# Patient Record
Sex: MALE | Race: WHITE | NOT HISPANIC OR LATINO | Employment: OTHER | URBAN - METROPOLITAN AREA
[De-identification: names, ages, dates, MRNs, and addresses within clinical notes are randomized per-mention and may not be internally consistent; named-entity substitution may affect disease eponyms.]

---

## 2023-04-06 DIAGNOSIS — I10 BENIGN ESSENTIAL HYPERTENSION: Primary | ICD-10-CM

## 2023-04-10 PROBLEM — I10 BENIGN ESSENTIAL HYPERTENSION: Status: ACTIVE | Noted: 2023-04-10

## 2023-04-10 RX ORDER — VERAPAMIL HYDROCHLORIDE 180 MG/1
TABLET, FILM COATED, EXTENDED RELEASE ORAL
Qty: 90 TABLET | Refills: 3 | Status: SHIPPED | OUTPATIENT
Start: 2023-04-10 | End: 2024-05-20 | Stop reason: SDUPTHER

## 2023-05-17 ENCOUNTER — LAB (OUTPATIENT)
Dept: LAB | Facility: LAB | Age: 78
End: 2023-05-17
Payer: MEDICARE

## 2023-05-17 ENCOUNTER — OFFICE VISIT (OUTPATIENT)
Dept: PRIMARY CARE | Facility: CLINIC | Age: 78
End: 2023-05-17
Payer: MEDICARE

## 2023-05-17 VITALS
WEIGHT: 157 LBS | BODY MASS INDEX: 26.16 KG/M2 | TEMPERATURE: 98.2 F | HEIGHT: 65 IN | SYSTOLIC BLOOD PRESSURE: 160 MMHG | DIASTOLIC BLOOD PRESSURE: 78 MMHG | HEART RATE: 92 BPM

## 2023-05-17 DIAGNOSIS — I35.8 AORTIC VALVE SCLEROSIS: ICD-10-CM

## 2023-05-17 DIAGNOSIS — I10 BENIGN ESSENTIAL HYPERTENSION: ICD-10-CM

## 2023-05-17 DIAGNOSIS — E78.00 HYPERCHOLESTEROLEMIA: ICD-10-CM

## 2023-05-17 DIAGNOSIS — G62.89 OTHER POLYNEUROPATHY: ICD-10-CM

## 2023-05-17 DIAGNOSIS — Z00.00 ROUTINE GENERAL MEDICAL EXAMINATION AT HEALTH CARE FACILITY: Primary | ICD-10-CM

## 2023-05-17 PROBLEM — G62.9 PERIPHERAL NEUROPATHY: Status: ACTIVE | Noted: 2023-05-17

## 2023-05-17 PROBLEM — B97.89 VIRAL KERATITIS: Status: ACTIVE | Noted: 2023-05-17

## 2023-05-17 PROBLEM — H16.8 VIRAL KERATITIS: Status: ACTIVE | Noted: 2023-05-17

## 2023-05-17 PROBLEM — Z96.642 STATUS POST LEFT HIP REPLACEMENT: Status: ACTIVE | Noted: 2023-05-17

## 2023-05-17 LAB
BASOPHILS (10*3/UL) IN BLOOD BY AUTOMATED COUNT: 0.09 X10E9/L (ref 0–0.1)
BASOPHILS/100 LEUKOCYTES IN BLOOD BY AUTOMATED COUNT: 1.3 % (ref 0–2)
EOSINOPHILS (10*3/UL) IN BLOOD BY AUTOMATED COUNT: 0.07 X10E9/L (ref 0–0.4)
EOSINOPHILS/100 LEUKOCYTES IN BLOOD BY AUTOMATED COUNT: 1 % (ref 0–6)
ERYTHROCYTE DISTRIBUTION WIDTH (RATIO) BY AUTOMATED COUNT: 12.6 % (ref 11.5–14.5)
ERYTHROCYTE MEAN CORPUSCULAR HEMOGLOBIN CONCENTRATION (G/DL) BY AUTOMATED: 33.8 G/DL (ref 32–36)
ERYTHROCYTE MEAN CORPUSCULAR VOLUME (FL) BY AUTOMATED COUNT: 93 FL (ref 80–100)
ERYTHROCYTES (10*6/UL) IN BLOOD BY AUTOMATED COUNT: 4.23 X10E12/L (ref 4.5–5.9)
HEMATOCRIT (%) IN BLOOD BY AUTOMATED COUNT: 39.3 % (ref 41–52)
HEMOGLOBIN (G/DL) IN BLOOD: 13.3 G/DL (ref 13.5–17.5)
IMMATURE GRANULOCYTES/100 LEUKOCYTES IN BLOOD BY AUTOMATED COUNT: 0.3 % (ref 0–0.9)
LEUKOCYTES (10*3/UL) IN BLOOD BY AUTOMATED COUNT: 7 X10E9/L (ref 4.4–11.3)
LYMPHOCYTES (10*3/UL) IN BLOOD BY AUTOMATED COUNT: 1.73 X10E9/L (ref 0.8–3)
LYMPHOCYTES/100 LEUKOCYTES IN BLOOD BY AUTOMATED COUNT: 24.7 % (ref 13–44)
MONOCYTES (10*3/UL) IN BLOOD BY AUTOMATED COUNT: 0.86 X10E9/L (ref 0.05–0.8)
MONOCYTES/100 LEUKOCYTES IN BLOOD BY AUTOMATED COUNT: 12.3 % (ref 2–10)
NEUTROPHILS (10*3/UL) IN BLOOD BY AUTOMATED COUNT: 4.23 X10E9/L (ref 1.6–5.5)
NEUTROPHILS/100 LEUKOCYTES IN BLOOD BY AUTOMATED COUNT: 60.4 % (ref 40–80)
NRBC (PER 100 WBCS) BY AUTOMATED COUNT: 0 /100 WBC (ref 0–0)
PLATELETS (10*3/UL) IN BLOOD AUTOMATED COUNT: 291 X10E9/L (ref 150–450)

## 2023-05-17 PROCEDURE — 1159F MED LIST DOCD IN RCRD: CPT | Performed by: INTERNAL MEDICINE

## 2023-05-17 PROCEDURE — 36415 COLL VENOUS BLD VENIPUNCTURE: CPT

## 2023-05-17 PROCEDURE — 1170F FXNL STATUS ASSESSED: CPT | Performed by: INTERNAL MEDICINE

## 2023-05-17 PROCEDURE — 3078F DIAST BP <80 MM HG: CPT | Performed by: INTERNAL MEDICINE

## 2023-05-17 PROCEDURE — 85025 COMPLETE CBC W/AUTO DIFF WBC: CPT

## 2023-05-17 PROCEDURE — 80061 LIPID PANEL: CPT

## 2023-05-17 PROCEDURE — 80053 COMPREHEN METABOLIC PANEL: CPT

## 2023-05-17 PROCEDURE — 1160F RVW MEDS BY RX/DR IN RCRD: CPT | Performed by: INTERNAL MEDICINE

## 2023-05-17 PROCEDURE — 3077F SYST BP >= 140 MM HG: CPT | Performed by: INTERNAL MEDICINE

## 2023-05-17 PROCEDURE — G0439 PPPS, SUBSEQ VISIT: HCPCS | Performed by: INTERNAL MEDICINE

## 2023-05-17 RX ORDER — MULTIVIT-MIN/FA/LYCOPEN/LUTEIN .4-300-25
1 TABLET ORAL DAILY
COMMUNITY
Start: 2022-05-16

## 2023-05-17 RX ORDER — ATORVASTATIN CALCIUM 20 MG/1
20 TABLET, FILM COATED ORAL
COMMUNITY
End: 2024-05-20 | Stop reason: SDUPTHER

## 2023-05-17 RX ORDER — LOSARTAN POTASSIUM AND HYDROCHLOROTHIAZIDE 12.5; 1 MG/1; MG/1
TABLET ORAL
COMMUNITY
End: 2023-11-01 | Stop reason: SDUPTHER

## 2023-05-17 RX ORDER — FAMCICLOVIR 500 MG/1
1 TABLET ORAL
COMMUNITY
Start: 2018-05-07 | End: 2024-05-20 | Stop reason: SDUPTHER

## 2023-05-17 ASSESSMENT — ACTIVITIES OF DAILY LIVING (ADL)
GROCERY_SHOPPING: INDEPENDENT
DOING_HOUSEWORK: INDEPENDENT
DRESSING: INDEPENDENT
MANAGING_FINANCES: INDEPENDENT
TAKING_MEDICATION: INDEPENDENT
BATHING: INDEPENDENT

## 2023-05-17 ASSESSMENT — PATIENT HEALTH QUESTIONNAIRE - PHQ9
1. LITTLE INTEREST OR PLEASURE IN DOING THINGS: NOT AT ALL
2. FEELING DOWN, DEPRESSED OR HOPELESS: NOT AT ALL
SUM OF ALL RESPONSES TO PHQ9 QUESTIONS 1 AND 2: 0

## 2023-05-17 ASSESSMENT — ENCOUNTER SYMPTOMS
LOSS OF SENSATION IN FEET: 1
DEPRESSION: 0
OCCASIONAL FEELINGS OF UNSTEADINESS: 1

## 2023-05-17 NOTE — ASSESSMENT & PLAN NOTE
Controlled at home. High here today. Continue to monitor at home and advise if persistently 135/85 or greater.

## 2023-05-17 NOTE — PROGRESS NOTES
"Has living will and DPOAHC.    Subjective   Reason for Visit: Raúl Garcia is an 78 y.o. male here for a Medicare Wellness visit.          Reviewed all medications by prescribing practitioner or clinical pharmacist (such as prescriptions, OTCs, herbal therapies and supplements) and documented in the medical record.    HPI    Patient Care Team:  Neri Irizarry MD as PCP - General  Neri Irizarry MD as PCP - [employer]vantage PCP  Hector Michael MD as Referring Physician (General Surgery)  Eliseo Campbell MD (Dermatology)  Ro Holley as Consulting Physician (Podiatry)     Review of Systems  General: Denies weakness, fever, anorexia. Denies significant change in weight.  HEENT: Denies HA, vision change but does have vision difficulty, hearing loss or tinnitus, voice or swallowing problems.  Resp: Denies SOB, cough, or wheezing.  CV: Denies chest pain or pressure, palpitations, syncope, edema, or claudication. Checks BP weekly, 130s/70s  GI : Denies abdominal pain, diarrhea, constipation, heartburn, rectal bleeding, or change in bowel habits.  : Denies urinary frequency, pain, blood, incontinence, or nocturia.  Sexual: No sexual health or reproductive system concerns.  MSK: Denies significant musculoskeletal pains or limitations EXCEPT AS FOLLOWS...none  Neuro: Denies tingling, numbness, weakness, tremor,  falls, or memory loss. BALANCE IS POOR D/T VISION AND NEUROPATHY  Psych: Denies Mood or sleep issues.  Derm: No unusual lesions, moles or rashes.    Objective   Vitals:  BP (!) 186/73   Pulse 92   Temp 36.8 °C (98.2 °F) (Oral)   Ht 1.651 m (5' 5\")   Wt 71.2 kg (157 lb)   BMI 26.13 kg/m²       Physical Exam  Constitutional: Alert and in no acute distress  Inspection of Eyes: Sclera and conjunctivae normal.  Pupil exam: PERRL. EOMI.  Tympanic membranes are normal. External ears appear normal.   Oropharynx: Normal with MMM.   Neck: Thyroid normal. No cervical lymphadenopathy. No carotid " bruit.  No cervical, axillary, or inguinal lymphadenopathy.  Breasts: No masses.   Lungs are clear to auscultation and percussion.  Cardiac: S1 and S2 are normal. No rubs, or gallops. 3/6 SYSTOLIC M AT URSB, harsh No edema.   Abdomen: Soft, nontender. Normal bowel sounds. No hepatosplenomegaly or masses.  Musculoskeletal: Examination of gait is normal. No clubbing or cyanosis. Full range of motion of joints. NO swelling, tenderness, or limitation of motion.  Skin normal skin color and pigmentation. No visible rash. Nml skin turgor.  Neurological: Cranial nerves II-XII intact. Deep tendon reflexes 2+ and symmetric .No focal motor weakness. Sensation and vibration are abnormal from midcalf down. JPS is intact. No pronator drift. Coordination normal. Balance normal.  Psychiatric: A&Ox3. Mood and affect normal.    Assessment/Plan   Problem List Items Addressed This Visit    None  Visit Diagnoses       Routine general medical examination at health care facility    -  Primary

## 2023-05-18 LAB
ALANINE AMINOTRANSFERASE (SGPT) (U/L) IN SER/PLAS: 12 U/L (ref 10–52)
ALBUMIN (G/DL) IN SER/PLAS: 4.2 G/DL (ref 3.4–5)
ALKALINE PHOSPHATASE (U/L) IN SER/PLAS: 69 U/L (ref 33–136)
ANION GAP IN SER/PLAS: 16 MMOL/L (ref 10–20)
ASPARTATE AMINOTRANSFERASE (SGOT) (U/L) IN SER/PLAS: 21 U/L (ref 9–39)
BILIRUBIN TOTAL (MG/DL) IN SER/PLAS: 0.7 MG/DL (ref 0–1.2)
CALCIUM (MG/DL) IN SER/PLAS: 9.5 MG/DL (ref 8.6–10.6)
CARBON DIOXIDE, TOTAL (MMOL/L) IN SER/PLAS: 25 MMOL/L (ref 21–32)
CHLORIDE (MMOL/L) IN SER/PLAS: 98 MMOL/L (ref 98–107)
CHOLESTEROL (MG/DL) IN SER/PLAS: 180 MG/DL (ref 0–199)
CHOLESTEROL IN HDL (MG/DL) IN SER/PLAS: 81.8 MG/DL
CHOLESTEROL/HDL RATIO: 2.2
CREATININE (MG/DL) IN SER/PLAS: 1.53 MG/DL (ref 0.5–1.3)
GFR MALE: 46 ML/MIN/1.73M2
GLUCOSE (MG/DL) IN SER/PLAS: 95 MG/DL (ref 74–99)
LDL: 86 MG/DL (ref 0–99)
POTASSIUM (MMOL/L) IN SER/PLAS: 4.6 MMOL/L (ref 3.5–5.3)
PROTEIN TOTAL: 7.3 G/DL (ref 6.4–8.2)
SODIUM (MMOL/L) IN SER/PLAS: 134 MMOL/L (ref 136–145)
TRIGLYCERIDE (MG/DL) IN SER/PLAS: 62 MG/DL (ref 0–149)
UREA NITROGEN (MG/DL) IN SER/PLAS: 21 MG/DL (ref 6–23)
VLDL: 12 MG/DL (ref 0–40)

## 2023-10-25 ENCOUNTER — HOSPITAL ENCOUNTER (OUTPATIENT)
Dept: RADIOLOGY | Facility: EXTERNAL LOCATION | Age: 78
Discharge: HOME | End: 2023-10-25
Payer: MEDICARE

## 2023-10-25 DIAGNOSIS — R22.31: ICD-10-CM

## 2023-11-01 DIAGNOSIS — I10 PRIMARY HYPERTENSION: Primary | ICD-10-CM

## 2023-11-13 RX ORDER — LOSARTAN POTASSIUM AND HYDROCHLOROTHIAZIDE 12.5; 1 MG/1; MG/1
1 TABLET ORAL DAILY
Qty: 30 TABLET | Refills: 0 | Status: SHIPPED | OUTPATIENT
Start: 2023-11-13 | End: 2023-11-15 | Stop reason: SDUPTHER

## 2023-11-15 DIAGNOSIS — I10 PRIMARY HYPERTENSION: ICD-10-CM

## 2023-11-15 RX ORDER — LOSARTAN POTASSIUM AND HYDROCHLOROTHIAZIDE 12.5; 1 MG/1; MG/1
1 TABLET ORAL DAILY
Qty: 30 TABLET | Refills: 0 | Status: CANCELLED | OUTPATIENT
Start: 2023-11-15

## 2023-11-15 RX ORDER — LOSARTAN POTASSIUM AND HYDROCHLOROTHIAZIDE 12.5; 1 MG/1; MG/1
1 TABLET ORAL DAILY
Qty: 90 TABLET | Refills: 3 | Status: SHIPPED | OUTPATIENT
Start: 2023-11-15 | End: 2023-12-15

## 2023-12-15 DIAGNOSIS — I10 PRIMARY HYPERTENSION: ICD-10-CM

## 2023-12-15 RX ORDER — LOSARTAN POTASSIUM AND HYDROCHLOROTHIAZIDE 12.5; 1 MG/1; MG/1
1 TABLET ORAL DAILY
Qty: 90 TABLET | Refills: 3 | Status: SHIPPED | OUTPATIENT
Start: 2023-12-15 | End: 2024-05-20 | Stop reason: HOSPADM

## 2024-02-08 ENCOUNTER — OFFICE VISIT (OUTPATIENT)
Dept: OTOLARYNGOLOGY | Facility: CLINIC | Age: 79
End: 2024-02-08
Payer: MEDICARE

## 2024-02-08 VITALS — BODY MASS INDEX: 23.72 KG/M2 | TEMPERATURE: 97.6 F | WEIGHT: 147.6 LBS | HEIGHT: 66 IN

## 2024-02-08 DIAGNOSIS — H61.23 BILATERAL IMPACTED CERUMEN: Primary | ICD-10-CM

## 2024-02-08 PROCEDURE — 69210 REMOVE IMPACTED EAR WAX UNI: CPT | Performed by: NURSE PRACTITIONER

## 2024-02-08 PROCEDURE — 1123F ACP DISCUSS/DSCN MKR DOCD: CPT | Performed by: NURSE PRACTITIONER

## 2024-02-08 PROCEDURE — 1036F TOBACCO NON-USER: CPT | Performed by: NURSE PRACTITIONER

## 2024-02-08 PROCEDURE — 1159F MED LIST DOCD IN RCRD: CPT | Performed by: NURSE PRACTITIONER

## 2024-02-08 PROCEDURE — 1160F RVW MEDS BY RX/DR IN RCRD: CPT | Performed by: NURSE PRACTITIONER

## 2024-02-08 RX ORDER — LOSARTAN POTASSIUM 100 MG/1
100 TABLET ORAL DAILY
COMMUNITY
Start: 2023-05-10 | End: 2024-05-08 | Stop reason: HOSPADM

## 2024-02-08 ASSESSMENT — PATIENT HEALTH QUESTIONNAIRE - PHQ9
1. LITTLE INTEREST OR PLEASURE IN DOING THINGS: NOT AT ALL
SUM OF ALL RESPONSES TO PHQ9 QUESTIONS 1 AND 2: 0
2. FEELING DOWN, DEPRESSED OR HOPELESS: NOT AT ALL
SUM OF ALL RESPONSES TO PHQ9 QUESTIONS 1 AND 2: 0
2. FEELING DOWN, DEPRESSED OR HOPELESS: NOT AT ALL
1. LITTLE INTEREST OR PLEASURE IN DOING THINGS: NOT AT ALL

## 2024-02-08 NOTE — PROGRESS NOTES
Subjective   Patient ID: Raúl Garcia is a 79 y.o. male who presents for Cerumen Impaction.  HPI  Patient is here for routine ear cleaning.  NO ear infection in the interim.  No ear pain or drainage. The hearing has been stable.  Review of Systems      All other systems have been reviewed and are negative for complaints except for those mentioned in history of present illness, past medical history and problem list       Objective   Physical Exam    CONSTITUTIONAL: No acute distress, normal facial features; No fever; no chills  VOICE: No hoarseness or other audible abnormality  RESPIRATION: Breathing comfortably, no stridor; normal breathing effort  CV: No cyanosis visible on the face and neck area  EYES:Pupils equal and round ; no erythema; conjunctiva clear; sclera white  NEURO: Alert and oriented, able to raise eyebrows symmetrical bilateral, smile with no facial droop, able to swallow  HEAD AND FACE: Symmetric facial features, no masses or lesions    Right ear examination: External ear normal. EAC with impacted cerumen. TM not  visualized.  Left ear examination: External ear normal. EAC with impacted cerumen. TM not  visualized.    NOSE: External nose midline  ORAL CAVITY: No lesions of external lips; uvula is midline; tongue with good mobility; no gross mass in oral cavity; mucosa appears pink   NECK/LYMPH: No obvious deformity or lesions; trachea is midline  PSYCH: Alert and oriented with appropriate mood and affect.    Patient ID: Raúl Garcia is a 79 y.o. male.    Procedures    Cerumen removal    Consent:  The planned procedure is discussed including possible risk, benefits and alternative treatments reviewed.  Verbal consent is obtained.    Indications:Obstructed cerumen is noted affecting hearing and causing discomfort.    Procedure: The ears are examined microscopically.  Using speculum, alligator, #7, #5 suction the obstructive cerumen in both the ears were removed.    Findings: Cerumen and epithelial  debris obstruction in both external auditory canals.  Inspection of tympanic membrane after cleaning showed intact with no effusion, retraction or perforation.    Post procedure: The patient tolerated the procedure well without complications       Assessment/Plan       Problem List Items Addressed This Visit    None  Visit Diagnoses       Bilateral impacted cerumen    -  Primary         This patient presents for evaluation of cerumen impaction. The ear/s cleaned using microscope and instruments.  Patient tolerated the procedure well. Inspection of TM after cleaning showed intact and WNL.  Patient was provided instructions on ear care for cerumen in the discussion summary. Patient may follow up as needed for repeat cleaning or for all other ENT concerns.  All questions were answered to patient's satisfaction.            EPI Sharma-EDUARD 02/08/24 1:56 PM

## 2024-05-07 ENCOUNTER — APPOINTMENT (OUTPATIENT)
Dept: RADIOLOGY | Facility: HOSPITAL | Age: 79
DRG: 536 | End: 2024-05-07
Payer: MEDICARE

## 2024-05-07 ENCOUNTER — APPOINTMENT (OUTPATIENT)
Dept: CARDIOLOGY | Facility: HOSPITAL | Age: 79
DRG: 536 | End: 2024-05-07
Payer: MEDICARE

## 2024-05-07 ENCOUNTER — HOSPITAL ENCOUNTER (INPATIENT)
Facility: HOSPITAL | Age: 79
LOS: 1 days | Discharge: HOME | DRG: 536 | End: 2024-05-08
Attending: STUDENT IN AN ORGANIZED HEALTH CARE EDUCATION/TRAINING PROGRAM | Admitting: HOSPITALIST
Payer: MEDICARE

## 2024-05-07 ENCOUNTER — HOSPITAL ENCOUNTER (OUTPATIENT)
Dept: RADIOLOGY | Facility: EXTERNAL LOCATION | Age: 79
Discharge: HOME | End: 2024-05-07

## 2024-05-07 DIAGNOSIS — M25.552 LEFT HIP PAIN: ICD-10-CM

## 2024-05-07 DIAGNOSIS — S72.002A HIP FRACTURE REQUIRING OPERATIVE REPAIR, LEFT, CLOSED, INITIAL ENCOUNTER (MULTI): Primary | ICD-10-CM

## 2024-05-07 LAB
ABO GROUP (TYPE) IN BLOOD: NORMAL
ANION GAP SERPL CALC-SCNC: 13 MMOL/L (ref 10–20)
ANTIBODY SCREEN: NORMAL
APTT PPP: 29 SECONDS (ref 27–38)
BASOPHILS # BLD AUTO: 0.05 X10*3/UL (ref 0–0.1)
BASOPHILS NFR BLD AUTO: 0.4 %
BNP SERPL-MCNC: 222 PG/ML (ref 0–99)
BUN SERPL-MCNC: 26 MG/DL (ref 6–23)
CALCIUM SERPL-MCNC: 8.8 MG/DL (ref 8.6–10.3)
CARDIAC TROPONIN I PNL SERPL HS: 80 NG/L (ref 0–20)
CARDIAC TROPONIN I PNL SERPL HS: 97 NG/L (ref 0–20)
CHLORIDE SERPL-SCNC: 93 MMOL/L (ref 98–107)
CO2 SERPL-SCNC: 25 MMOL/L (ref 21–32)
CREAT SERPL-MCNC: 1.77 MG/DL (ref 0.5–1.3)
EGFRCR SERPLBLD CKD-EPI 2021: 39 ML/MIN/1.73M*2
EOSINOPHIL # BLD AUTO: 0.01 X10*3/UL (ref 0–0.4)
EOSINOPHIL NFR BLD AUTO: 0.1 %
ERYTHROCYTE [DISTWIDTH] IN BLOOD BY AUTOMATED COUNT: 12.3 % (ref 11.5–14.5)
GLUCOSE SERPL-MCNC: 111 MG/DL (ref 74–99)
HCT VFR BLD AUTO: 32.4 % (ref 41–52)
HGB BLD-MCNC: 11.5 G/DL (ref 13.5–17.5)
IMM GRANULOCYTES # BLD AUTO: 0.08 X10*3/UL (ref 0–0.5)
IMM GRANULOCYTES NFR BLD AUTO: 0.7 % (ref 0–0.9)
INR PPP: 1 (ref 0.9–1.1)
LYMPHOCYTES # BLD AUTO: 0.79 X10*3/UL (ref 0.8–3)
LYMPHOCYTES NFR BLD AUTO: 6.5 %
MAGNESIUM SERPL-MCNC: 1.6 MG/DL (ref 1.6–2.4)
MCH RBC QN AUTO: 32.5 PG (ref 26–34)
MCHC RBC AUTO-ENTMCNC: 35.5 G/DL (ref 32–36)
MCV RBC AUTO: 92 FL (ref 80–100)
MONOCYTES # BLD AUTO: 1.14 X10*3/UL (ref 0.05–0.8)
MONOCYTES NFR BLD AUTO: 9.3 %
NEUTROPHILS # BLD AUTO: 10.14 X10*3/UL (ref 1.6–5.5)
NEUTROPHILS NFR BLD AUTO: 83 %
NRBC BLD-RTO: 0 /100 WBCS (ref 0–0)
PHOSPHATE SERPL-MCNC: 2.8 MG/DL (ref 2.5–4.9)
PLATELET # BLD AUTO: 243 X10*3/UL (ref 150–450)
POTASSIUM SERPL-SCNC: 3.2 MMOL/L (ref 3.5–5.3)
PROTHROMBIN TIME: 10.9 SECONDS (ref 9.8–12.8)
RBC # BLD AUTO: 3.54 X10*6/UL (ref 4.5–5.9)
RH FACTOR (ANTIGEN D): NORMAL
SODIUM SERPL-SCNC: 128 MMOL/L (ref 136–145)
WBC # BLD AUTO: 12.2 X10*3/UL (ref 4.4–11.3)

## 2024-05-07 PROCEDURE — 73700 CT LOWER EXTREMITY W/O DYE: CPT | Mod: LEFT SIDE | Performed by: RADIOLOGY

## 2024-05-07 PROCEDURE — 99285 EMERGENCY DEPT VISIT HI MDM: CPT | Mod: 25

## 2024-05-07 PROCEDURE — 1200000002 HC GENERAL ROOM WITH TELEMETRY DAILY

## 2024-05-07 PROCEDURE — 93005 ELECTROCARDIOGRAM TRACING: CPT

## 2024-05-07 PROCEDURE — 83880 ASSAY OF NATRIURETIC PEPTIDE: CPT | Performed by: STUDENT IN AN ORGANIZED HEALTH CARE EDUCATION/TRAINING PROGRAM

## 2024-05-07 PROCEDURE — 96360 HYDRATION IV INFUSION INIT: CPT

## 2024-05-07 PROCEDURE — 72192 CT PELVIS W/O DYE: CPT | Mod: LEFT SIDE | Performed by: RADIOLOGY

## 2024-05-07 PROCEDURE — 82374 ASSAY BLOOD CARBON DIOXIDE: CPT | Performed by: STUDENT IN AN ORGANIZED HEALTH CARE EDUCATION/TRAINING PROGRAM

## 2024-05-07 PROCEDURE — 36415 COLL VENOUS BLD VENIPUNCTURE: CPT | Performed by: EMERGENCY MEDICINE

## 2024-05-07 PROCEDURE — 85025 COMPLETE CBC W/AUTO DIFF WBC: CPT | Performed by: STUDENT IN AN ORGANIZED HEALTH CARE EDUCATION/TRAINING PROGRAM

## 2024-05-07 PROCEDURE — 85730 THROMBOPLASTIN TIME PARTIAL: CPT | Mod: AHULAB | Performed by: STUDENT IN AN ORGANIZED HEALTH CARE EDUCATION/TRAINING PROGRAM

## 2024-05-07 PROCEDURE — 36415 COLL VENOUS BLD VENIPUNCTURE: CPT | Performed by: STUDENT IN AN ORGANIZED HEALTH CARE EDUCATION/TRAINING PROGRAM

## 2024-05-07 PROCEDURE — 2500000004 HC RX 250 GENERAL PHARMACY W/ HCPCS (ALT 636 FOR OP/ED): Performed by: STUDENT IN AN ORGANIZED HEALTH CARE EDUCATION/TRAINING PROGRAM

## 2024-05-07 PROCEDURE — 84484 ASSAY OF TROPONIN QUANT: CPT | Performed by: STUDENT IN AN ORGANIZED HEALTH CARE EDUCATION/TRAINING PROGRAM

## 2024-05-07 PROCEDURE — 72192 CT PELVIS W/O DYE: CPT

## 2024-05-07 PROCEDURE — 85610 PROTHROMBIN TIME: CPT | Mod: AHULAB | Performed by: STUDENT IN AN ORGANIZED HEALTH CARE EDUCATION/TRAINING PROGRAM

## 2024-05-07 PROCEDURE — 86900 BLOOD TYPING SEROLOGIC ABO: CPT | Mod: 91 | Performed by: STUDENT IN AN ORGANIZED HEALTH CARE EDUCATION/TRAINING PROGRAM

## 2024-05-07 PROCEDURE — 73700 CT LOWER EXTREMITY W/O DYE: CPT | Mod: LT

## 2024-05-07 PROCEDURE — 84100 ASSAY OF PHOSPHORUS: CPT | Performed by: STUDENT IN AN ORGANIZED HEALTH CARE EDUCATION/TRAINING PROGRAM

## 2024-05-07 PROCEDURE — 99223 1ST HOSP IP/OBS HIGH 75: CPT | Performed by: NURSE PRACTITIONER

## 2024-05-07 PROCEDURE — 84484 ASSAY OF TROPONIN QUANT: CPT | Mod: 91 | Performed by: EMERGENCY MEDICINE

## 2024-05-07 PROCEDURE — 73552 X-RAY EXAM OF FEMUR 2/>: CPT | Mod: LEFT SIDE | Performed by: RADIOLOGY

## 2024-05-07 PROCEDURE — 73552 X-RAY EXAM OF FEMUR 2/>: CPT | Mod: LT

## 2024-05-07 PROCEDURE — 83735 ASSAY OF MAGNESIUM: CPT | Performed by: STUDENT IN AN ORGANIZED HEALTH CARE EDUCATION/TRAINING PROGRAM

## 2024-05-07 RX ORDER — DOCUSATE SODIUM 100 MG/1
100 CAPSULE, LIQUID FILLED ORAL 2 TIMES DAILY
Status: DISCONTINUED | OUTPATIENT
Start: 2024-05-08 | End: 2024-05-08 | Stop reason: HOSPADM

## 2024-05-07 RX ORDER — HEPARIN SODIUM 5000 [USP'U]/ML
5000 INJECTION, SOLUTION INTRAVENOUS; SUBCUTANEOUS EVERY 8 HOURS
Status: DISCONTINUED | OUTPATIENT
Start: 2024-05-08 | End: 2024-05-08 | Stop reason: HOSPADM

## 2024-05-07 RX ORDER — OXYCODONE HYDROCHLORIDE 5 MG/1
5 TABLET ORAL EVERY 4 HOURS PRN
Status: DISCONTINUED | OUTPATIENT
Start: 2024-05-07 | End: 2024-05-08 | Stop reason: HOSPADM

## 2024-05-07 RX ORDER — POTASSIUM CHLORIDE 20 MEQ/1
20 TABLET, EXTENDED RELEASE ORAL ONCE
Status: DISCONTINUED | OUTPATIENT
Start: 2024-05-07 | End: 2024-05-08 | Stop reason: HOSPADM

## 2024-05-07 RX ORDER — ATORVASTATIN CALCIUM 20 MG/1
20 TABLET, FILM COATED ORAL
Status: DISCONTINUED | OUTPATIENT
Start: 2024-05-08 | End: 2024-05-08 | Stop reason: HOSPADM

## 2024-05-07 RX ORDER — ACETAMINOPHEN 325 MG/1
650 TABLET ORAL EVERY 4 HOURS PRN
Status: DISCONTINUED | OUTPATIENT
Start: 2024-05-07 | End: 2024-05-08 | Stop reason: HOSPADM

## 2024-05-07 RX ORDER — MORPHINE SULFATE 4 MG/ML
4 INJECTION, SOLUTION INTRAMUSCULAR; INTRAVENOUS ONCE
Status: DISCONTINUED | OUTPATIENT
Start: 2024-05-07 | End: 2024-05-07

## 2024-05-07 RX ORDER — VERAPAMIL HYDROCHLORIDE 120 MG/1
180 TABLET, FILM COATED, EXTENDED RELEASE ORAL DAILY
Status: DISCONTINUED | OUTPATIENT
Start: 2024-05-08 | End: 2024-05-08 | Stop reason: HOSPADM

## 2024-05-07 RX ORDER — FAMCICLOVIR 500 MG/1
500 TABLET ORAL EVERY 24 HOURS
Status: DISCONTINUED | OUTPATIENT
Start: 2024-05-08 | End: 2024-05-08 | Stop reason: HOSPADM

## 2024-05-07 RX ADMIN — SODIUM CHLORIDE, POTASSIUM CHLORIDE, SODIUM LACTATE AND CALCIUM CHLORIDE 1000 ML: 600; 310; 30; 20 INJECTION, SOLUTION INTRAVENOUS at 19:00

## 2024-05-07 ASSESSMENT — ACTIVITIES OF DAILY LIVING (ADL)
HEARING - LEFT EAR: FUNCTIONAL
PATIENT'S MEMORY ADEQUATE TO SAFELY COMPLETE DAILY ACTIVITIES?: YES
JUDGMENT_ADEQUATE_SAFELY_COMPLETE_DAILY_ACTIVITIES: YES
LACK_OF_TRANSPORTATION: NO
GROOMING: INDEPENDENT
TOILETING: INDEPENDENT
WALKS IN HOME: INDEPENDENT
ADEQUATE_TO_COMPLETE_ADL: YES
HEARING - RIGHT EAR: FUNCTIONAL
DRESSING YOURSELF: INDEPENDENT
BATHING: INDEPENDENT
FEEDING YOURSELF: INDEPENDENT

## 2024-05-07 ASSESSMENT — PAIN SCALES - GENERAL
PAINLEVEL_OUTOF10: 0 - NO PAIN
PAINLEVEL_OUTOF10: 0 - NO PAIN

## 2024-05-07 ASSESSMENT — COGNITIVE AND FUNCTIONAL STATUS - GENERAL
MOVING TO AND FROM BED TO CHAIR: A LITTLE
DRESSING REGULAR LOWER BODY CLOTHING: A LITTLE
PATIENT BASELINE BEDBOUND: NO
DAILY ACTIVITIY SCORE: 23
STANDING UP FROM CHAIR USING ARMS: A LITTLE
CLIMB 3 TO 5 STEPS WITH RAILING: A LITTLE
WALKING IN HOSPITAL ROOM: A LITTLE
MOBILITY SCORE: 20

## 2024-05-07 ASSESSMENT — ENCOUNTER SYMPTOMS
CONSTITUTIONAL NEGATIVE: 1
PSYCHIATRIC NEGATIVE: 1
GASTROINTESTINAL NEGATIVE: 1
MYALGIAS: 1
RESPIRATORY NEGATIVE: 1
EYES NEGATIVE: 1
ARTHRALGIAS: 1
CARDIOVASCULAR NEGATIVE: 1

## 2024-05-07 ASSESSMENT — COLUMBIA-SUICIDE SEVERITY RATING SCALE - C-SSRS
2. HAVE YOU ACTUALLY HAD ANY THOUGHTS OF KILLING YOURSELF?: NO
1. IN THE PAST MONTH, HAVE YOU WISHED YOU WERE DEAD OR WISHED YOU COULD GO TO SLEEP AND NOT WAKE UP?: NO
6. HAVE YOU EVER DONE ANYTHING, STARTED TO DO ANYTHING, OR PREPARED TO DO ANYTHING TO END YOUR LIFE?: NO

## 2024-05-07 ASSESSMENT — PAIN - FUNCTIONAL ASSESSMENT
PAIN_FUNCTIONAL_ASSESSMENT: 0-10
PAIN_FUNCTIONAL_ASSESSMENT: 0-10

## 2024-05-07 NOTE — ED PROVIDER NOTES
HPI:    History provided by: Patient      79-year-old male with past medical history of aortic valve sclerosis, polyneuropathy, hypercholesterolemia, bilateral hip replacement presenting for a mechanical fall.  Patient states he was walking down the steps when he fell onto his left hip.  Did not strike himself elsewhere.  He has been having pain in the left hip that is sharp in nature, 6 out of 10 on the pain scale, nonradiating.  He has had troubles walking since then.  He was seen at urgent care and diagnosed with periprosthetic femur fracture, sent to the emergency department.  Patient and family state that while at urgent care he had an episode where he became warm, flushed, and passed out very briefly.  He had no chest pain, shortness of breath associated with this, and did not hit his head.  Patient states he otherwise feels well, and did not pass out prior to the fall.          ROS: All pertinent positives and negatives reviewed in HPI    PMHx: Reviewed  PSHx: Reviewed  Social: no Etoh, no tobacco, no social drugs  Social Determinants of Health impacting care: NA  Allergies: Reviewed in EMR  FMHx: Noncontributory to patient's chief complaint  Medications: Reviewed        Vital signs and triage note reviewed per nursing documentation    Physical Exam:     GEN: Sitting in no acute distress. Well-appearing, appears stated age  HEAD: Atraumatic, normocephalic  EYES: EOMI. Pupils equal and reactive.   HEENT: MMM. No oropharyngeal erythema, exudates, or uvular deviation.   Neck: Supple, full ROM  CVS: Regular rate and rhythm, no murmurs, gallops, or rubs  PULM: Clear to auscultation bilaterally. No wheezes, rales, rhonchi.   ABD: Soft, nontender to palpation. No rigidity, guarding, or tympany  EXT: ttp over left hip with no open wounds. +2 DP and pt pulses  NEURO: Alert, keenly responsive. Moving all 4 extremities spontaneously with normal strength. Normal sensation in all 4 extremities     Emergency Department  Course    Medications Ordered: IV morphine, IV LR    EKG: Regular ventricular rate, regular rhythm, normal axis. UT, QRS, and QTC intervals within normal limits. No acute ST segment changes or T wave inversions.     Impression: Normal sinus rhythm with no acute ischemia or arrhythmia, no previous EKG seen for comparison.       MDM        79-year-old male who presents to the emergency department for a mechanical fall leading to a periprosthetic hip fracture, along with a syncopal event.  My assessment is hemodynamically stable, neurovascularly intact male in no acute distress.  Given that he had a syncopal event in urgent care, we will obtain syncope workup, and repeat x-rays of the leg.  No other injuries noted given no head strike so no indication for head CT or C-spine CT at this time.  Patient's workup demonstrates mild hypokalemia, and elevated troponin level of 90 with no ischemic signs on EKG that down trended on repeat.  Less concern for acute coronary syndrome given the patient has no active chest pain, and his troponin is downtrending.  I consulted orthopedic surgery who recommended obtaining CT scan, make the patient n.p.o. at midnight, and they will assess to discern if patient needs operative intervention or just PT/OT.  Will be admitted to medicine in stable condition under telemetry.      Consultations:    NA    Medications Prescribed:    NA    Disposition:    Admitted         Adelso Jones MD  05/08/24 1210

## 2024-05-08 VITALS
WEIGHT: 143.3 LBS | SYSTOLIC BLOOD PRESSURE: 121 MMHG | OXYGEN SATURATION: 100 % | HEIGHT: 66 IN | TEMPERATURE: 98.9 F | BODY MASS INDEX: 23.03 KG/M2 | DIASTOLIC BLOOD PRESSURE: 61 MMHG | HEART RATE: 66 BPM | RESPIRATION RATE: 16 BRPM

## 2024-05-08 LAB
ANION GAP SERPL CALC-SCNC: 16 MMOL/L (ref 10–20)
ATRIAL RATE: 87 BPM
BUN SERPL-MCNC: 23 MG/DL (ref 6–23)
CALCIUM SERPL-MCNC: 8.5 MG/DL (ref 8.6–10.3)
CHLORIDE SERPL-SCNC: 96 MMOL/L (ref 98–107)
CO2 SERPL-SCNC: 20 MMOL/L (ref 21–32)
CREAT SERPL-MCNC: 1.34 MG/DL (ref 0.5–1.3)
EGFRCR SERPLBLD CKD-EPI 2021: 54 ML/MIN/1.73M*2
ERYTHROCYTE [DISTWIDTH] IN BLOOD BY AUTOMATED COUNT: 12.1 % (ref 11.5–14.5)
GLUCOSE SERPL-MCNC: 126 MG/DL (ref 74–99)
HCT VFR BLD AUTO: 29.1 % (ref 41–52)
HGB BLD-MCNC: 10.3 G/DL (ref 13.5–17.5)
MAGNESIUM SERPL-MCNC: 1.5 MG/DL (ref 1.6–2.4)
MCH RBC QN AUTO: 31.9 PG (ref 26–34)
MCHC RBC AUTO-ENTMCNC: 35.4 G/DL (ref 32–36)
MCV RBC AUTO: 90 FL (ref 80–100)
NRBC BLD-RTO: 0 /100 WBCS (ref 0–0)
P AXIS: 81 DEGREES
P OFFSET: 175 MS
P ONSET: 124 MS
PLATELET # BLD AUTO: 239 X10*3/UL (ref 150–450)
POTASSIUM SERPL-SCNC: 2.9 MMOL/L (ref 3.5–5.3)
PR INTERVAL: 208 MS
Q ONSET: 228 MS
QRS COUNT: 14 BEATS
QRS DURATION: 90 MS
QT INTERVAL: 378 MS
QTC CALCULATION(BAZETT): 454 MS
QTC FREDERICIA: 427 MS
R AXIS: 49 DEGREES
RBC # BLD AUTO: 3.23 X10*6/UL (ref 4.5–5.9)
SODIUM SERPL-SCNC: 129 MMOL/L (ref 136–145)
T AXIS: 51 DEGREES
T OFFSET: 417 MS
VENTRICULAR RATE: 87 BPM
WBC # BLD AUTO: 9.1 X10*3/UL (ref 4.4–11.3)

## 2024-05-08 PROCEDURE — 97530 THERAPEUTIC ACTIVITIES: CPT | Mod: GP

## 2024-05-08 PROCEDURE — 36415 COLL VENOUS BLD VENIPUNCTURE: CPT | Performed by: NURSE PRACTITIONER

## 2024-05-08 PROCEDURE — 97161 PT EVAL LOW COMPLEX 20 MIN: CPT | Mod: GP

## 2024-05-08 PROCEDURE — 99239 HOSP IP/OBS DSCHRG MGMT >30: CPT | Performed by: INTERNAL MEDICINE

## 2024-05-08 PROCEDURE — 2500000006 HC RX 250 W HCPCS SELF ADMINISTERED DRUGS (ALT 637 FOR ALL PAYERS): Performed by: NURSE PRACTITIONER

## 2024-05-08 PROCEDURE — 2500000001 HC RX 250 WO HCPCS SELF ADMINISTERED DRUGS (ALT 637 FOR MEDICARE OP): Performed by: INTERNAL MEDICINE

## 2024-05-08 PROCEDURE — 2500000004 HC RX 250 GENERAL PHARMACY W/ HCPCS (ALT 636 FOR OP/ED): Performed by: NURSE PRACTITIONER

## 2024-05-08 PROCEDURE — 85027 COMPLETE CBC AUTOMATED: CPT | Performed by: NURSE PRACTITIONER

## 2024-05-08 PROCEDURE — 80048 BASIC METABOLIC PNL TOTAL CA: CPT | Performed by: NURSE PRACTITIONER

## 2024-05-08 PROCEDURE — 83735 ASSAY OF MAGNESIUM: CPT | Performed by: NURSE PRACTITIONER

## 2024-05-08 PROCEDURE — 97165 OT EVAL LOW COMPLEX 30 MIN: CPT | Mod: GO

## 2024-05-08 PROCEDURE — 97535 SELF CARE MNGMENT TRAINING: CPT | Mod: GO

## 2024-05-08 PROCEDURE — 2500000004 HC RX 250 GENERAL PHARMACY W/ HCPCS (ALT 636 FOR OP/ED): Performed by: INTERNAL MEDICINE

## 2024-05-08 PROCEDURE — 2500000006 HC RX 250 W HCPCS SELF ADMINISTERED DRUGS (ALT 637 FOR ALL PAYERS): Performed by: INTERNAL MEDICINE

## 2024-05-08 PROCEDURE — 2500000004 HC RX 250 GENERAL PHARMACY W/ HCPCS (ALT 636 FOR OP/ED): Performed by: HOSPITALIST

## 2024-05-08 RX ORDER — POTASSIUM CHLORIDE 20 MEQ/1
40 TABLET, EXTENDED RELEASE ORAL ONCE
Status: COMPLETED | OUTPATIENT
Start: 2024-05-08 | End: 2024-05-08

## 2024-05-08 RX ORDER — LANOLIN ALCOHOL/MO/W.PET/CERES
400 CREAM (GRAM) TOPICAL ONCE
Status: COMPLETED | OUTPATIENT
Start: 2024-05-08 | End: 2024-05-08

## 2024-05-08 RX ORDER — DOCUSATE SODIUM 100 MG/1
100 CAPSULE, LIQUID FILLED ORAL 2 TIMES DAILY
Qty: 60 CAPSULE | Refills: 1 | Status: SHIPPED | OUTPATIENT
Start: 2024-05-08 | End: 2024-05-20 | Stop reason: ALTCHOICE

## 2024-05-08 RX ORDER — LOSARTAN POTASSIUM 50 MG/1
100 TABLET ORAL DAILY
Status: DISCONTINUED | OUTPATIENT
Start: 2024-05-08 | End: 2024-05-08 | Stop reason: HOSPADM

## 2024-05-08 RX ORDER — HYDRALAZINE HYDROCHLORIDE 20 MG/ML
10 INJECTION INTRAMUSCULAR; INTRAVENOUS EVERY 6 HOURS PRN
Status: DISCONTINUED | OUTPATIENT
Start: 2024-05-08 | End: 2024-05-08 | Stop reason: HOSPADM

## 2024-05-08 RX ORDER — LOSARTAN POTASSIUM 100 MG/1
100 TABLET ORAL DAILY
Qty: 30 TABLET | Refills: 1 | Status: SHIPPED | OUTPATIENT
Start: 2024-05-08 | End: 2024-05-21 | Stop reason: SDUPTHER

## 2024-05-08 RX ADMIN — HYDRALAZINE HYDROCHLORIDE 10 MG: 20 INJECTION INTRAMUSCULAR; INTRAVENOUS at 01:20

## 2024-05-08 RX ADMIN — VERAPAMIL HYDROCHLORIDE 180 MG: 120 TABLET ORAL at 08:49

## 2024-05-08 RX ADMIN — POTASSIUM CHLORIDE 40 MEQ: 1500 TABLET, EXTENDED RELEASE ORAL at 16:47

## 2024-05-08 RX ADMIN — LOSARTAN POTASSIUM 100 MG: 50 TABLET, FILM COATED ORAL at 13:16

## 2024-05-08 RX ADMIN — POTASSIUM CHLORIDE 40 MEQ: 1500 TABLET, EXTENDED RELEASE ORAL at 08:55

## 2024-05-08 RX ADMIN — Medication 400 MG: at 16:47

## 2024-05-08 RX ADMIN — HEPARIN SODIUM 5000 UNITS: 5000 INJECTION INTRAVENOUS; SUBCUTANEOUS at 16:47

## 2024-05-08 SDOH — SOCIAL STABILITY: SOCIAL INSECURITY: ARE YOU OR HAVE YOU BEEN THREATENED OR ABUSED PHYSICALLY, EMOTIONALLY, OR SEXUALLY BY ANYONE?: NO

## 2024-05-08 SDOH — SOCIAL STABILITY: SOCIAL INSECURITY: ARE THERE ANY APPARENT SIGNS OF INJURIES/BEHAVIORS THAT COULD BE RELATED TO ABUSE/NEGLECT?: NO

## 2024-05-08 SDOH — SOCIAL STABILITY: SOCIAL INSECURITY: HAS ANYONE EVER THREATENED TO HURT YOUR FAMILY OR YOUR PETS?: NO

## 2024-05-08 SDOH — SOCIAL STABILITY: SOCIAL INSECURITY: ABUSE: ADULT

## 2024-05-08 SDOH — SOCIAL STABILITY: SOCIAL INSECURITY: WERE YOU ABLE TO COMPLETE ALL THE BEHAVIORAL HEALTH SCREENINGS?: YES

## 2024-05-08 SDOH — SOCIAL STABILITY: SOCIAL INSECURITY: HAVE YOU HAD THOUGHTS OF HARMING ANYONE ELSE?: NO

## 2024-05-08 SDOH — SOCIAL STABILITY: SOCIAL INSECURITY: DOES ANYONE TRY TO KEEP YOU FROM HAVING/CONTACTING OTHER FRIENDS OR DOING THINGS OUTSIDE YOUR HOME?: NO

## 2024-05-08 SDOH — SOCIAL STABILITY: SOCIAL INSECURITY: DO YOU FEEL UNSAFE GOING BACK TO THE PLACE WHERE YOU ARE LIVING?: NO

## 2024-05-08 SDOH — SOCIAL STABILITY: SOCIAL INSECURITY: DO YOU FEEL ANYONE HAS EXPLOITED OR TAKEN ADVANTAGE OF YOU FINANCIALLY OR OF YOUR PERSONAL PROPERTY?: NO

## 2024-05-08 ASSESSMENT — COGNITIVE AND FUNCTIONAL STATUS - GENERAL
CLIMB 3 TO 5 STEPS WITH RAILING: TOTAL
STANDING UP FROM CHAIR USING ARMS: A LOT
HELP NEEDED FOR BATHING: A LOT
DRESSING REGULAR LOWER BODY CLOTHING: A LOT
WALKING IN HOSPITAL ROOM: TOTAL
TOILETING: A LOT
DAILY ACTIVITIY SCORE: 16
DRESSING REGULAR UPPER BODY CLOTHING: A LITTLE
MOVING TO AND FROM BED TO CHAIR: A LOT
MOBILITY SCORE: 12
PERSONAL GROOMING: A LITTLE
TURNING FROM BACK TO SIDE WHILE IN FLAT BAD: A LITTLE
MOVING FROM LYING ON BACK TO SITTING ON SIDE OF FLAT BED WITH BEDRAILS: A LITTLE

## 2024-05-08 ASSESSMENT — LIFESTYLE VARIABLES
HAVE YOU OR SOMEONE ELSE BEEN INJURED AS A RESULT OF YOUR DRINKING: NO
AUDIT-C TOTAL SCORE: 4
HOW OFTEN DURING THE LAST YEAR HAVE YOU BEEN UNABLE TO REMEMBER WHAT HAPPENED THE NIGHT BEFORE BECAUSE YOU HAD BEEN DRINKING: NEVER
HOW OFTEN DURING THE LAST YEAR HAVE YOU FAILED TO DO WHAT WAS NORMALLY EXPECTED FROM YOU BECAUSE OF DRINKING: NEVER
HAS A RELATIVE, FRIEND, DOCTOR, OR ANOTHER HEALTH PROFESSIONAL EXPRESSED CONCERN ABOUT YOUR DRINKING OR SUGGESTED YOU CUT DOWN: NO
AUDIT TOTAL SCORE: 4
HOW OFTEN DO YOU HAVE A DRINK CONTAINING ALCOHOL: 4 OR MORE TIMES A WEEK
HOW OFTEN DURING THE LAST YEAR HAVE YOU FOUND THAT YOU WERE NOT ABLE TO STOP DRINKING ONCE YOU HAD STARTED: NEVER
HOW OFTEN DO YOU HAVE 6 OR MORE DRINKS ON ONE OCCASION: NEVER
HOW MANY STANDARD DRINKS CONTAINING ALCOHOL DO YOU HAVE ON A TYPICAL DAY: 1 OR 2
AUDIT TOTAL SCORE: 0
SKIP TO QUESTIONS 9-10: 1
AUDIT-C TOTAL SCORE: 4
HOW OFTEN DURING THE LAST YEAR HAVE YOU HAD A FEELING OF GUILT OR REMORSE AFTER DRINKING: NEVER
HOW OFTEN DURING THE LAST YEAR HAVE YOU NEEDED AN ALCOHOLIC DRINK FIRST THING IN THE MORNING TO GET YOURSELF GOING AFTER A NIGHT OF HEAVY DRINKING: NEVER

## 2024-05-08 ASSESSMENT — PATIENT HEALTH QUESTIONNAIRE - PHQ9
1. LITTLE INTEREST OR PLEASURE IN DOING THINGS: NOT AT ALL
2. FEELING DOWN, DEPRESSED OR HOPELESS: NOT AT ALL
SUM OF ALL RESPONSES TO PHQ9 QUESTIONS 1 & 2: 0

## 2024-05-08 ASSESSMENT — PAIN SCALES - GENERAL
PAINLEVEL_OUTOF10: 0 - NO PAIN
PAINLEVEL_OUTOF10: 3
PAINLEVEL_OUTOF10: 3

## 2024-05-08 ASSESSMENT — PAIN - FUNCTIONAL ASSESSMENT
PAIN_FUNCTIONAL_ASSESSMENT: 0-10

## 2024-05-08 ASSESSMENT — ACTIVITIES OF DAILY LIVING (ADL)
HOME_MANAGEMENT_TIME_ENTRY: 10
LACK_OF_TRANSPORTATION: NO
ADL_ASSISTANCE: INDEPENDENT
BATHING_ASSISTANCE: MODERATE
ADL_ASSISTANCE: INDEPENDENT

## 2024-05-08 NOTE — PROGRESS NOTES
"Raúl Garcia is a 79 y.o. male on day 1 of admission presenting with Hip fracture requiring operative repair, left, closed, initial encounter (Multi).    Subjective   Patient resting in bed, TUAN, he is requesting to go home. Denies fever/chills, chest pain/shortness of breath.       Objective     Physical Exam  Cardiovascular:      Pulses: Normal pulses.   Pulmonary:      Effort: Pulmonary effort is normal.   Abdominal:      Palpations: Abdomen is soft.   Musculoskeletal:      Comments: Left Lower Extremity:   -Skin intact  -Tender at site of injury with painful ROM.  -Fires DF/PF/EHL/FHL  -SILT in saph/sural/SPN/DPN distributions  -Foot warm, well perfused  -Palpable DP pulse, brisk cap refill  -Compartments soft and compressible     Skin:     General: Skin is warm.      Capillary Refill: Capillary refill takes less than 2 seconds.   Neurological:      General: No focal deficit present.      Mental Status: He is alert.   Psychiatric:         Mood and Affect: Mood normal.         Last Recorded Vitals  Blood pressure 168/90, pulse 96, temperature 36.7 °C (98.1 °F), temperature source Temporal, resp. rate 18, height 1.676 m (5' 5.98\"), weight 65 kg (143 lb 4.8 oz), SpO2 99%.  Intake/Output last 3 Shifts:  I/O last 3 completed shifts:  In: 999 (15.4 mL/kg) [IV Piggyback:999]  Out: - (0 mL/kg)   Weight: 65 kg     Relevant Results    ECG 12 lead    Result Date: 5/8/2024  Normal sinus rhythm Normal ECG When compared with ECG of 26-JAN-2005 08:06, No significant change was found    CT pelvis wo IV contrast    Result Date: 5/7/2024  STUDY: CT Pelvis without IV Contrast; CT Extremity; 05/07/2024 at 10:11 PM INDICATION: Pre-op planning. XR left femur showed left hi arthroplasty in good alignment, and fracture of left proximal femur. COMPARISON: XR left femur of same date, 05/07/24. ACCESSION NUMBER(S): [id number], [id number] ORDERING CLINICIAN: KENYETTA JAY TECHNIQUE:  Thin section axial images were obtained through " the pelvis without intravenous contrast.  Orthogonal reconstructed images were obtained and reviewed.  Thin section axial images were obtained through the left femur without intravenous contrast.  Orthogonal reconstructed images were obtained and reviewed.  Automated mA/kV exposure control was utilized and patient examination was performed in strict accordance with principles of ALARA. FINDINGS: Pelvis: There are degenerative changes in the visualized lower lumbar spine. There are degenerative changes in the SI joints. The bony pelvis appears intact. Prior bilateral hip replacements appear anatomic. The visualized proximal right femur appears intact. Moderate atherosclerotic vascular calcifications. Prominent colonic diverticulosis. Moderately distended urinary bladder. Femur: There is a displaced acute appearing periprosthetic fracture involving mainly the greater trochanter of the proximal left femur, extending into the proximal aspect of the femoral shaft. There is some surrounding swelling/hematoma. The remainder of the left femur appears intact.    1.Acute displaced periprosthetic fracture involving mainly the greater trochanter of the proximal left femur, extending into the proximal aspect of the femoral shaft. There is some surrounding swelling/hematoma. 2.Prior bilateral hip replacements appear anatomic without evidence of dislocation. 3.Degenerative changes in the visualized lower lumbar spine and SI joints. 4.Prominent colonic diverticulosis. Moderately distended urinary bladder. Signed by Jeramy Mejia MD    CT femur left wo IV contrast    Result Date: 5/7/2024  STUDY: CT Pelvis without IV Contrast; CT Extremity; 05/07/2024 at 10:11 PM INDICATION: Pre-op planning. XR left femur showed left hi arthroplasty in good alignment, and fracture of left proximal femur. COMPARISON: XR left femur of same date, 05/07/24. ACCESSION NUMBER(S): [id number], [id number] ORDERING CLINICIAN: KENYETTA JAY TECHNIQUE:   Thin section axial images were obtained through the pelvis without intravenous contrast.  Orthogonal reconstructed images were obtained and reviewed.  Thin section axial images were obtained through the left femur without intravenous contrast.  Orthogonal reconstructed images were obtained and reviewed.  Automated mA/kV exposure control was utilized and patient examination was performed in strict accordance with principles of ALARA. FINDINGS: Pelvis: There are degenerative changes in the visualized lower lumbar spine. There are degenerative changes in the SI joints. The bony pelvis appears intact. Prior bilateral hip replacements appear anatomic. The visualized proximal right femur appears intact. Moderate atherosclerotic vascular calcifications. Prominent colonic diverticulosis. Moderately distended urinary bladder. Femur: There is a displaced acute appearing periprosthetic fracture involving mainly the greater trochanter of the proximal left femur, extending into the proximal aspect of the femoral shaft. There is some surrounding swelling/hematoma. The remainder of the left femur appears intact.    1.Acute displaced periprosthetic fracture involving mainly the greater trochanter of the proximal left femur, extending into the proximal aspect of the femoral shaft. There is some surrounding swelling/hematoma. 2.Prior bilateral hip replacements appear anatomic without evidence of dislocation. 3.Degenerative changes in the visualized lower lumbar spine and SI joints. 4.Prominent colonic diverticulosis. Moderately distended urinary bladder. Signed by Jeramy Mejia MD    XR femur left 2+ views    Result Date: 5/7/2024  STUDY: Femur Radiographs; 05/07/2024 INDICATION: Fall. Hip fracture. COMPARISON: No priors available. ACCESSION NUMBER(S): [id number] ORDERING CLINICIAN: KENYETTA JAY TECHNIQUE:  Two view(s) of the left femur. FINDINGS:  Status post left hip arthroplasty. The components are intact and in good  alignment. There is a fracture of the proximal femur. This may be acute.  The alignment is anatomic.  No soft tissue abnormality is seen.    1. Status post left hip arthroplasty. The components are intact and in good alignment. 2. There is a fracture of the proximal femur. This may be acute. Comparison with any prior studies would be helpful. Signed by Jluis Clark MD    Urgent Care Xray    Result Date: 5/7/2024  Interpreted By:  Schoenberger, Joseph, STUDY: XR URGENT CARE XRAY;  5/7/2024 3:35 pm   INDICATION: Signs/Symptoms:Pain s/p fall today.   COMPARISON: 04/19/2019 the   ACCESSION NUMBER(S): [id number]   ORDERING CLINICIAN: CHINEDU BAUM   TECHNIQUE: Body Part:  Hip Side:  Left Number of Views:  2   FINDINGS: There is a new fracture at the lateral proximal metaphysis of the proximal left femur just inferior to the greater trochanter. Total hip arthroplasty unchanged from prior. Findings suggest interval development of periprosthetic proximal left femur fracture. No other fractures are seen.       Positive exam as detailed above     MACRO: None   Signed by: Joseph Schoenberger 5/7/2024 3:49 PM Dictation workstation:   [id number]    Scheduled medications  atorvastatin, 20 mg, oral, Daily  docusate sodium, 100 mg, oral, BID  famciclovir, 500 mg, oral, q24h  heparin (porcine), 5,000 Units, subcutaneous, q8h  [Held by provider] losartan 100 mg, hydroCHLOROthiazide 12.5 mg for Hyzaar 100/12.5, , oral, Daily  potassium chloride CR, 20 mEq, oral, Once  verapamil SR, 180 mg, oral, Daily      Continuous medications     PRN medications  PRN medications: acetaminophen, hydrALAZINE, oxyCODONE  Results for orders placed or performed during the hospital encounter of 05/07/24 (from the past 24 hour(s))   CBC and Auto Differential   Result Value Ref Range    WBC 12.2 (H) 4.4 - 11.3 x10*3/uL    nRBC 0.0 0.0 - 0.0 /100 WBCs    RBC 3.54 (L) 4.50 - 5.90 x10*6/uL    Hemoglobin 11.5 (L) 13.5 - 17.5 g/dL    Hematocrit 32.4  (L) 41.0 - 52.0 %    MCV 92 80 - 100 fL    MCH 32.5 26.0 - 34.0 pg    MCHC 35.5 32.0 - 36.0 g/dL    RDW 12.3 11.5 - 14.5 %    Platelets 243 150 - 450 x10*3/uL    Neutrophils % 83.0 40.0 - 80.0 %    Immature Granulocytes %, Automated 0.7 0.0 - 0.9 %    Lymphocytes % 6.5 13.0 - 44.0 %    Monocytes % 9.3 2.0 - 10.0 %    Eosinophils % 0.1 0.0 - 6.0 %    Basophils % 0.4 0.0 - 2.0 %    Neutrophils Absolute 10.14 (H) 1.60 - 5.50 x10*3/uL    Immature Granulocytes Absolute, Automated 0.08 0.00 - 0.50 x10*3/uL    Lymphocytes Absolute 0.79 (L) 0.80 - 3.00 x10*3/uL    Monocytes Absolute 1.14 (H) 0.05 - 0.80 x10*3/uL    Eosinophils Absolute 0.01 0.00 - 0.40 x10*3/uL    Basophils Absolute 0.05 0.00 - 0.10 x10*3/uL   Basic metabolic panel   Result Value Ref Range    Glucose 111 (H) 74 - 99 mg/dL    Sodium 128 (L) 136 - 145 mmol/L    Potassium 3.2 (L) 3.5 - 5.3 mmol/L    Chloride 93 (L) 98 - 107 mmol/L    Bicarbonate 25 21 - 32 mmol/L    Anion Gap 13 10 - 20 mmol/L    Urea Nitrogen 26 (H) 6 - 23 mg/dL    Creatinine 1.77 (H) 0.50 - 1.30 mg/dL    eGFR 39 (L) >60 mL/min/1.73m*2    Calcium 8.8 8.6 - 10.3 mg/dL   Phosphorus   Result Value Ref Range    Phosphorus 2.8 2.5 - 4.9 mg/dL   Magnesium   Result Value Ref Range    Magnesium 1.60 1.60 - 2.40 mg/dL   Troponin I, High Sensitivity   Result Value Ref Range    Troponin I, High Sensitivity 97 (HH) 0 - 20 ng/L   B-Type Natriuretic Peptide   Result Value Ref Range     (H) 0 - 99 pg/mL   Protime-INR   Result Value Ref Range    Protime 10.9 9.8 - 12.8 seconds    INR 1.0 0.9 - 1.1   APTT   Result Value Ref Range    aPTT 29 27 - 38 seconds   Type And Screen   Result Value Ref Range    ABO TYPE O     Rh TYPE POS     ANTIBODY SCREEN NEG    Troponin I, High Sensitivity   Result Value Ref Range    Troponin I, High Sensitivity 80 (HH) 0 - 20 ng/L   ECG 12 lead   Result Value Ref Range    Ventricular Rate 87 BPM    Atrial Rate 87 BPM    MS Interval 208 ms    QRS Duration 90 ms    QT Interval  378 ms    QTC Calculation(Bazett) 454 ms    P Axis 81 degrees    R Axis 49 degrees    T Axis 51 degrees    QRS Count 14 beats    Q Onset 228 ms    P Onset 124 ms    P Offset 175 ms    T Offset 417 ms    QTC Fredericia 427 ms   Magnesium   Result Value Ref Range    Magnesium 1.50 (L) 1.60 - 2.40 mg/dL   CBC   Result Value Ref Range    WBC 9.1 4.4 - 11.3 x10*3/uL    nRBC 0.0 0.0 - 0.0 /100 WBCs    RBC 3.23 (L) 4.50 - 5.90 x10*6/uL    Hemoglobin 10.3 (L) 13.5 - 17.5 g/dL    Hematocrit 29.1 (L) 41.0 - 52.0 %    MCV 90 80 - 100 fL    MCH 31.9 26.0 - 34.0 pg    MCHC 35.4 32.0 - 36.0 g/dL    RDW 12.1 11.5 - 14.5 %    Platelets 239 150 - 450 x10*3/uL   Basic metabolic panel   Result Value Ref Range    Glucose 126 (H) 74 - 99 mg/dL    Sodium 129 (L) 136 - 145 mmol/L    Potassium 2.9 (LL) 3.5 - 5.3 mmol/L    Chloride 96 (L) 98 - 107 mmol/L    Bicarbonate 20 (L) 21 - 32 mmol/L    Anion Gap 16 10 - 20 mmol/L    Urea Nitrogen 23 6 - 23 mg/dL    Creatinine 1.34 (H) 0.50 - 1.30 mg/dL    eGFR 54 (L) >60 mL/min/1.73m*2    Calcium 8.5 (L) 8.6 - 10.3 mg/dL                            Assessment/Plan   Principal Problem:    Hip fracture requiring operative repair, left, closed, initial encounter (Multi)    Left periprosthetic femur fx   - fracture non-operative  - 20% flatfoot weightbearing with walker  - Posterior hip precautions.   - Trochanteric precautions (no active Abduction)  - Disposition per PT and primary.   -ortho to sign off, follow up in 2 weeks with repeat Pelvis/hip xrays       I spent 25 minutes in the professional and overall care of this patient.      Giovana Valentino, APRN-CNP

## 2024-05-08 NOTE — PROGRESS NOTES
EKG interpreted by myself.  Normal sinus rhythm at a rate of 87 bpm.  Normal intervals.  Normal axis.  No signs of acute ischemia.  Artifact in V1, V2, V3

## 2024-05-08 NOTE — PROGRESS NOTES
Pharmacy Medication History Review    Raúl Garcia is a 79 y.o. male admitted for Hip fracture requiring operative repair, left, closed, initial encounter (Multi). Pharmacy reviewed the patient's djdog-[id number] medications and allergies for accuracy.    The list below reflectives the updated PTA list. Please review each medication in order reconciliation for additional clarification and justification.  Prior to Admission Medications   Prescriptions Last Dose Informant     atorvastatin (Lipitor) 20 mg tablet 5/6/2024      Sig: Take 1 tablet (20 mg) by mouth once daily.   famciclovir (Famvir) 500 mg tablet 5/6/2024      Sig: Take 1 tablet (500 mg) by mouth once daily in the morning. Take before meals.             losartan-hydrochlorothiazide (Hyzaar) 100-12.5 mg tablet 5/6/2024      Sig: TAKE 1 TABLET BY MOUTH EVERY DAY   multivit-iron-minerals-folic acid (Centrum Silver) 0.4 mg-300 mcg- 250 mcg tab 5/6/2024      Sig: Take 1 tablet by mouth once daily.   verapamil SR (Calan-SR) 180 mg ER tablet 5/6/2024      Sig: TAKE 1 TABLET DAILY      Facility-Administered Medications: None      Allergies  Reviewed by Lilly Encinas, EMT on 5/7/2024        Severity Reactions Comments    Amlodipine Not Specified Swelling     Ibuprofen Not Specified Itching, Other, Unknown     Penicillins Low Rash, Unknown             Below are additional concerns with the patient's PTA list.  Patient verified all medications and doses.    Emily Larson

## 2024-05-08 NOTE — CONSULTS
Reason For Consult  Periprosthetic proximal left femur fracture    History Of Present Illness  Raúl Garcia is a 79 y.o. male presenting after mechanical fall.  Patient reports going down some stairs and tripping on the last step, with his left hip and a on the concrete and the rest of his body landing in the grass. He was able to get up and ambulate in to the house and went to an Urgent Care and was found to have a new fracture at the lateral proximal metaphysis of the proximal left femur just inferior to the greater trochanter. Total hip arthroplasty unchanged from prior. Findings suggest interval development of periprosthetic proximal left femur fracture. No other fractures are seen. Urgent care instructed him to come to [facility] [place] Xray findings. Workup in ED included X-ray showed Status post left hip arthroplasty. The components are intact and in good alignment, there is a fracture of the proximal femur. Labs indicate Na- 128, potassium 3.4, BUN 26, creatinine 1.77, , troponin 97 with a repeat of 80 patient WBC 12.2, hemoglobin 11.5, hematocrit 32.4.  Denies any fever, chills, night sweats, CP, SOB, palpitations, nausea, vomiting, diarrhea, constipation, dysuria, hematuria, hematochezia, hematemesis, flank pain. Ortho consulted for evaluation of periprosthetic left femur fracture.      Past Medical History  He has a past medical history of Impacted cerumen, bilateral (12/07/2016), Other conditions influencing health status (01/20/2014), Personal history of colonic polyps (05/13/2020), Personal history of other diseases of the musculoskeletal system and connective tissue, and Personal history of other diseases of the nervous system and sense organs.    Surgical History  He has a past surgical history that includes Other surgical history (01/20/2014); Total hip arthroplasty (04/12/2017); Other surgical history (05/08/2019); and Cataract extraction (02/19/2014).     Social History  He reports that he has  "never smoked. He has never used smokeless tobacco. He reports current alcohol use of about 2.0 standard drinks of alcohol per week. No history on file for drug use.    Family History  No family history on file.     Allergies  Amlodipine, Ibuprofen, and Penicillins    Review of Systems  ROS: All 10 systems were reviewed and are unremarkable except for those mentioned in HPI.      Physical Exam  PE:  Constitutional: A&Ox3, calm and cooperative, NAD. Family at bedside.   Eyes: PERRL, EOMI  ENMT: Moist mucous membranes, no apparent injuries or lesions.  Head/Neck: NC/AT.   Cardiovascular: Normal rate and regular rhythm. 2+ equal pulses of the distal extremities.  Respiratory/Thorax: CTAB, regular respirations on RA. Good symmetric chest expansion.   Gastrointestinal: Abdomen soft, NTND, +BS x 4  Genitourinary: voiding independently   Extremities: LLE neurovascular intact, cap refill < 2 sec, +SILT, +df/pf, DP/PT/ radial pulses 2+, no drainage noted. TTP over left greater trochanter and mild edema  Neurological: A&Ox3.   Psychological: Appropriate mood and behavior.    Last Recorded Vitals  Blood pressure (!) 179/103, pulse 85, temperature 36.8 °C (98.2 °F), temperature source Temporal, resp. rate 18, height 1.676 m (5' 5.98\"), weight 65 kg (143 lb 4.8 oz), SpO2 100%.    Relevant Results  CT pelvis wo IV contrast    Result Date: 5/7/2024  STUDY: CT Pelvis without IV Contrast; CT Extremity; 05/07/2024 at 10:11 PM INDICATION: Pre-op planning. XR left femur showed left hi arthroplasty in good alignment, and fracture of left proximal femur. COMPARISON: XR left femur of same date, 05/07/24. ACCESSION NUMBER(S): [id number], [id number] ORDERING CLINICIAN: KENYETTA JAY TECHNIQUE:  Thin section axial images were obtained through the pelvis without intravenous contrast.  Orthogonal reconstructed images were obtained and reviewed.  Thin section axial images were obtained through the left femur without intravenous contrast.  " Orthogonal reconstructed images were obtained and reviewed.  Automated mA/kV exposure control was utilized and patient examination was performed in strict accordance with principles of ALARA. FINDINGS: Pelvis: There are degenerative changes in the visualized lower lumbar spine. There are degenerative changes in the SI joints. The bony pelvis appears intact. Prior bilateral hip replacements appear anatomic. The visualized proximal right femur appears intact. Moderate atherosclerotic vascular calcifications. Prominent colonic diverticulosis. Moderately distended urinary bladder. Femur: There is a displaced acute appearing periprosthetic fracture involving mainly the greater trochanter of the proximal left femur, extending into the proximal aspect of the femoral shaft. There is some surrounding swelling/hematoma. The remainder of the left femur appears intact.    1.Acute displaced periprosthetic fracture involving mainly the greater trochanter of the proximal left femur, extending into the proximal aspect of the femoral shaft. There is some surrounding swelling/hematoma. 2.Prior bilateral hip replacements appear anatomic without evidence of dislocation. 3.Degenerative changes in the visualized lower lumbar spine and SI joints. 4.Prominent colonic diverticulosis. Moderately distended urinary bladder. Signed by Jeramy Mejia MD    CT femur left wo IV contrast    Result Date: 5/7/2024  STUDY: CT Pelvis without IV Contrast; CT Extremity; 05/07/2024 at 10:11 PM INDICATION: Pre-op planning. XR left femur showed left hi arthroplasty in good alignment, and fracture of left proximal femur. COMPARISON: XR left femur of same date, 05/07/24. ACCESSION NUMBER(S): [id number], [id number] ORDERING CLINICIAN: KENYETTA JAY TECHNIQUE:  Thin section axial images were obtained through the pelvis without intravenous contrast.  Orthogonal reconstructed images were obtained and reviewed.  Thin section axial images were obtained through  the left femur without intravenous contrast.  Orthogonal reconstructed images were obtained and reviewed.  Automated mA/kV exposure control was utilized and patient examination was performed in strict accordance with principles of ALARA. FINDINGS: Pelvis: There are degenerative changes in the visualized lower lumbar spine. There are degenerative changes in the SI joints. The bony pelvis appears intact. Prior bilateral hip replacements appear anatomic. The visualized proximal right femur appears intact. Moderate atherosclerotic vascular calcifications. Prominent colonic diverticulosis. Moderately distended urinary bladder. Femur: There is a displaced acute appearing periprosthetic fracture involving mainly the greater trochanter of the proximal left femur, extending into the proximal aspect of the femoral shaft. There is some surrounding swelling/hematoma. The remainder of the left femur appears intact.    1.Acute displaced periprosthetic fracture involving mainly the greater trochanter of the proximal left femur, extending into the proximal aspect of the femoral shaft. There is some surrounding swelling/hematoma. 2.Prior bilateral hip replacements appear anatomic without evidence of dislocation. 3.Degenerative changes in the visualized lower lumbar spine and SI joints. 4.Prominent colonic diverticulosis. Moderately distended urinary bladder. Signed by Jeramy Mejia MD    XR femur left 2+ views    Result Date: 5/7/2024  STUDY: Femur Radiographs; 05/07/2024 INDICATION: Fall. Hip fracture. COMPARISON: No priors available. ACCESSION NUMBER(S): [id number] ORDERING CLINICIAN: KENYETTA JAY TECHNIQUE:  Two view(s) of the left femur. FINDINGS:  Status post left hip arthroplasty. The components are intact and in good alignment. There is a fracture of the proximal femur. This may be acute.  The alignment is anatomic.  No soft tissue abnormality is seen.    1. Status post left hip arthroplasty. The components are intact  and in good alignment. 2. There is a fracture of the proximal femur. This may be acute. Comparison with any prior studies would be helpful. Signed by Jluis Clark MD    Urgent Care Xray    Result Date: 5/7/2024  Interpreted By:  Schoenberger, Joseph, STUDY: XR URGENT CARE XRAY;  5/7/2024 3:35 pm   INDICATION: Signs/Symptoms:Pain s/p fall today.   COMPARISON: 04/19/2019 the   ACCESSION NUMBER(S): [id number]   ORDERING CLINICIAN: CHINEDU BAUM   TECHNIQUE: Body Part:  Hip Side:  Left Number of Views:  2   FINDINGS: There is a new fracture at the lateral proximal metaphysis of the proximal left femur just inferior to the greater trochanter. Total hip arthroplasty unchanged from prior. Findings suggest interval development of periprosthetic proximal left femur fracture. No other fractures are seen.       Positive exam as detailed above     MACRO: None   Signed by: Joseph Schoenberger 5/7/2024 3:49 PM Dictation workstation:   [id number]     Results for orders placed or performed during the hospital encounter of 05/07/24 (from the past 24 hour(s))   CBC and Auto Differential   Result Value Ref Range    WBC 12.2 (H) 4.4 - 11.3 x10*3/uL    nRBC 0.0 0.0 - 0.0 /100 WBCs    RBC 3.54 (L) 4.50 - 5.90 x10*6/uL    Hemoglobin 11.5 (L) 13.5 - 17.5 g/dL    Hematocrit 32.4 (L) 41.0 - 52.0 %    MCV 92 80 - 100 fL    MCH 32.5 26.0 - 34.0 pg    MCHC 35.5 32.0 - 36.0 g/dL    RDW 12.3 11.5 - 14.5 %    Platelets 243 150 - 450 x10*3/uL    Neutrophils % 83.0 40.0 - 80.0 %    Immature Granulocytes %, Automated 0.7 0.0 - 0.9 %    Lymphocytes % 6.5 13.0 - 44.0 %    Monocytes % 9.3 2.0 - 10.0 %    Eosinophils % 0.1 0.0 - 6.0 %    Basophils % 0.4 0.0 - 2.0 %    Neutrophils Absolute 10.14 (H) 1.60 - 5.50 x10*3/uL    Immature Granulocytes Absolute, Automated 0.08 0.00 - 0.50 x10*3/uL    Lymphocytes Absolute 0.79 (L) 0.80 - 3.00 x10*3/uL    Monocytes Absolute 1.14 (H) 0.05 - 0.80 x10*3/uL    Eosinophils Absolute 0.01 0.00 - 0.40 x10*3/uL     Basophils Absolute 0.05 0.00 - 0.10 x10*3/uL   Basic metabolic panel   Result Value Ref Range    Glucose 111 (H) 74 - 99 mg/dL    Sodium 128 (L) 136 - 145 mmol/L    Potassium 3.2 (L) 3.5 - 5.3 mmol/L    Chloride 93 (L) 98 - 107 mmol/L    Bicarbonate 25 21 - 32 mmol/L    Anion Gap 13 10 - 20 mmol/L    Urea Nitrogen 26 (H) 6 - 23 mg/dL    Creatinine 1.77 (H) 0.50 - 1.30 mg/dL    eGFR 39 (L) >60 mL/min/1.73m*2    Calcium 8.8 8.6 - 10.3 mg/dL   Phosphorus   Result Value Ref Range    Phosphorus 2.8 2.5 - 4.9 mg/dL   Magnesium   Result Value Ref Range    Magnesium 1.60 1.60 - 2.40 mg/dL   Troponin I, High Sensitivity   Result Value Ref Range    Troponin I, High Sensitivity 97 (HH) 0 - 20 ng/L   B-Type Natriuretic Peptide   Result Value Ref Range     (H) 0 - 99 pg/mL   Protime-INR   Result Value Ref Range    Protime 10.9 9.8 - 12.8 seconds    INR 1.0 0.9 - 1.1   APTT   Result Value Ref Range    aPTT 29 27 - 38 seconds   Type And Screen   Result Value Ref Range    ABO TYPE O     Rh TYPE POS     ANTIBODY SCREEN NEG    Troponin I, High Sensitivity   Result Value Ref Range    Troponin I, High Sensitivity 80 (HH) 0 - 20 ng/L     Scheduled medications  atorvastatin, 20 mg, oral, Daily  docusate sodium, 100 mg, oral, BID  famciclovir, 500 mg, oral, q24h  heparin (porcine), 5,000 Units, subcutaneous, q8h  [Held by provider] losartan 100 mg, hydroCHLOROthiazide 12.5 mg for Hyzaar 100/12.5, , oral, Daily  potassium chloride CR, 20 mEq, oral, Once  verapamil SR, 180 mg, oral, Daily      Continuous medications     PRN medications  PRN medications: acetaminophen, hydrALAZINE, oxyCODONE       Assessment/Plan   Left periprosthetic femur fx:   A/P:   - CT pelvis LLE ordered and reviewed by Dr. Lechuga, CT results acute displaced periprosthetic fracture involving mainly the   greater trochanter of the proximal left femur, extending into the proximal aspect of the femoral shaft. There is some surrounding   swelling/hematoma.   -  fracture non-operative  - 20% flatfoot weightbearing with walker  - Posterior hip precautions. Trochanteric precautions (no active Abduction)  - Follow up in my clinic in 2 weeks with repeat Pelvis/hip xrays  - Disposition per PT and primary.   - Discussed plan with patient who is agreeable with plan and with Dr. Ankush Gomez, APRN-CNP  Ortho

## 2024-05-08 NOTE — CARE PLAN
The patient's goals for the shift include sleep, pain mgmt    The clinical goals for the shift include medication, pain mgmt    Over the shift, the patient did not make progress toward the following goals. Barriers to progression include acute illness. Recommendations to address these barriers include provide a quiet environment.

## 2024-05-08 NOTE — PROGRESS NOTES
05/08/24 1439   Discharge Planning   Patient expects to be discharged to: Home with [facility]     Met with patient and family at bedside. Discussed PT OT recommendation of moderate intensity. Family and patient refused SNF and requested [facility].     Discussed decision with PT, OT, MD. PT OT stressed their recommendation and that patient is barely weightbearing, mod assist to stand, difficulty ambulating or adhering to precautions. SW stressed that recommendation would be SNF. Patient and wife stated that they would not be changing their minds- they plan to discharge home with [facility]. PT, OT, MD notified of decision.

## 2024-05-08 NOTE — DISCHARGE INSTRUCTIONS
Ortho-  - 20% flatfoot weightbearing with walker LLE  - Posterior hip precautions.   - Trochanteric precautions (no active Abduction)  - Follow up with Dr Lechuga in 2 weeks with repeat Pelvis/hip xrays

## 2024-05-08 NOTE — PROGRESS NOTES
Occupational Therapy    Evaluation/Treatment    Patient Name: Raúl Garcia  MRN: [id number]  : 1945  Today's Date: 24  Time Calculation  Start Time: 1032  Stop Time: 1100  Time Calculation (min): 28 min       Assessment:  OT Assessment:  (OT Eval complete, patient with a fall resulting in periprosthetic fracture of L Femur after tripping on stairs. Non-operative management and posterior hip precautions, no abduction and 20% FFWB L LE with poor adherence. Mod intensity recommended.)  Prognosis: Good  Barriers to Discharge: Inaccessible home environment  Medical Staff Made Aware: Yes  End of Session Communication: Bedside nurse  End of Session Patient Position: Bed, 3 rail up, Alarm on  Prognosis: Good  Barriers to Discharge: Inaccessible home environment  Medical Staff Made Aware: Yes  Plan:  Treatment Interventions: ADL retraining, Functional transfer training, Endurance training, Patient/family training, Neuromuscular reeducation  OT Frequency: 3 times per week  OT Discharge Recommendations: Moderate intensity level of continued care  OT - OK to Discharge: Yes  Treatment Interventions: ADL retraining, Functional transfer training, Endurance training, Patient/family training, Neuromuscular reeducation    Subjective   Current Problem:  1. Hip fracture requiring operative repair, left, closed, initial encounter (Multi)          General:   OT Received On: 24  General  Reason for Referral: 78 y/o M presenting with proximal L femur fx s/p mechanical fall on steps  Referred By: JUSTIN Maria (APRN-CNP)  Past Medical History Relevant to Rehab: L hip replacement(2017), aortic valve clerosis, hypercholesterolemia, herpesviral keratitis  Family/Caregiver Present: Yes  Caregiver Feedback:  (Spouse present and receptive)  Co-Treatment: PT  Co-Treatment Reason:  (To maximize patient participation)  Prior to Session Communication: Bedside nurse  Patient Position Received: Bed, 3 rail up  General Comment:  (Patient  supine in bed, plaseand and cooperative, unable to maintain FFWB L LE.)  Precautions:  LE Weight Bearing Status: Flat-foot Weight Bearing (L LE)  Medical Precautions: Fall precautions  Precautions Comment:  (Posterior hip precautions L Hip, No Hip Abduction L Hip)    Pain:  Pain Assessment  Pain Assessment: 0-10  Pain Score: 3  Pain Type: Acute pain  Pain Location: Hip    Objective   Cognition:  Overall Cognitive Status:  (Slight difficulty with following commands)  Orientation Level: Oriented X4  Processing Speed: Delayed     Home Living:  Type of Home: House  Lives With: Spouse  Home Adaptive Equipment: Walker rolling or standard  Home Layout: Two level, Able to live on main level with bedroom/bathroom  Home Access: Stairs to enter with rails  Entrance Stairs-Rails: Right  Entrance Stairs-Number of Steps:  (3)  Bathroom Shower/Tub: Walk-in shower  Bathroom Toilet: Handicapped height  Bathroom Equipment:  (Grab bars,  seat)  Prior Function:  Level of [place]: Independent with ADLs and functional transfers  Receives Help From: Family  ADL Assistance: Independent  Homemaking Assistance:  (Shared with spouse)  Ambulatory Assistance: Independent  Hand Dominance: Right  IADL History:  Homemaking Responsibilities:  (Shared with spouse)  ADL:  Equipment:  (Reacher, LH shoe horn)  Eating Assistance: Independent  Grooming Assistance: Stand by  Bathing Assistance: Moderate (Assist to bathe below knees)  UE Dressing Assistance: Stand by  LE Dressing Assistance: Maximal  Toileting Assistance with Device: Maximal  Functional Assistance: Maximal    Activities of Daily Living:    LE Dressing  LE Dressing: Yes  LE Dressing Adaptive Equipment: Reacher, Sock aide, Dressing stick  Pants Level of Assistance: Maximum assistance (Max cues for proper use of LE adaptive equipment.)  Sock Level of Assistance: Maximum assistance (Assist with doffing socks increased time, Requires use of LE adaptive equipment.)  LE Dressing Where  Assessed: Edge of bed  LE Dressing Comments:  (Max cues for proper use of LE adaptive equipment.)    Activity Tolerance:  Endurance: Tolerates 10 - 20 min exercise with multiple rests  Activity Tolerance Comments:  (Fair activity tolerance.)    Bed Mobility/Transfers: Bed Mobility  Bed Mobility: Yes  Bed Mobility 1  Bed Mobility 1: Supine to sitting  Level of Assistance 1: Minimum assistance  Bed Mobility Comments 1:  (Cues for hand placement and to maintain precautions to avoid abduction of leg.)  Bed Mobility 2  Bed Mobility  2: Sitting to supine  Level of Assistance 2: Moderate assistance  Bed Mobility Comments 2:  (Assist with L LE.)    Transfers  Transfer: Yes  Transfer 1  Transfer From 1: Sit to  Transfer to 1: Stand  Technique 1: Sit to stand  Transfer Device 1: Walker  Transfer Level of Assistance 1: Moderate assistance  Trials/Comments 1:  (Patient unable to maintain FFWB L LE and could not side step, had to return to supine in bed.)    Sitting Balance:  Static Sitting Balance  Static Sitting-Comment/Number of Minutes:  (Good Balance)  Dynamic Sitting Balance  Dynamic Sitting-Comments:  (Good Balance)  Standing Balance:  Static Standing Balance  Static Standing-Comment/Number of Minutes:  (Fair Balance)  Dynamic Standing Balance  Dynamic Standing-Comments:  (Fair Balance)  Strength:  Strength Comments:  (B UEs- 4/5)    Coordination:  Movements are Fluid and Coordinated: No     Outcome Measures: [facility] Daily Activity  Putting on and taking off regular lower body clothing: A lot  Bathing (including washing, rinsing, drying): A lot  Putting on and taking off regular upper body clothing: A little  Toileting, which includes using toilet, bedpan or urinal: A lot  Taking care of personal grooming such as brushing teeth: A little  Eating Meals: None  Daily Activity - Total Score: 16    Education Documentation  Precautions, taught by Bryan Ford OT at 5/8/2024 11:50 AM.  Learner: Patient  Readiness:  Acceptance  Method: Explanation  Response: Needs Reinforcement  Comment: Patient educated regarding Posterior Hip precautions and FFWB L LE. Reinforcement required.    ADL Training, taught by Bryan Ford OT at 5/8/2024 11:50 AM.  Learner: Patient  Readiness: Acceptance  Method: Explanation  Response: Needs Reinforcement  Comment: Patient educated regarding Posterior Hip precautions and FFWB L LE. Reinforcement required.      Goals:  Encounter Problems       Encounter Problems (Active)       ADLs       Patient will perform UB and LB bathing with stand by assist level of assistance.        Start:  05/08/24    Expected End:  05/22/24            Patient with complete upper body dressing with independent level of assistance donning and doffing all UE clothes with no adaptive equipment while edge of bed        Start:  05/08/24    Expected End:  05/22/24            Patient with complete lower body dressing with stand by assist level of assistance donning and doffing all LE clothes  with adaptive equipment while edge of bed        Start:  05/08/24    Expected End:  05/22/24            Patient will complete daily grooming tasks washing face/hair with independent level of assistance and PRN adaptive equipment while edge of bed .       Start:  05/08/24    Expected End:  05/22/24            Patient will complete toileting including hygiene clothing management/hygiene with stand by assist level of assistance and raised toilet seat.       Start:  05/08/24    Expected End:  05/22/24               COGNITION/SAFETY       Patient to demo 100% adherence of Posterior Hip Precautions and WB status during transfers with min verbal cues required.        Start:  05/08/24    Expected End:  05/22/24               TRANSFERS       Patient will perform bed mobility supervision level of assistance and bed rails in order to improve safety and independence with mobility       Start:  05/08/24    Expected End:  05/22/24            Patient  will complete functional transfer to all surfaces with front wheeled walker with supervision level of assistance.       Start:  05/08/24    Expected End:  05/15/24

## 2024-05-08 NOTE — H&P
History Of Present Illness  Raúl Garcia is a 79 y.o. male with past medical history of aortic valve sclerosis, polyneuropathy, hypercholesterolemia, left hip replacement, Herpesviral keratitis left eye presenting after mechanical fall.  Patient reports going down some stairs and tripping on the last step, with his left hip and a on the concrete and the rest of his body landing in the grass.  Due to this patient went to urgent care and was found to have a new fracture at the lateral proximal metaphysis of the proximal left femur just inferior to the greater trochanter. Total hip arthroplasty unchanged from prior. Findings suggest interval development of periprosthetic proximal left femur fracture. No other fractures are seen.  Patient  came to ED after findings.    X-ray in emergency department showed Status post left hip arthroplasty. The components are intact and in good alignment, there is a fracture of the proximal femur.  Ortho has been consulted additional images ordered.  Patient sodium noted to be 128, potassium 3.4, BUN 26, creatinine 1.77, , troponin 97 with a repeat of 80 patient WBC 12.2, hemoglobin 11.5, hematocrit 32.4.  Patient to be admitted for further workup.     Past Medical History  He has a past medical history of Impacted cerumen, bilateral (12/07/2016), Other conditions influencing health status (01/20/2014), Personal history of colonic polyps (05/13/2020), Personal history of other diseases of the musculoskeletal system and connective tissue, and Personal history of other diseases of the nervous system and sense organs.    Surgical History  He has a past surgical history that includes Other surgical history (01/20/2014); Total hip arthroplasty (04/12/2017); Other surgical history (05/08/2019); and Cataract extraction (02/19/2014).     Social History  He reports that he has never smoked. He has never used smokeless tobacco. He reports current alcohol use of about 2.0 standard drinks  of alcohol per week. No history on file for drug use.    Family History  No family history on file.     Allergies  Amlodipine, Ibuprofen, and Penicillins    Review of Systems   Constitutional: Negative.    HENT: Negative.     Eyes: Negative.    Respiratory: Negative.     Cardiovascular: Negative.    Gastrointestinal: Negative.    Genitourinary: Negative.    Musculoskeletal:  Positive for arthralgias, gait problem and myalgias.   Skin: Negative.    Psychiatric/Behavioral: Negative.          Physical Exam  Constitutional:       Appearance: Normal appearance.   HENT:      Head: Normocephalic.      Mouth/Throat:      Mouth: Mucous membranes are moist.   Cardiovascular:      Rate and Rhythm: Normal rate and regular rhythm.      Pulses: Normal pulses.           Dorsalis pedis pulses are 2+ on the right side and 2+ on the left side.   Pulmonary:      Effort: Pulmonary effort is normal.      Breath sounds: Normal breath sounds.   Abdominal:      General: Bowel sounds are normal.      Palpations: Abdomen is soft.   Musculoskeletal:         General: Tenderness present. Normal range of motion.      Cervical back: Neck supple.      Left lower leg: Tenderness present.      Comments: Left hip    Skin:     General: Skin is warm.   Neurological:      General: No focal deficit present.      Mental Status: He is alert and oriented to person, place, and time.   Psychiatric:         Mood and Affect: Mood normal.         Behavior: Behavior normal.          Last Recorded Vitals  /72   Pulse 82   Temp 36.6 °C (97.8 °F) (Oral)   Resp 18   Wt 65.8 kg (145 lb)   SpO2 98%     Relevant Results      Results for orders placed or performed during the hospital encounter of 05/07/24 (from the past 24 hour(s))   CBC and Auto Differential   Result Value Ref Range    WBC 12.2 (H) 4.4 - 11.3 x10*3/uL    nRBC 0.0 0.0 - 0.0 /100 WBCs    RBC 3.54 (L) 4.50 - 5.90 x10*6/uL    Hemoglobin 11.5 (L) 13.5 - 17.5 g/dL    Hematocrit 32.4 (L) 41.0 - 52.0  %    MCV 92 80 - 100 fL    MCH 32.5 26.0 - 34.0 pg    MCHC 35.5 32.0 - 36.0 g/dL    RDW 12.3 11.5 - 14.5 %    Platelets 243 150 - 450 x10*3/uL    Neutrophils % 83.0 40.0 - 80.0 %    Immature Granulocytes %, Automated 0.7 0.0 - 0.9 %    Lymphocytes % 6.5 13.0 - 44.0 %    Monocytes % 9.3 2.0 - 10.0 %    Eosinophils % 0.1 0.0 - 6.0 %    Basophils % 0.4 0.0 - 2.0 %    Neutrophils Absolute 10.14 (H) 1.60 - 5.50 x10*3/uL    Immature Granulocytes Absolute, Automated 0.08 0.00 - 0.50 x10*3/uL    Lymphocytes Absolute 0.79 (L) 0.80 - 3.00 x10*3/uL    Monocytes Absolute 1.14 (H) 0.05 - 0.80 x10*3/uL    Eosinophils Absolute 0.01 0.00 - 0.40 x10*3/uL    Basophils Absolute 0.05 0.00 - 0.10 x10*3/uL   Basic metabolic panel   Result Value Ref Range    Glucose 111 (H) 74 - 99 mg/dL    Sodium 128 (L) 136 - 145 mmol/L    Potassium 3.2 (L) 3.5 - 5.3 mmol/L    Chloride 93 (L) 98 - 107 mmol/L    Bicarbonate 25 21 - 32 mmol/L    Anion Gap 13 10 - 20 mmol/L    Urea Nitrogen 26 (H) 6 - 23 mg/dL    Creatinine 1.77 (H) 0.50 - 1.30 mg/dL    eGFR 39 (L) >60 mL/min/1.73m*2    Calcium 8.8 8.6 - 10.3 mg/dL   Phosphorus   Result Value Ref Range    Phosphorus 2.8 2.5 - 4.9 mg/dL   Magnesium   Result Value Ref Range    Magnesium 1.60 1.60 - 2.40 mg/dL   Troponin I, High Sensitivity   Result Value Ref Range    Troponin I, High Sensitivity 97 (HH) 0 - 20 ng/L   B-Type Natriuretic Peptide   Result Value Ref Range     (H) 0 - 99 pg/mL   Protime-INR   Result Value Ref Range    Protime 10.9 9.8 - 12.8 seconds    INR 1.0 0.9 - 1.1   APTT   Result Value Ref Range    aPTT 29 27 - 38 seconds   Type And Screen   Result Value Ref Range    ABO TYPE O     Rh TYPE POS     ANTIBODY SCREEN NEG    Troponin I, High Sensitivity   Result Value Ref Range    Troponin I, High Sensitivity 80 (HH) 0 - 20 ng/L          Assessment/Plan   Principal Problem:    Hip fracture requiring operative repair, left, closed, initial encounter (Multi)  Left hip fracture in the  setting of mechanical fall  Hyponatremia  Hypokalemia  Elevated troponin    Plan:  Pain control  Ortho consult  Will hold HCTZ, repeat a.m. labs  PT/OT  Replace potassium    Hypertension  -BP acceptable    Herpesviral keratitis left eye   -Continue Famvir    CKD  DVT prophylaxis-heparin       Aron Maria, APRN-CNP

## 2024-05-08 NOTE — PROGRESS NOTES
Physical Therapy    Physical Therapy Evaluation & Treatment    Patient Name: Raúl Garcia  MRN: [id number]  Today's Date: 5/8/2024   Time Calculation  Start Time: 1031  Stop Time: 1058  Time Calculation (min): 27 min    Assessment/Plan   PT Assessment  PT Assessment Results: Decreased strength, Decreased endurance, Impaired balance, Decreased mobility, Decreased coordination, Orthopedic restrictions  Rehab Prognosis: Good  Barriers to Discharge: Stairs to access home as well as difficulty maintaining weight bearing status on LLE  Evaluation/Treatment Tolerance: Patient tolerated treatment well, Patient limited by fatigue  Medical Staff Made Aware: Yes  Strengths: Access to adaptive/assistive products, Support and attitude of living partners  Barriers to Participation:  (None identified)  End of Session Communication: Bedside nurse  Assessment Comment: Pt presents today with decreased BLE strength, balance, and difficulty maintaining hip/weight bearing precautions. Pt is currently at an increased risk of fall and would benefit from continued PT to address the above factors. Pt would benefit from mod intensity therapy at D/C.  End of Session Patient Position: Bed, 3 rail up, Alarm on   IP OR SWING BED PT PLAN  Inpatient or Swing Bed: Inpatient  PT Plan  Treatment/Interventions: Bed mobility, Transfer training, Gait training, Balance training, Strengthening, Endurance training, Therapeutic exercise, Therapeutic activity, Home exercise program, Postural re-education  PT Plan: Skilled PT  PT Frequency: 4 times per week  PT Discharge Recommendations: Moderate intensity level of continued care  PT Recommended Transfer Status: Assist x2, Assistive device  PT - OK to Discharge: Yes (Per PT POC)    Subjective     General Visit Information:  General  Reason for Referral: 80 y/o M presenting with proximal L femur fx s/p mechanical fall on steps  Referred By: JUSTIN Maria (APRN-CNP)  Past Medical History Relevant to Rehab: L hip  replacement(2017), aortic valve clerosis, hypercholesterolemia, herpesviral keratitis  Family/Caregiver Present: Yes  Caregiver Feedback: Wife present and assists pt with the subjective portion of PT evlauation.  Co-Treatment: OT  Co-Treatment Reason: Co-evaluation with OT to maximize pt safety, transfer ability, and participation.  Prior to Session Communication: Bedside nurse  Patient Position Received: Bed, 3 rail up, Alarm off, not on at start of session  Preferred Learning Style: auditory, kinesthetic  General Comment: Pt supine in bed upon PT arrival. Cleasred to participate with RN, and agreeable to co-evaluation with PT/OT.  Home Living:  Home Living  Type of Home: House  Lives With: Spouse  Home Adaptive Equipment: Walker rolling or standard, Cane, Reacher, Long-handled shoehorn (FWW)  Home Layout: Two level, Able to live on main level with bedroom/bathroom  Home Access: Stairs to enter with rails  Entrance Stairs-Rails: Right  Entrance Stairs-Number of Steps: 3  Bathroom Shower/Tub: Walk-in shower  Bathroom Toilet: Handicapped height  Bathroom Equipment:  (Grab bars, shower seat)  Prior Level of Function:  Prior Function Per Pt/Caregiver Report  Level of [place]: Independent with ADLs and functional transfers  Receives Help From: Family  ADL Assistance: Independent  Homemaking Assistance:  (Shared IADLs with spouse)  Ambulatory Assistance: Independent (PRN cane use)  Precautions:  Precautions  LE Weight Bearing Status: Flat-foot Weight Bearing (20% on RLE)  Medical Precautions: Fall precautions  Precautions Comment: LLE Posterior hip precautions . Trochanteric precautions (no active hip abduction LLE)    Objective   Pain:  Pain Assessment  Pain Assessment: 0-10  Pain Score: 3  Pain Type: Acute pain  Pain Location: Hip  Cognition:  Cognition  Orientation Level: Oriented X4  Insight: Moderate  Impulsive: Mildly    General Assessments:  Activity Tolerance  Activity Tolerance Comments: Fair tolerance to  upright activity. Pt limited by ability to maintain hip and weight bearing precautions    Coordination  Movements are Fluid and Coordinated: No  Coordination Comment: Pt demonstrates coordinating BUE and LE movement to maintain weight bearing precautions using FWW    Postural Control  Postural Control: Impaired  Trunk Control: Mild trunk flexion in standing with FWW support    Static Sitting Balance  Static Sitting-Balance Support: Bilateral upper extremity supported, Feet supported  Static Sitting-Level of Assistance: Close supervision  Dynamic Sitting Balance  Dynamic Sitting-Comments: CGA to maintain precautions during forward/lateral leaning to practive adaptive equipment usage.    Static Standing Balance  Static Standing-Balance Support: Bilateral upper extremity supported  Static Standing-Level of Assistance: Moderate assistance (Mod assist+ to maintain weight bearing precautions on LLE)  Static Standing-Comment/Number of Minutes: With FWW support. Pt unable to maintain weight bearing precautions in standing with FWW support  Functional Assessments:  Ambulation/Gait Training  Ambulation/Gait Training Performed: No (Unsafe to attempt ambulation this date d/t demonstration of difficulty maintaining weight bearing precautions on the LLE.)    Stairs  Stairs: No (See ambulation section)  Extremity/Trunk Assessments:  RLE   RLE : Exceptions to WFL  Strength RLE  R Hip Extension:  (At leat 3/5 MMT)  R Knee Extension:  (At leat 3/5 MMT)  R Ankle Dorsiflexion:  (At leat 3/5 MMT)  R Ankle Plantar Flexion:  (At leat 3/5 MMT)  LLE   LLE : Exceptions to WFL  Strength LLE  L Hip Extension:  (At leat 3/5 MMT)  L Knee Extension:  (At leat 3/5 MMT)  L Ankle Dorsiflexion:  (At leat 3/5 MMT)  L Ankle Plantar Flexion:  (At leat 3/5 MMT)  Treatments:  Bed Mobility  Bed Mobility: Yes  Bed Mobility 1  Bed Mobility 1: Supine to sitting  Level of Assistance 1: Minimum assistance, Minimal verbal cues (2-person)  Bed Mobility Comments  1: VC to maintain trochanteric precautions. Assist provided for LE progression to/off the EOB. Assist at trunk into upright sitting.  Bed Mobility 2  Bed Mobility  2: Sitting to supine  Level of Assistance 2: Moderate assistance  Bed Mobility Comments 2: Assist with LLE lift and positioning in bed as well as body alignment inn supine.  Bed Mobility 3  Bed Mobility 3: Scooting  Level of Assistance 3: Close supervision  Bed Mobility Comments 3: Pt performed about 4-5 scoots right in EOB sitting in preparation for sit to supine transition    Transfers  Transfer: Yes  Transfer 1  Transfer From 1: Bed to  Transfer to 1: Stand  Technique 1: Sit to stand  Transfer Device 1: Walker, Gait belt  Transfer Level of Assistance 1: Moderate assistance, +2, Minimal verbal cues  Trials/Comments 1: VC/TC for BUE push from bed to stand instead of pulling from walker to stand. VC for weight shift off the LLE and increased weight bearing through BUE/walker. Pt demonstrated difficulty wit h weightshift with increased weightbearing through the LLE.  Transfers 2  Transfer From 2: Stand to  Transfer to 2: Bed  Technique 2: Stand to sit  Transfer Device 2: Walker (Gait belt)  Transfer Level of Assistance 2: Moderate assistance, +2  Trials/Comments 2: Assist with safe approximation of pt with bed d/t decreased eccentric control and inability to demonstrate UE reach for bed despite VC  Outcome Measures:  [facility] Basic Mobility  Turning from your back to your side while in a flat bed without using bedrails: A little  Moving from lying on your back to sitting on the side of a flat bed without using bedrails: A little  Moving to and from bed to chair (including a wheelchair): A lot  Standing up from a chair using your arms (e.g. wheelchair or bedside chair): A lot  To walk in hospital room: Total  Climbing 3-5 steps with railing: Total  Basic Mobility - Total Score: 12    Encounter Problems       Encounter Problems (Active)       Balance       Goal  1 (Progressing)       Start:  05/08/24    Expected End:  05/22/24       Pt performs all sitting balance with supervision and standing balance with min assist x 2 using LRAD            Mobility       STG - Patient will ambulate (Not Progressing)       Start:  05/08/24    Expected End:  05/22/24       10 ft with min assist x 2 using LRAD and maintaining relevant hip/weightbearing precautions            PT Problem       PT Goal 1 (Not Progressing)       Start:  05/08/24    Expected End:  05/22/24       Pt performs 2 sets of 12 reps of prescribed LE HEP with cuing as needed while maintaining hip precautions            PT Transfers       STG - Patient to transfer to and from sit to supine (Progressing)       Start:  05/08/24    Expected End:  05/22/24       With CGA         STG - Patient will transfer sit to and from stand (Progressing)       Start:  05/08/24    Expected End:  05/22/24       With min assist x 2 using LRAD              Education Documentation  Precautions, taught by Garland Borden PT at 5/8/2024 11:40 AM.  Learner: Significant Other, Patient  Readiness: Acceptance  Method: Explanation, Demonstration  Response: Needs Reinforcement    Body Mechanics, taught by Garland Borden PT at 5/8/2024 11:40 AM.  Learner: Significant Other, Patient  Readiness: Acceptance  Method: Explanation, Demonstration  Response: Needs Reinforcement    Mobility Training, taught by Garland Borden PT at 5/8/2024 11:40 AM.  Learner: Significant Other, Patient  Readiness: Acceptance  Method: Explanation, Demonstration  Response: Needs Reinforcement    Education Comments  No comments found.

## 2024-05-08 NOTE — DISCHARGE SUMMARY
Discharge Diagnosis  Hip fracture requiring operative repair, left, closed, initial encounter (Multi)        Discharge Meds     Your medication list        START taking these medications        Instructions Last Dose Given Next Dose Due   docusate sodium 100 mg capsule  Commonly known as: Colace      Take 1 capsule (100 mg) by mouth 2 times a day.              CONTINUE taking these medications        Instructions Last Dose Given Next Dose Due   atorvastatin 20 mg tablet  Commonly known as: Lipitor           Centrum Silver 0.4 mg-300 mcg- 250 mcg  Generic drug: multivitamin with minerals iron-free           famciclovir 500 mg tablet  Commonly known as: Famvir           losartan 100 mg tablet  Commonly known as: Cozaar      Take 1 tablet (100 mg) by mouth once daily.       verapamil  mg ER tablet  Commonly known as: Calan-SR      TAKE 1 TABLET DAILY              STOP taking these medications      losartan-hydrochlorothiazide 100-12.5 mg tablet  Commonly known as: Hyzaar                  Where to Get Your Medications        These medications were sent to [id number] - [place], [place] - [address] AT [address] [place] [id number], [facility] [place] [address]      Phone: [phone]   docusate sodium 100 mg capsule  losartan 100 mg tablet         Test Results Pending At Discharge  Pending Labs       No current pending labs.            Hospital Course   Admitted for L periprosthetic femur fx. Evaluated by ortho with no plans for surgery and wt bearing as follows:  Left periprosthetic femur fx   - fracture non-operative  - 20% flatfoot weightbearing with walker  - Posterior hip precautions.   - Trochanteric precautions (no active Abduction)    Pt was evaluated by pt/ot and rec for rehab/snf however pt declined and plan is to dc home with [facility], pt/ot. Pt also had raphael, hypoK, and hypoMg on admission. Raphael improved. K and Mg replaced. His home hydrochlorothiazide has been stopped. F/up  "requested with PCP within 5 days for further mgmt. F/up requested with ortho. Pt is very eager to be discharged today.     Pt clinically and hemodynamically stable, tolerating PO intake and room air.   Instructed to F/U with PCP within 5 days.   He understands and agrees with the dc plan.     More than 30 min spent on dc.           Pertinent Physical Exam At Time of Discharge  Physical Exam  Constitutional:       Appearance: Normal appearance.   Cardiovascular:      Rate and Rhythm: Normal rate and regular rhythm.   Pulmonary:      Effort: Pulmonary effort is normal.      Breath sounds: Normal breath sounds.   Abdominal:      General: Abdomen is flat. Bowel sounds are normal.      Palpations: Abdomen is soft.   Musculoskeletal:      Cervical back: Normal range of motion.      Comments: Tenderness in the left hip.    Skin:     General: Skin is warm.   Neurological:      General: No focal deficit present.      Mental Status: He is alert and oriented to person, place, and time. Mental status is at baseline.   Psychiatric:         Mood and Affect: Mood normal.         Behavior: Behavior normal.         Thought Content: Thought content normal.         Judgment: Judgment normal.     /61   Pulse 66   Temp 37.2 °C (98.9 °F)   Resp 16   Ht 1.676 m (5' 5.98\")   Wt 65 kg (143 lb 4.8 oz)   SpO2 100%   BMI 23.14 kg/m²         Outpatient Follow-Up  Future Appointments   Date Time Provider Department Center   5/20/2024  2:20 PM Neri Irizarry MD [id number] [facility]         Jose Marie MD  "

## 2024-05-08 NOTE — PROGRESS NOTES
05/08/24 1554   Discharge Planning   Living Arrangements Spouse/significant other   Support Systems Spouse/significant other   Assistance Needed PTA patient Independent with ADL'S/IADL's uses a walker /cane   Type of Residence Private residence  (demo correct)   Number of Stairs to Enter Residence 3  (w/rail)   Number of Stairs Within Residence 0   Home or Post Acute Services None;In home services   Type of Home Care Services Home OT;Home nursing visits;Home PT   Patient expects to be discharged to: [facility]   Does the patient need discharge transport arranged? No  (wife will provide [phone])   Financial Resource Strain   How hard is it for you to pay for the very basics like food, housing, medical care, and heating? Not very   Housing Stability   In the last 12 months, was there a time when you were not able to pay the mortgage or rent on time? N   In the last 12 months, how many places have you lived? 1   In the last 12 months, was there a time when you did not have a steady place to sleep or slept in a shelter (including now)? N   Transportation Needs   In the past 12 months, has lack of transportation kept you from medical appointments or from getting medications? no  (PCP listed scheduled appointment in a week, wife will drive patient to appointments; no difficulty with affording/obtaining medications; pharmacy [facility])   In the past 12 months, has lack of transportation kept you from meetings, work, or from getting things needed for daily living? No     Care Coordinator Note:  Met patient and wife at bedside to follow up with discharge planning, explained my role as care coordinator  Plan: Left periprosthetic femur fx   - fracture non-operative  Therefore patient is requesting home with home care, explained therapy recommendations after also SW following  discussed recs as well patient and wife continue to decline placement for rehab, also requesting discharge today  Attending notified    Liz RUBINN, RN TCC

## 2024-05-08 NOTE — NURSING NOTE
Dr Jimenez aware of patient's refusal to wear telemetry monitoring,  patient's confusion regarding surgical intervention and blood pressure reading.

## 2024-05-10 ENCOUNTER — TELEPHONE (OUTPATIENT)
Dept: PRIMARY CARE | Facility: CLINIC | Age: 79
End: 2024-05-10
Payer: MEDICARE

## 2024-05-10 ENCOUNTER — PATIENT OUTREACH (OUTPATIENT)
Dept: PRIMARY CARE | Facility: CLINIC | Age: 79
End: 2024-05-10
Payer: MEDICARE

## 2024-05-10 NOTE — PROGRESS NOTES
Discharge Facility: [facility]  Discharge Diagnosis: Hip fracture requiring operative repair, left, closed, initial encounter   Admission Date: 5-7-2024  Discharge Date: 5-8-2024    PCP Appointment Date: 5-  Specialist Appointment Date:   -ORTHO - Follow up with Dr Lechuga in 2 weeks with repeat Pelvis/hip xrays  MAY 22  Hospital Encounter and Summary: Linked   See discharge assessment below for further details  Medications  Medications reviewed with patient/caregiver?: Yes (5/10/2024  1:22 PM)  Is the patient having any side effects they believe may be caused by any medication additions or changes?: No (5/10/2024  1:22 PM)  Does the patient have all medications ordered at discharge?: Yes (5/10/2024  1:22 PM)  Care Management Interventions: Provided patient education (5/10/2024  1:22 PM)  Prescription Comments: START taking:  docusate sodium (Colace)  STOP taking:  losartan-hydrochlorothiazide 100-12.5 mg  tablet (Hyzaar) PATIENT WILL SPEAK WITH DR. SOLANO REGARDING STOPPING THIS MEDICATION. (5/10/2024  1:22 PM)  Is the patient taking all medications as directed (includes completed medication regime)?: Yes (5/10/2024  1:22 PM)  Care Management Interventions: Provided patient education (5/10/2024  1:22 PM)  Medication Comments: OUTREACH TO DR. SOLANO IN  REGARDING MEDICATION CHANGES (5/10/2024  1:22 PM)    Appointments  Does the patient have a primary care provider?: Yes (5/10/2024  1:22 PM)  Care Management Interventions: Verified appointment date/time/provider (5/10/2024  1:22 PM)  Has the patient kept scheduled appointments due by today?: Yes (5/10/2024  1:22 PM)  Care Management Interventions: Advised to schedule with specialist (Referral to Orthopaedic Surgery Jazmin Lcehuga) (5/10/2024  1:22 PM)    Self Management  What is the home health agency?: [facility]  [phone]  PT/OT/NURSE  Start of Care 24-48 hours  SOC 5- (5/10/2024  1:22 PM)  Has home health visited the  patient within 72 hours of discharge?: Yes (5/10/2024  1:22 PM)  What Durable Medical Equipment (DME) was ordered?: PATIENT HAS A WALKER (5/10/2024  1:22 PM)    Patient Teaching  Does the patient have access to their discharge instructions?: Yes (5/10/2024  1:22 PM)  Care Management Interventions: Reviewed instructions with patient (5/10/2024  1:22 PM)  What is the patient's perception of their health status since discharge?: Improving (5/10/2024  1:22 PM)  Is the patient/caregiver able to teach back the hierarchy of who to call/visit for symptoms/problems? PCP, Specialist, Home Health nurse, Urgent Care, ED, 911: Yes (5/10/2024  1:22 PM)  Patient/Caregiver Education Comments: DENIES PAIN.Weight bearing restrictions  Restricted weight-bearing extremity: Left Lower Extremity  LLE: Partial weight-bearing  Specify % of body weight: Other (specify)  Specify other %: 20% Flatfoot weightbearing with walker. Posterior hip precautions.  - Trochanteric precautions (no active Abduction) (5/10/2024  1:22 PM)    Wrap Up  Wrap Up Additional Comments: Spoke w/ pt. Pt identified by name and do. Reviewed discharge instructions, medications, and follow up. Patient denies any further discharge questions/needs at this time.Please take all medications as prescribed and keep all follow-up appointments. Emphasized the importance of hospital follow up.Follow up is needed after discharge to review the hospital recommendations,assess your response to your treatment and make further recommendations for your transition of care.                                                                                                                                                                                                                  Contact your primary care physician with any questions or concerns that arise.You may contact your outpatient specialists office for any questions regarding specifics relating to their recommendations. Confirms  they will attend the scheduled follow up appointment Aware of my availability for non emergent concerns (5/10/2024  1:22 PM)

## 2024-05-13 ENCOUNTER — APPOINTMENT (OUTPATIENT)
Dept: ORTHOPEDIC SURGERY | Facility: HOSPITAL | Age: 79
End: 2024-05-13
Payer: MEDICARE

## 2024-05-19 NOTE — PROGRESS NOTES
Jazmin Lechuga MD   Adult Reconstruction and Joint Replacement Surgery  Phone: [phone]     Fax: [phone]     INITIAL CONSULTATION      Date of Visit:  5/22/2024    CC: Left hip pain    HPI:  Raúl Garcia is a 79 y.o. male who presents with 2 weeks of LEFT hip pain after a fall.     This patient was evaluated in the emergency department at [facility] on 5/7/2024 and found to have a periprosthetic fracture of the left total hip ([place] [employer]).  He was made 20% weightbearing with posterior and trochanteric hip precautions with plans to treat this fracture nonsurgically.  He presents today for his first follow-up after hospital discharge 5/8/2024.      Patient has tried the following Ice, Activity modification, Physical therapy, and Xray. Patient does occasionally have pain at night. Patient can walk indoors only. Patient is currently using walker as assistive device. Primarily complains of lateral hip pain. Patient has difficulty with donning and doffing shoes and socks, stairs, and getting in/out of car . The pain is significantly impacting his ability to perform activities of daily living. Patient reports no longer able to do activities such as walk without lateral hip pain.     The patient reports no fever, chills or night sweats. No wound drainage.     Implants in place: Ricardo Long   Date of initial surgery: 7 years ago - Dr. Chapman - [facility] posterior approach      PROMs  No questionnaires on file.     Past Medical History:   Diagnosis Date    Impacted cerumen, bilateral 12/07/2016    Bilateral hearing loss due to cerumen impaction    Other conditions influencing health status 01/20/2014    Blockage of ear    Personal history of colonic polyps 05/13/2020    History of adenomatous polyp of colon    Personal history of other diseases of the musculoskeletal system and connective tissue     Personal history of arthritis    Personal history of other diseases of the nervous system and sense organs      History of cataract     Documented in chart and reviewed.     Past Surgical History:   Procedure Laterality Date    CATARACT EXTRACTION  02/19/2014    Cataract Surgery    OTHER SURGICAL HISTORY  01/20/2014    Leg Repair    OTHER SURGICAL HISTORY  05/08/2019    Hip replacement    TOTAL HIP ARTHROPLASTY  04/12/2017    Hip Replacement       Allergies: He is allergic to amlodipine, ibuprofen, and penicillins.     Medications:  Current Outpatient Medications   Medication Instructions    atorvastatin (LIPITOR) 20 mg, oral, Daily    famciclovir (FAMVIR) 500 mg, oral, Daily before breakfast    losartan (COZAAR) 100 mg, oral, Daily    multivit-iron-minerals-folic acid (Centrum Silver) 0.4 mg-300 mcg- 250 mcg tab 1 tablet, oral, Daily    verapamil SR (CALAN-SR) 180 mg, oral, Daily, Do not crush or chew.       No family history on file.  Documented in chart and reviewed.     Social History     Tobacco Use    Smoking status: Never    Smokeless tobacco: Never   Substance Use Topics    Alcohol use: Yes     Alcohol/week: 14.0 standard drinks of alcohol     Types: 14 Standard drinks or equivalent per week       Review of Systems: Review of systems completed with medical assistant intake. Please refer to this note.     Falls: The patient had a recent fall resulting in this fracture.     Physical Exam:  BMI: Body mass index is 23.58 kg/m²., which is normal.     Constitutional: The patient is well-appearing and well groomed.     Neurological/Psychiatric: The patient is alert and oriented to person, place and time. The patient has a normal mood and affect.    Skin Examination: The skin over the right lower extremity, left lower extremity, right upper extremity, and left upper extremity is intact without any evidence of infection or rash.    Cardiovascular Examination: There are no varicosities and the skin is normal temperature, capillary refill normal, arterial pulses normal, no edema.    Lymphatic Examination: There is no  lymphatic swelling or palpable lymph nodes present around the involved joint.    Neurological Examination: Bilateral lower extremities are grossly neurologically intact. Sensation normal though with neuropathy bilateral feet, motor function normal.    Gait: The patient ambulates with a coxalgic gait.     Lumbar spine:    No tenderness to palpation midline    Negative straight leg raise bilaterally    Left Hip Examination:    Examination of the hip reveals the skin to be intact. Previous incision: posterior, well healed.    There is moderate tenderness over the greater trochanter with palpable fracture.    There is no obvious swelling.    There is a negative Stinchfield test.    Range of motion is: full extension to 90 degrees of flexion.    The hip internally rotates to 10 degrees (with referred tenderness to lateral hip) and externally rotates to 35 degrees.    Abduction is 30 degrees and adduction is 10 (with referred pain to lateral hip) degrees.    There is groin and buttock pain with hip motion.    Right Hip Examination:    Examination of the hip reveals the skin to be intact.    There is no tenderness over the greater trochanter.    There is no obvious swelling.    There is a negative Stinchfield test.    Range of motion is full extension to 100 degrees of flexion.    The hip internally rotates to 15 and externally rotates 35 degrees.    Abduction is 50 degrees and adduction is 20 degrees.    There is no groin and buttock pain with hip motion.    Right Knee Examination:  Examination of the right knee reveals the skin to be intact. There is no obvious swelling.    There is no tenderness to palpation.    Range of motion is full extension to 120 degrees of flexion.    The knee is stable.    There is no grinding with range of motion.    There is no patellofemoral crepitus.    Left Knee Examination:  Examination of the left knee reveals the skin to be intact. There is no obvious swelling.    There is no tenderness  "to palpation.    Range of motion is full extension to 120 degrees of flexion.    The knee is stable.    There is no grinding with range of motion.    There is no patellofemoral crepitus.    Prior Labs:   Lab Results   Component Value Date    WBC 9.6 05/20/2024    HGB 10.7 (L) 05/20/2024    HCT 31.8 (L) 05/20/2024    MCV 94 05/20/2024     (H) 05/20/2024      Lab Results   Component Value Date    INR 1.0 05/07/2024    INR 1.0 02/28/2019    PROTIME 10.9 05/07/2024    PROTIME 11.0 02/28/2019         Lab Results   Component Value Date    GLUCOSE 107 (H) 05/20/2024    CALCIUM 9.5 05/20/2024     (L) 05/20/2024    K 4.6 05/20/2024    CO2 25 05/20/2024    CL 96 (L) 05/20/2024    BUN 21 05/20/2024    CREATININE 1.39 (H) 05/20/2024      No results found for: \"CKTOTAL\", \"CKMB\", \"CKMBINDEX\", \"TROPONINI\"   Lab Results   Component Value Date    HGBA1C 5.7 (A) 05/16/2022         No results found for: \"CRP\"   No results found for: \"SEDRATE\"     Radiographs:  Radiographs were personally reviewed today with the patient. There is an uncemented total hip arthroplasty in place. Acetabular component is well-aligned and without obvious evidence of bowen-implant lucency, osteolysis, fracture or gross failure. Single-screw supplementary acetabular fixation. Femoral component is in ntural alignment and without obvious evidence of bowen-implant lucency, osteolysis, fracture or gross failure. Mildly displaced greater trochanteric periprosthetic fracture (AG). Small heterotopic ossification over lateral acetabulum.     CT pelvis/femur was also reviewed which mildly demonstrates displaced greater trochanteric femur fracture.  Signs of ingrowth along Gruen zones 1, 2, 6, 7 on CT scan.  No evidence of stem loosening, subsidence or other failure on this imaging study.    Impression:  79 y.o. year old male presents for first outpatient follow-up of left total hip periprosthetic fracture ([place] [employer]), with date of injury " 5/7/2024.    Diagnosis:   Periprosthetic fracture around internal prosthetic left hip joint, subsequent encounter    Recommendations / Plan:    Clinical findings and imaging results were reviewed with the patient today.     I have discussed the options in detail with the patient. We have discussed anti-inflammatory medication, activity modification, physical therapy, and revision total hip replacement surgery. The patient is not indicated for surgery at this time.     Patient will transition to 100% flatfoot weightbearing and will continue with posterior hip and trochanteric precautions for 6 weeks.     A physical therapy prescription was ordered for the patient with attention to hip strengthening and range of motion though with respect for posterior hip and abductor precautions.  Patient will continue their home exercise program. Strategies for pain management using over-the-counter anti-inflammatory medications reviewed.  Encouraged them to maintain range of motion and strength around the knee joints.  They will continue to implement these strategies in addressing their pain.      Follow-up: 2 weeks from today    Follow-up imaging: AP pelvis, left hip crosstable lateral     _____________  Jazmin Lechuga MD   Attending Orthopaedic Surgeon  [facility]    [facility]    Approximately 60 minutes were spent on the following tasks:              Preparing for the patient              Reviewing medical records              Taking a patient history              Performing a physical exam              Reviewing treatment options with the patient              Explaining the risks, potential benefits, and alternative to surgery  Explaining the expected rehabilitation after each treatment option  Explaining the potential long term expectations  Evaluating the diagnostic imaging   Templating pre-operative or current imaging     This office note was transcribed with  dictation software.  Please excuse any typographical errors, program misunderstandings leading to inadvertent insertions or deletions of inappropriate wording, pronoun errors and other unintentional transcription errors not noticed on proof-reading.

## 2024-05-20 ENCOUNTER — OFFICE VISIT (OUTPATIENT)
Dept: PRIMARY CARE | Facility: CLINIC | Age: 79
End: 2024-05-20
Payer: MEDICARE

## 2024-05-20 ENCOUNTER — LAB (OUTPATIENT)
Dept: LAB | Facility: LAB | Age: 79
End: 2024-05-20
Payer: MEDICARE

## 2024-05-20 VITALS
TEMPERATURE: 98.4 F | DIASTOLIC BLOOD PRESSURE: 71 MMHG | HEIGHT: 66 IN | WEIGHT: 146.8 LBS | HEART RATE: 77 BPM | BODY MASS INDEX: 23.59 KG/M2 | SYSTOLIC BLOOD PRESSURE: 145 MMHG

## 2024-05-20 DIAGNOSIS — Z96.649 PERIPROSTHETIC FRACTURE OF HIP, SUBSEQUENT ENCOUNTER: ICD-10-CM

## 2024-05-20 DIAGNOSIS — E78.00 HYPERCHOLESTEROLEMIA: ICD-10-CM

## 2024-05-20 DIAGNOSIS — B97.89 VIRAL KERATITIS: ICD-10-CM

## 2024-05-20 DIAGNOSIS — I10 PRIMARY HYPERTENSION: ICD-10-CM

## 2024-05-20 DIAGNOSIS — H16.8 VIRAL KERATITIS: ICD-10-CM

## 2024-05-20 DIAGNOSIS — Z00.00 ROUTINE GENERAL MEDICAL EXAMINATION AT HEALTH CARE FACILITY: Primary | ICD-10-CM

## 2024-05-20 DIAGNOSIS — M97.8XXD PERIPROSTHETIC FRACTURE OF HIP, SUBSEQUENT ENCOUNTER: ICD-10-CM

## 2024-05-20 DIAGNOSIS — I10 BENIGN ESSENTIAL HYPERTENSION: ICD-10-CM

## 2024-05-20 PROBLEM — M97.8XXA PERIPROSTHETIC HIP FRACTURE: Status: ACTIVE | Noted: 2024-05-07

## 2024-05-20 LAB
ALBUMIN SERPL BCP-MCNC: 3.9 G/DL (ref 3.4–5)
ALP SERPL-CCNC: 118 U/L (ref 33–136)
ALT SERPL W P-5'-P-CCNC: 13 U/L (ref 10–52)
ANION GAP SERPL CALC-SCNC: 14 MMOL/L (ref 10–20)
AST SERPL W P-5'-P-CCNC: 16 U/L (ref 9–39)
BASOPHILS # BLD AUTO: 0.1 X10*3/UL (ref 0–0.1)
BASOPHILS NFR BLD AUTO: 1 %
BILIRUB SERPL-MCNC: 0.9 MG/DL (ref 0–1.2)
BUN SERPL-MCNC: 21 MG/DL (ref 6–23)
CALCIUM SERPL-MCNC: 9.5 MG/DL (ref 8.6–10.6)
CHLORIDE SERPL-SCNC: 96 MMOL/L (ref 98–107)
CHOLEST SERPL-MCNC: 197 MG/DL (ref 0–199)
CHOLESTEROL/HDL RATIO: 4.7
CO2 SERPL-SCNC: 25 MMOL/L (ref 21–32)
CREAT SERPL-MCNC: 1.39 MG/DL (ref 0.5–1.3)
EGFRCR SERPLBLD CKD-EPI 2021: 52 ML/MIN/1.73M*2
EOSINOPHIL # BLD AUTO: 0.19 X10*3/UL (ref 0–0.4)
EOSINOPHIL NFR BLD AUTO: 2 %
ERYTHROCYTE [DISTWIDTH] IN BLOOD BY AUTOMATED COUNT: 12.3 % (ref 11.5–14.5)
GLUCOSE SERPL-MCNC: 107 MG/DL (ref 74–99)
HCT VFR BLD AUTO: 31.8 % (ref 41–52)
HDLC SERPL-MCNC: 41.5 MG/DL
HGB BLD-MCNC: 10.7 G/DL (ref 13.5–17.5)
IMM GRANULOCYTES # BLD AUTO: 0.04 X10*3/UL (ref 0–0.5)
IMM GRANULOCYTES NFR BLD AUTO: 0.4 % (ref 0–0.9)
LDLC SERPL CALC-MCNC: 134 MG/DL
LYMPHOCYTES # BLD AUTO: 1.61 X10*3/UL (ref 0.8–3)
LYMPHOCYTES NFR BLD AUTO: 16.7 %
MCH RBC QN AUTO: 31.8 PG (ref 26–34)
MCHC RBC AUTO-ENTMCNC: 33.6 G/DL (ref 32–36)
MCV RBC AUTO: 94 FL (ref 80–100)
MONOCYTES # BLD AUTO: 1.16 X10*3/UL (ref 0.05–0.8)
MONOCYTES NFR BLD AUTO: 12 %
NEUTROPHILS # BLD AUTO: 6.54 X10*3/UL (ref 1.6–5.5)
NEUTROPHILS NFR BLD AUTO: 67.9 %
NON HDL CHOLESTEROL: 156 MG/DL (ref 0–149)
NRBC BLD-RTO: 0 /100 WBCS (ref 0–0)
PLATELET # BLD AUTO: 682 X10*3/UL (ref 150–450)
POTASSIUM SERPL-SCNC: 4.6 MMOL/L (ref 3.5–5.3)
PROT SERPL-MCNC: 6.8 G/DL (ref 6.4–8.2)
RBC # BLD AUTO: 3.37 X10*6/UL (ref 4.5–5.9)
SODIUM SERPL-SCNC: 130 MMOL/L (ref 136–145)
TRIGL SERPL-MCNC: 109 MG/DL (ref 0–149)
VLDL: 22 MG/DL (ref 0–40)
WBC # BLD AUTO: 9.6 X10*3/UL (ref 4.4–11.3)

## 2024-05-20 PROCEDURE — 1125F AMNT PAIN NOTED PAIN PRSNT: CPT | Performed by: INTERNAL MEDICINE

## 2024-05-20 PROCEDURE — 80053 COMPREHEN METABOLIC PANEL: CPT

## 2024-05-20 PROCEDURE — 1159F MED LIST DOCD IN RCRD: CPT | Performed by: INTERNAL MEDICINE

## 2024-05-20 PROCEDURE — 3077F SYST BP >= 140 MM HG: CPT | Performed by: INTERNAL MEDICINE

## 2024-05-20 PROCEDURE — 1160F RVW MEDS BY RX/DR IN RCRD: CPT | Performed by: INTERNAL MEDICINE

## 2024-05-20 PROCEDURE — 1123F ACP DISCUSS/DSCN MKR DOCD: CPT | Performed by: INTERNAL MEDICINE

## 2024-05-20 PROCEDURE — 1111F DSCHRG MED/CURRENT MED MERGE: CPT | Performed by: INTERNAL MEDICINE

## 2024-05-20 PROCEDURE — 36415 COLL VENOUS BLD VENIPUNCTURE: CPT

## 2024-05-20 PROCEDURE — 1036F TOBACCO NON-USER: CPT | Performed by: INTERNAL MEDICINE

## 2024-05-20 PROCEDURE — 85025 COMPLETE CBC W/AUTO DIFF WBC: CPT

## 2024-05-20 PROCEDURE — 80061 LIPID PANEL: CPT

## 2024-05-20 PROCEDURE — 1170F FXNL STATUS ASSESSED: CPT | Performed by: INTERNAL MEDICINE

## 2024-05-20 PROCEDURE — 3078F DIAST BP <80 MM HG: CPT | Performed by: INTERNAL MEDICINE

## 2024-05-20 PROCEDURE — G0439 PPPS, SUBSEQ VISIT: HCPCS | Performed by: INTERNAL MEDICINE

## 2024-05-20 RX ORDER — ATORVASTATIN CALCIUM 20 MG/1
20 TABLET, FILM COATED ORAL DAILY
Qty: 90 TABLET | Refills: 3 | Status: SHIPPED | OUTPATIENT
Start: 2024-05-20 | End: 2025-05-20

## 2024-05-20 RX ORDER — LOSARTAN POTASSIUM AND HYDROCHLOROTHIAZIDE 12.5; 1 MG/1; MG/1
1 TABLET ORAL DAILY
Qty: 90 TABLET | Refills: 3 | Status: SHIPPED | OUTPATIENT
Start: 2024-05-20 | End: 2024-05-21

## 2024-05-20 RX ORDER — VERAPAMIL HYDROCHLORIDE 180 MG/1
180 TABLET, FILM COATED, EXTENDED RELEASE ORAL DAILY
Qty: 90 TABLET | Refills: 3 | Status: SHIPPED | OUTPATIENT
Start: 2024-05-20

## 2024-05-20 RX ORDER — FAMCICLOVIR 500 MG/1
500 TABLET ORAL
Qty: 90 TABLET | Refills: 3 | Status: SHIPPED | OUTPATIENT
Start: 2024-05-20 | End: 2025-05-15

## 2024-05-20 ASSESSMENT — PATIENT HEALTH QUESTIONNAIRE - PHQ9
1. LITTLE INTEREST OR PLEASURE IN DOING THINGS: NOT AT ALL
SUM OF ALL RESPONSES TO PHQ9 QUESTIONS 1 AND 2: 0
2. FEELING DOWN, DEPRESSED OR HOPELESS: NOT AT ALL

## 2024-05-20 ASSESSMENT — ACTIVITIES OF DAILY LIVING (ADL)
MANAGING_FINANCES: INDEPENDENT
TAKING_MEDICATION: INDEPENDENT
GROCERY_SHOPPING: INDEPENDENT
DRESSING: INDEPENDENT
DOING_HOUSEWORK: INDEPENDENT
BATHING: INDEPENDENT

## 2024-05-20 ASSESSMENT — ENCOUNTER SYMPTOMS
DEPRESSION: 0
LOSS OF SENSATION IN FEET: 1
OCCASIONAL FEELINGS OF UNSTEADINESS: 1

## 2024-05-20 ASSESSMENT — PAIN SCALES - GENERAL: PAINLEVEL: 3

## 2024-05-20 NOTE — PROGRESS NOTES
"Subjective   Reason for Visit: Raúl Garcia is an 79 y.o. male here for a Medicare Wellness visit.               Raúl had a fall 2 weeks ago, he had a periprosthetic fracture of the L hip which was treated non-surgically. He has had ongoing trouble with falls which related to his neuropathy and visual trouble from his viral keratitis.            Patient Care Team:  Neri Irizarry MD as PCP - General  Neri Irizarry MD as PCP - [employer]age PCP  Hector Michael MD as Referring Physician (General Surgery)  Eliseo Campbell MD (Dermatology)  Ro Holley as Consulting Physician (Podiatry)  Madeline Loja RN as Care Manager (Case Management)  Jazimn Lechuga MD as Consulting Physician (Orthopaedic Surgery)     Review of Systems  General: Denies weakness, fever, anorexia. Denies significant change in weight.  HEENT: Denies HA, vision change or problem, hearing loss or tinnitus, voice or swallowing problems.  Resp: Denies SOB, cough, or wheezing.  CV: Denies chest pain or pressure, palpitations, syncope, edema, or claudication.  GI : Denies abdominal pain, diarrhea, constipation, heartburn, rectal bleeding, or change in bowel habits.  : Denies urinary frequency, pain, blood, incontinence, or nocturia.  Sexual: No sexual health or reproductive system concerns.  MSK: Denies significant musculoskeletal pains or limitations EXCEPT AS FOLLOWS...  Neuro: Denies tingling, numbness, weakness, tremor, balance problems, falls, or memory loss.  Psych: Denies Mood or sleep issues.  Derm: No unusual lesions, moles or rashes.    Objective   Vitals:  /71 (BP Location: Left arm, Patient Position: Sitting, BP Cuff Size: Adult)   Pulse 77   Temp 36.9 °C (98.4 °F) (Oral)   Ht 1.676 m (5' 6\")   Wt 66.6 kg (146 lb 12.8 oz)   BMI 23.69 kg/m²       Physical Exam  Unshaven  Lungs CTAP  COR : RRR with 2/6 systolic M at URSB  LLE has significant ecchymosis.  Gait: due to restriction on WB, slow and " non-reciprocal gait d/t walker  Assessment/Plan   Problem List Items Addressed This Visit       Benign essential hypertension    Current Assessment & Plan     A little high in office. Good while in hospital.         Relevant Medications    verapamil SR (Calan-SR) 180 mg ER tablet    Hypercholesterolemia    Current Assessment & Plan     Due for assessment.         Relevant Medications    atorvastatin (Lipitor) 20 mg tablet    Other Relevant Orders    CBC and Auto Differential    Comprehensive Metabolic Panel    Lipid Panel    Viral keratitis    Overview     LOSS OF VISION (SIGNIFICANT) L EYE         Relevant Medications    famciclovir (Famvir) 500 mg tablet    Periprosthetic hip fracture    Overview     Treated non-surgically 5/24 L hip.         Current Assessment & Plan     Was anemic in hospital. Can see he had a significant bleed due to size of ecchymosis at this point of time.          Other Visit Diagnoses       Routine general medical examination at health care facility    -  Primary    Primary hypertension        Relevant Medications    losartan-hydrochlorothiazide (Hyzaar) 100-12.5 mg tablet          Will consider further evaluation depending upon recovery from hip fracture. Handicapped placard 'til 12/31/24.

## 2024-05-20 NOTE — ASSESSMENT & PLAN NOTE
Was anemic in hospital. Can see he had a significant bleed due to size of ecchymosis at this point of time.

## 2024-05-21 DIAGNOSIS — S72.002A HIP FRACTURE REQUIRING OPERATIVE REPAIR, LEFT, CLOSED, INITIAL ENCOUNTER (MULTI): ICD-10-CM

## 2024-05-21 RX ORDER — LOSARTAN POTASSIUM 100 MG/1
100 TABLET ORAL DAILY
Qty: 90 TABLET | Refills: 3 | Status: SHIPPED | OUTPATIENT
Start: 2024-05-21 | End: 2025-05-21

## 2024-05-21 NOTE — RESULT ENCOUNTER NOTE
As I mentioned on the phone, the sodium and chloride are low.   I want to switch losartan hydrochlorothiazide to losartan.  I want to see you again in a month.

## 2024-05-22 ENCOUNTER — PATIENT OUTREACH (OUTPATIENT)
Dept: PRIMARY CARE | Facility: CLINIC | Age: 79
End: 2024-05-22
Payer: MEDICARE

## 2024-05-22 ENCOUNTER — OFFICE VISIT (OUTPATIENT)
Dept: ORTHOPEDIC SURGERY | Facility: HOSPITAL | Age: 79
End: 2024-05-22
Payer: MEDICARE

## 2024-05-22 VITALS — WEIGHT: 146.12 LBS | HEIGHT: 66 IN | BODY MASS INDEX: 23.48 KG/M2

## 2024-05-22 DIAGNOSIS — M97.02XD PERIPROSTHETIC FRACTURE AROUND INTERNAL PROSTHETIC LEFT HIP JOINT, SUBSEQUENT ENCOUNTER: Primary | ICD-10-CM

## 2024-05-22 PROCEDURE — 1160F RVW MEDS BY RX/DR IN RCRD: CPT | Performed by: STUDENT IN AN ORGANIZED HEALTH CARE EDUCATION/TRAINING PROGRAM

## 2024-05-22 PROCEDURE — 99205 OFFICE O/P NEW HI 60 MIN: CPT | Performed by: STUDENT IN AN ORGANIZED HEALTH CARE EDUCATION/TRAINING PROGRAM

## 2024-05-22 PROCEDURE — 1111F DSCHRG MED/CURRENT MED MERGE: CPT | Performed by: STUDENT IN AN ORGANIZED HEALTH CARE EDUCATION/TRAINING PROGRAM

## 2024-05-22 PROCEDURE — 1123F ACP DISCUSS/DSCN MKR DOCD: CPT | Performed by: STUDENT IN AN ORGANIZED HEALTH CARE EDUCATION/TRAINING PROGRAM

## 2024-05-22 PROCEDURE — 99215 OFFICE O/P EST HI 40 MIN: CPT | Performed by: STUDENT IN AN ORGANIZED HEALTH CARE EDUCATION/TRAINING PROGRAM

## 2024-05-22 PROCEDURE — 1159F MED LIST DOCD IN RCRD: CPT | Performed by: STUDENT IN AN ORGANIZED HEALTH CARE EDUCATION/TRAINING PROGRAM

## 2024-05-22 ASSESSMENT — PAIN - FUNCTIONAL ASSESSMENT: PAIN_FUNCTIONAL_ASSESSMENT: 0-10

## 2024-05-22 ASSESSMENT — PAIN SCALES - GENERAL: PAINLEVEL_OUTOF10: 5 - MODERATE PAIN

## 2024-05-22 ASSESSMENT — PAIN DESCRIPTION - DESCRIPTORS: DESCRIPTORS: ACHING;THROBBING

## 2024-05-22 NOTE — PROGRESS NOTES
Unable to reach patient for call back after patient's follow up appointment with PCP. (5-)  LVM with call back number for patient to call if needed

## 2024-06-05 ENCOUNTER — OFFICE VISIT (OUTPATIENT)
Dept: ORTHOPEDIC SURGERY | Facility: HOSPITAL | Age: 79
End: 2024-06-05
Payer: MEDICARE

## 2024-06-05 ENCOUNTER — HOSPITAL ENCOUNTER (OUTPATIENT)
Dept: RADIOLOGY | Facility: HOSPITAL | Age: 79
Discharge: HOME | End: 2024-06-05
Payer: MEDICARE

## 2024-06-05 DIAGNOSIS — M97.02XD PERIPROSTHETIC FRACTURE AROUND INTERNAL PROSTHETIC LEFT HIP JOINT, SUBSEQUENT ENCOUNTER: ICD-10-CM

## 2024-06-05 PROCEDURE — 73502 X-RAY EXAM HIP UNI 2-3 VIEWS: CPT | Mod: LEFT SIDE | Performed by: RADIOLOGY

## 2024-06-05 PROCEDURE — 73502 X-RAY EXAM HIP UNI 2-3 VIEWS: CPT | Mod: LT

## 2024-06-05 PROCEDURE — 99213 OFFICE O/P EST LOW 20 MIN: CPT | Performed by: STUDENT IN AN ORGANIZED HEALTH CARE EDUCATION/TRAINING PROGRAM

## 2024-06-05 PROCEDURE — 1111F DSCHRG MED/CURRENT MED MERGE: CPT | Performed by: STUDENT IN AN ORGANIZED HEALTH CARE EDUCATION/TRAINING PROGRAM

## 2024-06-05 PROCEDURE — 1123F ACP DISCUSS/DSCN MKR DOCD: CPT | Performed by: STUDENT IN AN ORGANIZED HEALTH CARE EDUCATION/TRAINING PROGRAM

## 2024-06-05 NOTE — PROGRESS NOTES
Jazmin Lechuga MD   Adult Reconstruction and Joint Replacement Surgery  Phone: [phone]     Fax: [phone]     Follow-up Hip    Name: Raúl Garcia  : 1945  Date of Visit:  2024    CC: Follow-up LEFT hip    History of Present Illness:  Raúl Garcia is a 79 y.o. male who presents with several weeks of LEFT hip pain. They last saw me for this problem on 24. At that time, the findings of his nonoperative periprosthetic hip fracture were reviewed. He feels significantly improved from last visit and feels he may be able to discontinue the walker soon.     Patient has tried the following Ice, Aleve, Activity modification, Physical therapy, (home therapy) and Xray. Patient does occasionally have pain at night. Patient can walk indoors only. Patient is currently using walker as assistive device. Primarily complains of lateral hip pain. Patient has difficulty with donning and doffing shoes and socks, stairs, and getting in/out of car . The pain is significantly impacting his ability to perform activities of daily living. Patient reports no longer able to do activities such as walk without lateral hip pain.     Medications:  Current Outpatient Medications   Medication Instructions    atorvastatin (LIPITOR) 20 mg, oral, Daily    famciclovir (FAMVIR) 500 mg, oral, Daily before breakfast    losartan (COZAAR) 100 mg, oral, Daily    multivit-iron-minerals-folic acid (Centrum Silver) 0.4 mg-300 mcg- 250 mcg tab 1 tablet, oral, Daily    verapamil SR (CALAN-SR) 180 mg, oral, Daily, Do not crush or chew.       Allergies:  Allergies as of 2024 - Reviewed 2024   Allergen Reaction Noted    Amlodipine Swelling 2022    Ibuprofen Itching, Other, and Unknown 2015    Penicillins Rash and Unknown 2015       Past Medical History:    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 impaction    Other conditions  influencing health status 01/20/2014    Blockage of ear    Personal history of colonic polyps 05/13/2020    History of adenomatous polyp of colon    Personal history of other diseases of the musculoskeletal system and connective tissue     Personal history of arthritis    Personal history of other diseases of the nervous system and sense organs     History of cataract       Past Surgical History:   Past Surgical History:   Procedure Laterality Date    CATARACT EXTRACTION  02/19/2014    Cataract Surgery    OTHER SURGICAL HISTORY  01/20/2014    Leg Repair    OTHER SURGICAL HISTORY  05/08/2019    Hip replacement    TOTAL HIP ARTHROPLASTY  04/12/2017    Hip Replacement       Social History:   Social History     Socioeconomic History    Marital status:      Spouse name: Not on file    Number of children: Not on file    Years of education: Not on file    Highest education level: Not on file   Occupational History    Not on file   Tobacco Use    Smoking status: Never    Smokeless tobacco: Never   Substance and Sexual Activity    Alcohol use: Yes     Alcohol/week: 14.0 standard drinks of alcohol     Types: 14 Standard drinks or equivalent per week    Drug use: Not on file    Sexual activity: Not on file   Other Topics Concern    Not on file   Social History Narrative    Not on file     Social Determinants of Health     Financial Resource Strain: Low Risk  (5/8/2024)    Overall Financial Resource Strain (CARDIA)     Difficulty of Paying Living Expenses: Not very hard   Food Insecurity: Not on file   Transportation Needs: No Transportation Needs (5/8/2024)    PRAPARE - Transportation     Lack of Transportation (Medical): No     Lack of Transportation (Non-Medical): No   Physical Activity: Not on file   Stress: Not on file   Social Connections: Not on file   Intimate Partner Violence: Not on file   Housing Stability: Low Risk  (5/8/2024)    Housing Stability Vital Sign     Unable to Pay for Housing in the Last Year: No      Number of Places Lived in the Last Year: 1     Unstable Housing in the Last Year: No       Family History: No family history on file.    Review of Systems:  Review of systems completed with medical assistant intake. Please refer to this note.     Physical Exam:  BMI: Patient's 24, which is normal.     Constitutional: The patient is well-appearing and well groomed.      Neurological/Psychiatric: The patient is alert and oriented to person, place and time. The patient has a normal mood and affect.     Skin Examination: The skin over the right lower extremity, left lower extremity, right upper extremity, and left upper extremity is intact without any evidence of infection or rash.     Cardiovascular Examination: There are no varicosities and the skin is normal temperature, capillary refill normal, arterial pulses normal, no edema.     Lymphatic Examination: There is no lymphatic swelling or palpable lymph nodes present around the involved joint.     Neurological Examination: Bilateral lower extremities are grossly neurologically intact. Sensation normal though with neuropathy bilateral feet, motor function normal.     Gait: The patient ambulates with a coxalgic gait.      Lumbar spine:     No tenderness to palpation midline     Negative straight leg raise bilaterally     Left Hip Examination:     Examination of the hip reveals the skin to be intact. Previous incision: posterior, well healed.     There is moderate tenderness over the greater trochanter with palpable fracture.    There is no obvious swelling.     There is a negative Stinchfield test.    Range of motion is: full extension to 90 degrees of flexion.    The hip internally rotates to 10 degrees (with referred tenderness to lateral hip) and externally rotates to 35 degrees.     Abduction is 30 degrees and adduction is 10 (with referred pain to lateral hip) degrees.    There is groin and buttock pain with hip motion.     Right Hip Examination:      Examination of the hip reveals the skin to be intact.    There is no tenderness over the greater trochanter.    There is no obvious swelling.     There is a negative Stinchfield test.    Range of motion is full extension to 100 degrees of flexion.    The hip internally rotates to 15 and externally rotates 35 degrees.     Abduction is 50 degrees and adduction is 20 degrees.     There is no groin and buttock pain with hip motion.     Right Knee Examination:  Examination of the right knee reveals the skin to be intact. There is no obvious swelling.     There is no tenderness to palpation.     Range of motion is full extension to 120 degrees of flexion.     The knee is stable.     There is no grinding with range of motion.     There is no patellofemoral crepitus.     Left Knee Examination:  Examination of the left knee reveals the skin to be intact. There is no obvious swelling.     There is no tenderness to palpation.     Range of motion is full extension to 120 degrees of flexion.     The knee is stable.     There is no grinding with range of motion.     There is no patellofemoral crepitus.    Imaging:  Radiographs were personally reviewed today with the patient. There is an uncemented total hip arthroplasty in place. Acetabular component is well-aligned and without obvious evidence of bowen-implant lucency, osteolysis, fracture or gross failure. Single-screw supplementary acetabular fixation. Femoral component is in neutral alignment and without obvious evidence of bowen-implant lucency, osteolysis, fracture or gross failure. Mildly displaced greater trochanteric periprosthetic fracture (AG). Small heterotopic ossification over lateral acetabulum.     Repeat films demonstrate no interval displacement of the fracture fragment.    CT pelvis/femur from prior was also reviewed which mildly demonstrates displaced greater trochanteric femur fracture.  Signs of ingrowth along Gruen zones 1, 2, 6, 7 on CT scan.  No evidence  of stem loosening, subsidence or other failure on this imaging study.    Impression and Plan:  79 y.o. male here for follow-up evaluation of LEFT hip.    Periprosthetic fracture around internal prosthetic left hip joint, subsequent encounter    I have discussed the options in detail with the patient. We have discussed anti-inflammatory medication, activity modification, physical therapy, corticosteroid injections, and revision total hip replacement surgery.  There is no indication for surgical invention at this time.  The periprosthetic fracture fragment has undergone no significant interval displacement as compared to prior films.  The patient is progressing well with physical therapy and mobilization at home.    The risks and benefits of all these treatment options have been discussed in detail.     He will continue physical therapy.  He may weight-bear as tolerated.  Continue posterior hip precautions and abductor precautions.    Strategies for pain management using over-the-counter anti-inflammatory medications reviewed as able.      Encouraged them to maintain range of motion and strength around the knee joints.  They will continue to implement these strategies in addressing their pain.    Encouraged them to maintain range of motion and strength around the hip and knee joints.  They will continue to implement these strategies in addressing their pain.       Recommend the patient continue optimizing nonsurgical treatment interventions as outlined above for management of their periprosthetic fracture.  I will see him back in 1 month with repeat x-rays to evaluate progression of healing.  The patient verbalizes understanding with the recommendations and treatment plan as outlined above and is in agreement.  Questions were addressed.    RTC: 1 month    X-rays at next visit: Postop JOSE series     _____________________  Jazmin Lechuga MD   Attending Orthopaedic Surgeon  [facility]  Assistant    [facility]    This office note was transcribed with dictation software.  Please excuse any typographical errors, program misunderstandings leading to inadvertent insertions or deletions of inappropriate wording, pronoun errors and other unintentional transcription errors not noticed on proof-reading.

## 2024-06-12 ENCOUNTER — DOCUMENTATION (OUTPATIENT)
Dept: PRIMARY CARE | Facility: CLINIC | Age: 79
End: 2024-06-12
Payer: MEDICARE

## 2024-06-12 DIAGNOSIS — R55 SYNCOPE, UNSPECIFIED SYNCOPE TYPE: Primary | ICD-10-CM

## 2024-06-12 NOTE — PROGRESS NOTES
Syncope yesterday. 15 hours at ED at [place]. Was advised to stay but was allowed to leave. Had not eaten. Reports no warning before LOC. Witnessed. Was not diaphoretic. He was surprised at the tumult.     His brother reports that Raúl looked dazed, behaving abnormally, started to have his head nod. He was not responsive. He did not twitch or spasm. Duration of non-responsiveness was 1-2 minutes.     There is no available documentation in Epic Everywhere to get to the medical aspects of this.  I am out of the office until 6/17.  I will try to arrange a cardiology consultation for him before that time.    He asks about a blood thinner. I am certain that there is a need for cardiology evaluation ASAP.    He will need echo and monitor. He has aortic valve disease and if his syncopal episode is related to aortic stenosis, that carries a poor prognosis.

## 2024-06-13 ENCOUNTER — OFFICE VISIT (OUTPATIENT)
Dept: CARDIOLOGY | Facility: CLINIC | Age: 79
End: 2024-06-13
Payer: MEDICARE

## 2024-06-13 VITALS
SYSTOLIC BLOOD PRESSURE: 130 MMHG | HEIGHT: 66 IN | WEIGHT: 145 LBS | DIASTOLIC BLOOD PRESSURE: 70 MMHG | BODY MASS INDEX: 23.3 KG/M2 | HEART RATE: 89 BPM

## 2024-06-13 DIAGNOSIS — R00.1 BRADYCARDIA: ICD-10-CM

## 2024-06-13 DIAGNOSIS — I48.0 PAROXYSMAL ATRIAL FIBRILLATION (MULTI): ICD-10-CM

## 2024-06-13 DIAGNOSIS — R55 SYNCOPE, UNSPECIFIED SYNCOPE TYPE: Primary | ICD-10-CM

## 2024-06-13 PROCEDURE — 3078F DIAST BP <80 MM HG: CPT

## 2024-06-13 PROCEDURE — 1159F MED LIST DOCD IN RCRD: CPT

## 2024-06-13 PROCEDURE — 1123F ACP DISCUSS/DSCN MKR DOCD: CPT

## 2024-06-13 PROCEDURE — 3075F SYST BP GE 130 - 139MM HG: CPT

## 2024-06-13 PROCEDURE — 93000 ELECTROCARDIOGRAM COMPLETE: CPT

## 2024-06-13 PROCEDURE — 1160F RVW MEDS BY RX/DR IN RCRD: CPT

## 2024-06-13 PROCEDURE — 1036F TOBACCO NON-USER: CPT

## 2024-06-13 PROCEDURE — 99204 OFFICE O/P NEW MOD 45 MIN: CPT

## 2024-06-13 RX ORDER — METOPROLOL SUCCINATE 25 MG/1
25 TABLET, EXTENDED RELEASE ORAL DAILY
Qty: 30 TABLET | Refills: 11 | Status: SHIPPED | OUTPATIENT
Start: 2024-06-13 | End: 2025-06-13

## 2024-06-13 NOTE — PROGRESS NOTES
Location of visit: [address]   Type of Visit: New    Chief Complaint:  Patient was referred to Cardiology by Dr. Abdi ref. provider found for No chief complaint on file..    History Of Present Illness:    Raúl Garcia is a 79 y.o. 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    Patient presented to the ED on 06-11-24 for a witnessed syncope episode lasting 30 to 60 seconds, with no seizure activity, preceding symptoms, chest pain, headache, or abdominal pain. He reports a recent left femur fracture without surgery. EMS noted concern for new-onset atrial fibrillation, which patient denies having a history of. On arrival, patient exhibited bradycardia and EKG showed slow atrial fibrillation. Troponin was mildly elevated but stable. CT imaging ruled out acute pulmonary embolism. Current medications include Verapamil SR 180mg, Losartan 100mg, and Atorvastatin 20mg. Patient declined admission for further syncope workup and telemetry, preferring to follow up with his primary care physician of 40 years. He was advised on the potential need for anticoagulation and medication changes for atrial fibrillation, and to return to the ED if symptoms worsen. Patient left the ED in stable condition with BP stable throughout evaluation. Follow-up with cardiology recommended.    At this moment, the patient states he is asymptomatic.   During the episode of syncope, he was sitting down and talking to his brother. The onset was acute, with loss of consciousness and loss of postural tone. He had a rapid and complete recovery with no memory of the event. There were no associated symptoms, including no palpitations, chest pain, or dyspnea before or after the event.  Currently, the patient denies chest pain, dyspnea on  exertion, shortness of breath, orthopnea, PND, nocturia, edema, palpitations, dizziness, lightheadedness, or claudication.    Blood pressure today: 130/70 mmHg    Today's ECG shows sinus rhythm at 80 bpm, normal AV conduction, and normal ventricular repolarization.    Past Medical History:  He has a past medical history of Impacted cerumen, bilateral (12/07/2016), Other conditions influencing health status (01/20/2014), Personal history of colonic polyps (05/13/2020), Personal history of other diseases of the musculoskeletal system and connective tissue, and Personal history of other diseases of the nervous system and sense organs.    Past Surgical History:  He has a past surgical history that includes Other surgical history (01/20/2014); Total hip arthroplasty (04/12/2017); Other surgical history (05/08/2019); and Cataract extraction (02/19/2014).    Social History:  He reports that he has never smoked. He has never used smokeless tobacco. He reports current alcohol use of about 14.0 standard drinks of alcohol per week. No history on file for drug use.    Family History:  No family history on file.  Allergies:  Amlodipine, Ibuprofen, and Penicillins    Outpatient Medications:  Current Outpatient Medications   Medication Instructions    atorvastatin (LIPITOR) 20 mg, oral, Daily    famciclovir (FAMVIR) 500 mg, oral, Daily before breakfast    losartan (COZAAR) 100 mg, oral, Daily    multivit-iron-minerals-folic acid (Centrum Silver) 0.4 mg-300 mcg- 250 mcg tab 1 tablet, oral, Daily    verapamil SR (CALAN-SR) 180 mg, oral, Daily, Do not crush or chew.     Last Recorded Vitals:  There were no vitals filed for this visit.    Physical Exam:      5/22/2024    12:58 PM 5/20/2024     2:29 PM 5/8/2024     4:12 PM 5/8/2024    11:36 AM 5/8/2024     7:29 AM 5/8/2024     5:56 AM   Vitals   Systolic  145 121 111 168 141   Diastolic  71 61 54 90 75   Heart Rate  77 66 79 96 89   Temp  36.9 °C (98.4 °F) 37.2 °C (98.9 °F) 36.4 °C  "(97.5 °F) 36.7 °C (98.1 °F) 36.7 °C (98.1 °F)   Resp   16 16 18 18   Height (in) 1.676 m (5' 6\") 1.676 m (5' 6\")       Weight (lb) 146.12 146.8       BMI 23.58 kg/m2 23.69 kg/m2       BSA (m2) 1.76 m2 1.76 m2       Visit Report Report Report         Wt Readings from Last 5 Encounters:   05/22/24 66.3 kg (146 lb 1.9 oz)   05/20/24 66.6 kg (146 lb 12.8 oz)   05/08/24 65 kg (143 lb 4.8 oz)   02/08/24 67 kg (147 lb 9.6 oz)   05/17/23 71.2 kg (157 lb)       Physical Exam  Vitals reviewed.   HENT:      Head: Normocephalic.      Mouth/Throat:      Pharynx: Oropharynx is clear.   Eyes:      Pupils: Pupils are equal, round, and reactive to light.   Cardiovascular:      Rate and Rhythm: Normal rate.      Pulses: Normal pulses.      Heart sounds: Murmur heard.      Systolic murmur is present with a grade of 3/6.   Pulmonary:      Effort: Pulmonary effort is normal.      Breath sounds: Normal breath sounds.   Abdominal:      General: Abdomen is flat. Bowel sounds are normal.      Palpations: Abdomen is soft.   Musculoskeletal:         General: Normal range of motion.   Skin:     General: Skin is warm and dry.   Neurological:      General: No focal deficit present.      Mental Status: He is alert.   Psychiatric:         Mood and Affect: Mood normal.            Last Labs Reviewed:  CBC -  Recent Labs     05/20/24 1612 05/08/24  0608 05/07/24 1855 05/17/23 1627 05/16/22  1515   WBC 9.6 9.1 12.2* 7.0 7.0   HGB 10.7* 10.3* 11.5* 13.3* 14.3   HCT 31.8* 29.1* 32.4* 39.3* 43.3   * 239 243 291 300   MCV 94 90 92 93 98     CMP -  Recent Labs     05/20/24  1612 05/08/24  0608 05/07/24 1855 05/17/23 1627 05/16/22  1515   * 129* 128* 134* 130*   K 4.6 2.9* 3.2* 4.6 4.4   CL 96* 96* 93* 98 94*   CO2 25 20* 25 25 27   ANIONGAP 14 16 13 16 13   BUN 21 23 26* 21 21   CREATININE 1.39* 1.34* 1.77* 1.53* 1.50*   EGFR 52* 54* 39*  --   --    MG  --  1.50* 1.60  --   --    CALCIUM 9.5 8.5* 8.8 9.5 9.6     Recent Labs     " 05/20/24  1612 05/17/23  1627 05/16/22  1515 05/12/21  1518 05/13/20  1506   ALBUMIN 3.9 4.2 4.5 4.5 4.6   ALKPHOS 118 69 54 67 69   ALT 13 12 20 38 21   AST 16 21 22 38 21   BILITOT 0.9 0.7 0.9 0.5 0.5     LIPID PANEL -   Recent Labs     05/20/24  1612 05/17/23  1627 05/16/22  1515 05/12/21  1518   CHOL 197 180 192 214*   LDLF  --  86 113* 124*   LDLCALC 134*  --   --   --    HDL 41.5 81.8 64.2 66.0   TRIG 109 62 75 118     COAGULATION PANEL -  Recent Labs     05/07/24  1855 02/28/19  1548   INR 1.0 1.0     HEME/ENDO -  Recent Labs     05/16/22  1515 05/12/21  1518 05/08/19  0000   HGBA1C 5.7* 6.2 5.6     CARDIOVASCULAR  Recent Labs     05/07/24 2128 05/07/24  1855   TROPHS 80* 97*   BNP  --  222*     Last Cardiology/Imaging Tests Personally Reviewed (if images available) and Interpreted:  ECG:  Encounter Date: 05/07/24   ECG 12 lead   Result Value    Ventricular Rate 87    Atrial Rate 87    WV Interval 208    QRS Duration 90    QT Interval 378    QTC Calculation(Bazett) 454    P Axis 81    R Axis 49    T Axis 51    QRS Count 14    Q Onset 228    P Onset 124    P Offset 175    T Offset 417    QTC Fredericia 427    Narrative    Normal sinus rhythm  Normal ECG  When compared with ECG of 26-JAN-2005 08:06,  No significant change was found  See ED provider note for full interpretation and clinical correlation  Confirmed by Taylor Merida ([id number]) on 5/8/2024 1:12:45 PM   ECG 12 lead 05/07/2024    Echocardiogram:  Echocardiogram     [facility] at [facility], [address] [place] [address]  Tel [phone] and Fax [phone]    TRANSTHORACIC ECHOCARDIOGRAM REPORT      Patient Name:     CANDICE JIANGANTOLIN Reading Physician:   [id number] Tavo Rasheed MD  Study Date:       6/21/2023      Referring Physician: ANNIE SOLANO  MRN/PID:          [id number]       PCP:  Accession/Order#: [id number]      Department Location: [facility]  YOB: 1945        Fellow:  Gender:           M              Nurse:  Admit Date:                      Sonographer:         Benita Perkins CADENCE  Height:           170.18 cm      CC Report to:  Weight:           68.04 kg       Study Type:          Echocardiogram  BSA:              1.79 m2  Blood Pressure: 170 /80 mmHg    Diagnosis/ICD: I70.0-Atherosclerosis of aorta  Indication:    Aortic vavle sclerosis  Procedure/CPT: Echo Complete w Full [id number]    Patient History:  Pertinent History: Arotic vavle sclorsis.    Study Detail: The following Echo studies were performed: 2D, M-Mode, Doppler and  color flow. Technically challenging study due to prominent lung  artifact.      PHYSICIAN INTERPRETATION:  Left Ventricle: The left ventricular systolic function is hyperdynamic, with an estimated ejection fraction of 70-75%. There are no regional wall motion abnormalities. The left ventricular cavity size is decreased. Spectral Doppler shows a normal pattern of left ventricular diastolic filling.  Left Atrium: The left atrium is normal in size.  Right Ventricle: The right ventricle is normal in size. There is normal right ventricular global systolic function.  Right Atrium: The right atrium is normal in size.  Aortic Valve: The aortic valve is trileaflet. There is mild aortic valve cusp calcification. There are increased aortic valve velocities due to increased flow/dynamic ejection. There is no evidence of aortic valve regurgitation. The peak instantaneous gradient of the aortic valve is 16.8 mmHg. The mean gradient of the aortic valve is 8.8 mmHg.  Mitral Valve: The mitral valve is normal in structure. There is no evidence of mitral valve regurgitation.  Tricuspid Valve: The tricuspid valve is structurally normal. There is trace tricuspid regurgitation. The right ventricular systolic pressure is unable to be estimated.  Pulmonic Valve: The pulmonic valve is not well visualized. There is physiologic pulmonic valve  regurgitation.  Pericardium: There is a trivial pericardial effusion.  Aorta: The aortic root is normal. The Ao Sinus is 3.10 cm. The Asc Ao is 2.60 cm.  Systemic Veins: The inferior vena cava appears to be of normal size. There is IVC inspiratory collapse greater than 50%.  In comparison to the previous echocardiogram(s): There are no prior studies on this patient for comparison purposes.      CONCLUSIONS:  1. Left ventricular systolic function is hyperdynamic with a 70-75% estimated ejection fraction.  2. Poorly visualized anatomical structures due to suboptimal image quality.  3. Left ventricular cavity size is decreased.    QUANTITATIVE DATA SUMMARY:  2D MEASUREMENTS:  Normal Ranges:  LAs:           2.41 cm   (2.7-4.0cm)  RVIDd:         1.73 cm   (0.9-3.6cm)  IVSd:          1.53 cm   (0.6-1.1cm)  LVPWd:         0.95 cm   (0.6-1.1cm)  LVIDd:         3.56 cm   (3.9-5.9cm)  LVIDs:         1.56 cm  LV Mass Index: 82.0 g/m2  LV % FS        56.2 %    LA VOLUME:  Normal Ranges:  LA Vol A4C:        46.7 ml    (22+/-6mL/m2)  LA Vol A2C:        47.2 ml  LA Vol BP:         49.1 ml  LA Vol Index A4C:  26.1ml/m2  LA Vol Index A2C:  26.4 ml/m2  LA Vol Index BP:   27.4 ml/m2  LA Area A4C:       18.3 cm2  LA Area A2C:       17.6 cm2  LA Major Axis A4C: 6.1 cm  LA Major Axis A2C: 5.6 cm  LA Volume Index:   27.4 ml/m2  LA Vol A4C:        43.5 ml  LA Vol A2C:        40.1 ml    RA VOLUME BY A/L METHOD:  Normal Ranges:  RA Vol A4C:        27.4 ml    (8.3-19.5ml)  RA Vol Index A4C:  15.3 ml/m2  RA Area A4C:       11.9 cm2  RA Major Axis A4C: 4.4 cm    AORTA MEASUREMENTS:  Normal Ranges:  Ao Sinus, d: 3.10 cm (2.1-3.5cm)  Asc Ao, d:   2.60 cm (2.1-3.4cm)    LV SYSTOLIC FUNCTION BY 2D PLANIMETRY (MOD):  Normal Ranges:  EF-A4C View: 56.1 % (>=55%)  EF-A2C View: 50.4 %  EF-Biplane:  53.8 %    LV DIASTOLIC FUNCTION:  Normal Ranges:  MV Peak E:        0.82 m/s    (0.7-1.2 m/s)  MV Peak A:        0.67 m/s    (0.42-0.7 m/s)  E/A Ratio:         1.22        (1.0-2.2)  MV e'             0.08 m/s    (>8.0)  MV A Dur:         115.34 msec  E/e' Ratio:       10.25       (<8.0)  PulmV Sys Alan:    55.75 cm/s  PulmV Ruiz Alan:   34.17 cm/s  PulmV S/D Alan:    1.63  PulmV A Revs Alan: 31.60 cm/s  PulmV A Revs Dur: 80.74 msec    MITRAL VALVE:  Normal Ranges:  MV DT: 183 msec (150-240msec)    AORTIC VALVE:  Normal Ranges:  AoV Vmax:                2.05 m/s  (<=1.7m/s)  AoV Peak P.8 mmHg (<20mmHg)  AoV Mean P.8 mmHg  (1.7-11.5mmHg)  LVOT Max Alan:            1.27 m/s  (<=1.1m/s)  AoV VTI:                 35.34 cm  (18-25cm)  LVOT VTI:                23.04 cm  LVOT Diameter:           1.75 cm   (1.8-2.4cm)  AoV Area, VTI:           1.58 cm2  (2.5-5.5cm2)  AoV Area,Vmax:           1.49 cm2  (2.5-4.5cm2)  AoV Dimensionless Index: 0.65      RIGHT VENTRICLE:  RV 1   2.9 cm  RV 2   1.9 cm  RV 3   5.3 cm  TAPSE: 21.0 mm  RV s'  0.18 m/s    TRICUSPID VALVE/RVSP:  Normal Ranges:  Est. RA Pressure: 3 mmHg    PULMONIC VALVE:  Normal Ranges:  PV Accel Time: 44 msec  (>120ms)  PV Max Alan:    1.1 m/s  (0.6-0.9m/s)  PV Max P.0 mmHg    Pulmonary Veins:  PulmV A Revs Dur: 80.74 msec  PulmV A Revs Alan: 31.60 cm/s  PulmV Ruiz Alan:   34.17 cm/s  PulmV S/D Alan:    1.63  PulmV Sys Alan:    55.75 cm/s    AORTA:  Asc Ao Diam 2.63 cm      [id number] Tavo Rasheed MD  Electronically signed on 2023 at 8:12:08 AM      No results found for this or any previous visit from the past 1095 days.    Cath:  No results found for this or any previous visit from the past 1825 days.  No results found for this or any previous visit from the past 3650 days.  No results found for this or any previous visit from the past 1095 days.    Stress Test:  No results found for this or any previous visit from the past 1825 days.  No results found for this or any previous visit from the past 1095 days.    Cardiac CT/MRI:  No results found for this or any previous visit from the past  1825 days.  No results found for this or any previous visit from the past 1095 days.    Other CT:      CV RISK FACTORS:   # Hypertension: Last BP:  .  # Hyperlipidemia: Last Tchol 197 / LDL No results found for requested labs within last 365 days. / HDL 41.5 / TRIG 109 (5/20/2024:  4:12 PM).  # Type II Diabetes Mellitus: Last A1c No results found for requested labs within last 365 days. (No results in last year.).  # Obesity: Last BMI:  .  # CKD: Last BUN/Cr (GFR): 21/1.39 (52), 5/20/2024:  4:12 PM.    ASCV RISK:  The 10-year ASCVD risk score (Tone THOMAS, et al., 2019) is: 43.5%    Values used to calculate the score:      Age: 79 years      Sex: Male      Is Non- : No      Diabetic: No      Tobacco smoker: No      Systolic Blood Pressure: 145 mmHg      Is BP treated: Yes      HDL Cholesterol: 41.5 mg/dL      Total Cholesterol: 197 mg/dL    Assessment/Plan   The patient presented to the ED on 06-11-24 after experiencing a witnessed syncope episode lasting 30 to 60 seconds while at a restaurant. There were no preceding symptoms, seizure activity, chest pain, headache, or abdominal pain reported. EMS noted concern for new-onset atrial fibrillation, which the patient denies having a history of.  -Echocardiograma 2023: Left ventricular systolic function is hyperdynamic with a 70-75% estimated ejection fraction.  -EKG at the ER showed Afib at 54 bpm.     Initial Diagnoses  #New-Onset Atrial Fibrillation - Chadsvasc 2  -Despite the patient's denial of a history of atrial fibrillation, the EKG findings and bradycardia suggest slow atrial fibrillation, which could be a contributing factor to the syncope.  #Bradycardia  -The observed bradycardia could be secondary to the patient's current medication regimen, particularly Verapamil, or a manifestation of the underlying atrial fibrillation.  #Syncope    -Likely multifactorial, but primarily considering bradycardia related etiology given the patient's EKG  findings.    -Medication Effects: Potential contribution of Verapamil to bradycardia and the resultant syncope.    Assessment and Plan:  -The patient’s syncope is likely due to bradycardia in relation to rhythm changes (sinus to atrial fibrillation and atrial fibrillation to sinus), especially in the context of Verapamil use. The mild troponin elevation does not indicate an acute coronary syndrome but warrants close monitoring.     Plan and Recommendations:  -Medication Review: Stop Verapamil. Start metoprolol succinate 25mg once a day  -Holter Monitoring: Holter monitoring to evaluate the frequency and duration of atrial fibrillation and bradycardia episodes.  -Echocardiogram: We will obtain a transthoracic echocardiogram for structural evaluation including ejection fraction, assessment of regional wall motion abnormalities or valvular disease, and further evaluation of hemodynamics.     -Anticoagulation Therapy: Given the atrial fibrillation, and the current ChadsVasc score, anticoagulation therapy is recommended to prevent thromboembolic events.    I educated the patient on recognizing symptoms of worsening arrhythmia or thromboembolic events and to seek immediate medical attention if symptoms recur.    - Patient will follow up with me in the Cardiology office once the results are available  -Check BP at home  - I spent 45 minutes assessing the case between pre-charting, face-to-face patient interaction, and documentation    Rubin Howard MD

## 2024-06-13 NOTE — PATIENT INSTRUCTIONS
Assessment and Plan:  -Your fainting episode is likely due to bradycardia in relation to changes in your heart rhythm, especially given the context of Verapamil use.     Plan and Recommendations:  Stop Verapamil.  Start Metoprolol Succinate 25mg once a day.  Holter Monitoring: We will conduct 24-hour Holter monitoring to evaluate how often and how long you experience atrial fibrillation and bradycardia episodes.  Echocardiogram: We will obtain a transthoracic echocardiogram (ultrasound of the heart) to assess the structure and function of your heart, including measuring your ejection fraction (how well your heart pumps), checking for any regional wall motion abnormalities, valvular disease, and further evaluating your heart's hemodynamics.  Anticoagulation Therapy: Given your atrial fibrillation and ChadsVasc score, anticoagulation therapy is recommended to prevent blood clots and related complications like strokes.    Pay attention on symptoms of worsening heart rhythm or signs of blood clots, such as sudden chest pain, shortness of breath, or weakness in your limbs, and advised you to seek immediate medical attention if these symptoms occur.    You will follow up with me in the Cardiology office once the results of the tests are available.    Please check your blood pressure at home regularly and report any significant changes.    Please reach out if you have any questions or concerns.

## 2024-06-24 ENCOUNTER — APPOINTMENT (OUTPATIENT)
Dept: PRIMARY CARE | Facility: CLINIC | Age: 79
End: 2024-06-24
Payer: MEDICARE

## 2024-06-26 ENCOUNTER — LAB (OUTPATIENT)
Dept: LAB | Facility: LAB | Age: 79
End: 2024-06-26
Payer: MEDICARE

## 2024-06-26 ENCOUNTER — APPOINTMENT (OUTPATIENT)
Dept: PRIMARY CARE | Facility: CLINIC | Age: 79
End: 2024-06-26
Payer: MEDICARE

## 2024-06-26 VITALS
TEMPERATURE: 98.6 F | WEIGHT: 145 LBS | BODY MASS INDEX: 23.4 KG/M2 | HEART RATE: 78 BPM | DIASTOLIC BLOOD PRESSURE: 62 MMHG | SYSTOLIC BLOOD PRESSURE: 95 MMHG

## 2024-06-26 DIAGNOSIS — R55 SYNCOPE, UNSPECIFIED SYNCOPE TYPE: Primary | ICD-10-CM

## 2024-06-26 DIAGNOSIS — I10 BENIGN ESSENTIAL HYPERTENSION: ICD-10-CM

## 2024-06-26 LAB
ANION GAP SERPL CALC-SCNC: 14 MMOL/L (ref 10–20)
BASOPHILS # BLD AUTO: 0.06 X10*3/UL (ref 0–0.1)
BASOPHILS NFR BLD AUTO: 0.9 %
BUN SERPL-MCNC: 36 MG/DL (ref 6–23)
CALCIUM SERPL-MCNC: 8.9 MG/DL (ref 8.6–10.6)
CHLORIDE SERPL-SCNC: 89 MMOL/L (ref 98–107)
CO2 SERPL-SCNC: 26 MMOL/L (ref 21–32)
CREAT SERPL-MCNC: 1.77 MG/DL (ref 0.5–1.3)
EGFRCR SERPLBLD CKD-EPI 2021: 39 ML/MIN/1.73M*2
EOSINOPHIL # BLD AUTO: 0.05 X10*3/UL (ref 0–0.4)
EOSINOPHIL NFR BLD AUTO: 0.8 %
ERYTHROCYTE [DISTWIDTH] IN BLOOD BY AUTOMATED COUNT: 11.8 % (ref 11.5–14.5)
GLUCOSE SERPL-MCNC: 123 MG/DL (ref 74–99)
HCT VFR BLD AUTO: 32.8 % (ref 41–52)
HGB BLD-MCNC: 11.5 G/DL (ref 13.5–17.5)
IMM GRANULOCYTES # BLD AUTO: 0.03 X10*3/UL (ref 0–0.5)
IMM GRANULOCYTES NFR BLD AUTO: 0.5 % (ref 0–0.9)
LYMPHOCYTES # BLD AUTO: 1.59 X10*3/UL (ref 0.8–3)
LYMPHOCYTES NFR BLD AUTO: 25.1 %
MCH RBC QN AUTO: 30.8 PG (ref 26–34)
MCHC RBC AUTO-ENTMCNC: 35.1 G/DL (ref 32–36)
MCV RBC AUTO: 88 FL (ref 80–100)
MONOCYTES # BLD AUTO: 0.83 X10*3/UL (ref 0.05–0.8)
MONOCYTES NFR BLD AUTO: 13.1 %
NEUTROPHILS # BLD AUTO: 3.78 X10*3/UL (ref 1.6–5.5)
NEUTROPHILS NFR BLD AUTO: 59.6 %
NRBC BLD-RTO: 0 /100 WBCS (ref 0–0)
PLATELET # BLD AUTO: 399 X10*3/UL (ref 150–450)
POTASSIUM SERPL-SCNC: 3.9 MMOL/L (ref 3.5–5.3)
RBC # BLD AUTO: 3.73 X10*6/UL (ref 4.5–5.9)
SODIUM SERPL-SCNC: 125 MMOL/L (ref 136–145)
WBC # BLD AUTO: 6.3 X10*3/UL (ref 4.4–11.3)

## 2024-06-26 PROCEDURE — 80048 BASIC METABOLIC PNL TOTAL CA: CPT

## 2024-06-26 PROCEDURE — 36415 COLL VENOUS BLD VENIPUNCTURE: CPT

## 2024-06-26 PROCEDURE — 99214 OFFICE O/P EST MOD 30 MIN: CPT | Performed by: INTERNAL MEDICINE

## 2024-06-26 PROCEDURE — 3074F SYST BP LT 130 MM HG: CPT | Performed by: INTERNAL MEDICINE

## 2024-06-26 PROCEDURE — 1126F AMNT PAIN NOTED NONE PRSNT: CPT | Performed by: INTERNAL MEDICINE

## 2024-06-26 PROCEDURE — 1123F ACP DISCUSS/DSCN MKR DOCD: CPT | Performed by: INTERNAL MEDICINE

## 2024-06-26 PROCEDURE — G2211 COMPLEX E/M VISIT ADD ON: HCPCS | Performed by: INTERNAL MEDICINE

## 2024-06-26 PROCEDURE — 1036F TOBACCO NON-USER: CPT | Performed by: INTERNAL MEDICINE

## 2024-06-26 PROCEDURE — 85025 COMPLETE CBC W/AUTO DIFF WBC: CPT

## 2024-06-26 PROCEDURE — 3078F DIAST BP <80 MM HG: CPT | Performed by: INTERNAL MEDICINE

## 2024-06-26 ASSESSMENT — PAIN SCALES - GENERAL: PAINLEVEL: 0-NO PAIN

## 2024-06-26 NOTE — PROGRESS NOTES
Subjective   Patient ID: Raúl Garcia is a 79 y.o. male who presents for Follow-up.  This is the first time I am seeing Raúl since his syncopal episode.  Please see my note for the details.  He is in today with his wife who tells me that he had actually had a previous syncopal episode on the day of his periprosthetic hip fracture.  That episode occurred while he was in the urgent care center, after having had x-rays.  At that time, he slumped over in the chair for about a minute before becoming conscious again.  This is a new piece of history to me.    He did see Dr. Howard 2 weeks ago who discontinued verapamil and started metoprolol.  An echo and Holter were ordered.  Neither has been completed yet.    There has been no recurrent syncope.  Vernon has no complaints.  It is noted that his blood pressure has been on the lower side at home in the interval.  He has been off of the diuretic since I noted significant hypokalemia.      Current Outpatient Medications   Medication Instructions    apixaban (ELIQUIS) 5 mg, oral, 2 times daily    atorvastatin (LIPITOR) 20 mg, oral, Daily    famciclovir (FAMVIR) 500 mg, oral, Daily before breakfast    losartan (COZAAR) 100 mg, oral, Daily    metoprolol succinate XL (TOPROL-XL) 25 mg, oral, Daily, Do not crush or chew.    multivit-iron-minerals-folic acid (Centrum Silver) 0.4 mg-300 mcg- 250 mcg tab 1 tablet, oral, Daily     Review of Systems  All other systems are reviewed and are without complaint.    Objective   BP 95/62 (BP Location: Left arm, Patient Position: Sitting, BP Cuff Size: Adult)   Pulse 78   Temp 37 °C (98.6 °F) (Oral)   Wt 65.8 kg (145 lb)   BMI 23.40 kg/m²   Physical Exam  Blood pressure is 104/52 on repeat.  Cardiac exam seems to show a bigeminal rhythm with a 2 out of 6 to 3 out of 6 systolic murmur that radiates to the upper right sternal border.  There is no peripheral edema.      Assessment/Plan   Problem List Items Addressed This Visit              ICD-10-CM    Benign essential hypertension I10     Perhaps overtreated. Hold losartan. Reassess at next visit.         Relevant Orders    CBC and Auto Differential    Basic Metabolic Panel    Syncope - Primary R55     Awaiting workup. F/U 4 weeks.

## 2024-06-28 DIAGNOSIS — E87.1 HYPONATREMIA: Primary | ICD-10-CM

## 2024-06-28 NOTE — PROGRESS NOTES
Commented on hyponatremia. Has been eating more and more red meat. BP is normal at home. Will drink less water and use some gatorade..

## 2024-07-02 ENCOUNTER — LAB (OUTPATIENT)
Dept: LAB | Facility: LAB | Age: 79
End: 2024-07-02
Payer: MEDICARE

## 2024-07-02 ENCOUNTER — EVALUATION (OUTPATIENT)
Dept: PHYSICAL THERAPY | Facility: CLINIC | Age: 79
End: 2024-07-02
Payer: MEDICARE

## 2024-07-02 DIAGNOSIS — E87.1 HYPONATREMIA: ICD-10-CM

## 2024-07-02 DIAGNOSIS — M97.02XD PERIPROSTHETIC FRACTURE AROUND INTERNAL PROSTHETIC LEFT HIP JOINT, SUBSEQUENT ENCOUNTER: ICD-10-CM

## 2024-07-02 DIAGNOSIS — R29.898 WEAKNESS OF LEFT HIP: Primary | ICD-10-CM

## 2024-07-02 DIAGNOSIS — M25.552 PAIN OF LEFT HIP: ICD-10-CM

## 2024-07-02 DIAGNOSIS — R26.89 BALANCE DISORDER: ICD-10-CM

## 2024-07-02 LAB
ANION GAP SERPL CALC-SCNC: 11 MMOL/L (ref 10–20)
BUN SERPL-MCNC: 24 MG/DL (ref 6–23)
CALCIUM SERPL-MCNC: 8.7 MG/DL (ref 8.6–10.6)
CHLORIDE SERPL-SCNC: 94 MMOL/L (ref 98–107)
CO2 SERPL-SCNC: 27 MMOL/L (ref 21–32)
CREAT SERPL-MCNC: 1.32 MG/DL (ref 0.5–1.3)
EGFRCR SERPLBLD CKD-EPI 2021: 55 ML/MIN/1.73M*2
GLUCOSE SERPL-MCNC: 100 MG/DL (ref 74–99)
POTASSIUM SERPL-SCNC: 4.2 MMOL/L (ref 3.5–5.3)
SODIUM SERPL-SCNC: 128 MMOL/L (ref 136–145)

## 2024-07-02 PROCEDURE — 97161 PT EVAL LOW COMPLEX 20 MIN: CPT | Mod: GP

## 2024-07-02 PROCEDURE — 80048 BASIC METABOLIC PNL TOTAL CA: CPT

## 2024-07-02 PROCEDURE — 97110 THERAPEUTIC EXERCISES: CPT | Mod: GP

## 2024-07-02 PROCEDURE — 36415 COLL VENOUS BLD VENIPUNCTURE: CPT

## 2024-07-02 NOTE — PROGRESS NOTES
Physical Therapy  Physical Therapy Orthopedic Evaluation    Patient Name: Raúl Garcia  MRN: [id number]  Today's Date: 7/2/2024  Time Calculation  Start Time: 1351  Stop Time: 1430  Time Calculation (min): 39 min  PT Evaluation Time Entry  PT Evaluation (Low) Time Entry: 25 PT Therapeutic Procedures Time Entry  Therapeutic Exercise Time Entry: 10          Insurance:  Visit number: 1 of MN  Authorization info: Auth needed   Insurance Type: Payor: ANTH MEDICARE / Plan: [employer] MEDICARE ADVANTAGE / Product Type: *No Product type* /      Current Problem  Problem List Items Addressed This Visit             ICD-10-CM    Pain of left hip M25.552    Relevant Orders    Follow Up In Physical Therapy    Weakness of left hip - Primary R29.898    Relevant Orders    Follow Up In Physical Therapy    Balance disorder R26.89    Relevant Orders    Follow Up In Physical Therapy     Other Visit Diagnoses         Codes    Periprosthetic fracture around internal prosthetic left hip joint, subsequent encounter     M97.02XD            General:  General  Reason for Referral: L hip pain and balance disorder  Referred By: Dr. Ankush MD    Precautions:  Precautions  Precautions Comment: hx of syncope, A fib  Medical History Form: Reviewed (scanned into chart)    Subjective:   WILMA: Pt reports to PT due to a fall that occurred on 5/7/24 when he was walking down the outside steps and fell onto his L hip. Pt has BL hip replacements and upon xray revealed a femur fx on the L hip but the replacement was not damaged. Pt is currently ambulating with a walker due to needing extra support when walking. He is not having much pain in his hip besides when he fell.     Previous Med Management: x ray     PMH: BL hip replacement, neuropathy in his feet, balance problem due to his eye sight     Aggravating Factors: Nothing much when he is using his walker     Relieving Factors: Aleve    Pain/Symptom Scale:  Current: 0/10  Worst: 2/10  Best:  0/10  Location/Nature: Lateral aspect of the hip and describes as soreness    Meds: Aleve     PLOF: Pt had no problems before the fall besides some balance problems   ADL: Takes his time and uses assistive devices   Work:  Labor and    Athletics: Walking for fitness   Recreation/ Hobbies: Active in his [employer]     Goals: Pt would like to get back to his PLOF that he was at prior to his fall     Language: English      Red Flags: Do you have any of the following? No  Fever/chills, unexplained weight changes, dizziness/fainting, unexplained change in bowel or bladder functions, unexplained malaise or muscle weakness, night pain/sweats, numbness or tingling    Objective:  Palpation / Observation   Pt had no increase in discomfort upon palpation of L hip musculature.     Hip ROM   Flexion- R: 100 L: 100 pain free   Abduction- R: 30 L: 30  ER- R: 15 L: 15  IR- R: 10 L: 10    Hip MMT  Flexion- R: 4+/5  L: 4+/5  Abduction- R: 4/5 L:  4/5  Adduction- R:  4+/5 L:  4+/5  Extension- R: 4/5 L: 4/5    Outcome Measures:  Other Measures  Lower Extremity Funtional Score (LEFS): 40/80     Treatments:  Access Code: [email or web address]  URL: [email or web address]  Date: 07/02/2024  Prepared by: Magdaleno Costa    Exercises  - Hooklying Clamshell with Resistance  - 1 x daily - 7 x weekly - 3 sets - 10 reps  - Supine Hip Adduction Isometric with Ball  - 1 x daily - 7 x weekly - 3 sets - 10 reps  - Supine Quad Set  - 1 x daily - 7 x weekly - 3 sets - 10 reps  - Supine Heel Slide  - 1 x daily - 7 x weekly - 3 sets - 10 reps    EDUCATION: Home exercise program, plan of care, activity modifications, pain management, and injury pathology       Assessment:     Patient is a 79 year old male that presents to physical therapy evaluation today due to complaints of L hip pain from femur fx and balance disorder. Patient impairments include flexibility deficits of L hip, strength deficits in hip musculature musculature, and  motor control deficits including lack of balance when walking without assistive device. Due to these impairments, the patients has the following functional limitations: pain with standing up, difficulty walking without assistive device, and an overall decrease in functional mobility. Skilled therapy recommended in order to to address their impairments and progress towards the associated functional goals. Prognosis is good secondary to their current presentation and client understanding. The pt demonstrates a good understanding of their current plan of care, rehab expectations, and prognosis.       Plan:     Planned Interventions include: therapeutic exercise, self-care home management, manual therapy, therapeutic activities, gait training, neuromuscular coordination, vasopneumatic, dry needling, aquatic therapy  Frequency: 1 x Week  Duration: 8 Weeks  Goals: Set and discussed today  Active       PT Problem       PT Goals       Start:  07/02/24       1.Pt will increase LEFS with 55/80 or better in order to demo an increase in L hip function  2.  Pt will demo 4+/5 or all hip/knee MMT in order to demo an increase in LE strength   3. Pt will be able to ascend and descend 10 steps with reciprocal gait pattern and proper knee control in order to demo and increase in functional mobility   4. Pt will demo compliance and independence with HEP   5.  Pt will be able to walk for 10 mins with step through gait pattern and non antalgic gait pattern with no AD  6. Pt will increase miniBest-est by a minimum of 3 points in order to demo an increase in functional balance              Plan of care was developed with input and agreement by the patient      Magdaleno Costa PT

## 2024-07-03 ENCOUNTER — HOSPITAL ENCOUNTER (OUTPATIENT)
Dept: RADIOLOGY | Facility: HOSPITAL | Age: 79
Discharge: HOME | End: 2024-07-03
Payer: MEDICARE

## 2024-07-03 ENCOUNTER — OFFICE VISIT (OUTPATIENT)
Dept: ORTHOPEDIC SURGERY | Facility: HOSPITAL | Age: 79
End: 2024-07-03
Payer: MEDICARE

## 2024-07-03 DIAGNOSIS — M97.02XD PERIPROSTHETIC FRACTURE AROUND INTERNAL PROSTHETIC LEFT HIP JOINT, SUBSEQUENT ENCOUNTER: Primary | ICD-10-CM

## 2024-07-03 DIAGNOSIS — M16.12 UNILATERAL PRIMARY OSTEOARTHRITIS, LEFT HIP: ICD-10-CM

## 2024-07-03 PROCEDURE — 1123F ACP DISCUSS/DSCN MKR DOCD: CPT | Performed by: STUDENT IN AN ORGANIZED HEALTH CARE EDUCATION/TRAINING PROGRAM

## 2024-07-03 PROCEDURE — 73502 X-RAY EXAM HIP UNI 2-3 VIEWS: CPT | Mod: LT

## 2024-07-03 PROCEDURE — 99214 OFFICE O/P EST MOD 30 MIN: CPT | Performed by: STUDENT IN AN ORGANIZED HEALTH CARE EDUCATION/TRAINING PROGRAM

## 2024-07-03 PROCEDURE — 1159F MED LIST DOCD IN RCRD: CPT | Performed by: STUDENT IN AN ORGANIZED HEALTH CARE EDUCATION/TRAINING PROGRAM

## 2024-07-03 ASSESSMENT — PAIN - FUNCTIONAL ASSESSMENT: PAIN_FUNCTIONAL_ASSESSMENT: NO/DENIES PAIN

## 2024-07-03 NOTE — RESULT ENCOUNTER NOTE
Raúl,  This is marginally better, but not normal. I will need to look again at the visit and I will send you to a kidney specialist if this remains an issue.  Do not hesitate to contact me if you have questions or concerns.    Sincerely,  Neri Irizarry M.D.

## 2024-07-03 NOTE — PROGRESS NOTES
Jazmin Lechgua MD   Adult Reconstruction and Joint Replacement Surgery  Phone: [phone]     Fax: [phone]     Follow-up Hip    Name: Raúl Garcia  : 1945  Date of Visit:  7/3/2024    CC: Follow-up LEFT hip  Date of injury: 24     History of Present Illness:  Raúl Garcia is a 79 y.o. male who presents with several weeks of LEFT hip pain. They last saw me for this problem on 2024.  He has transition to outpatient physical therapy this week.  He has pretty nearly weaned from his walker.  He is not requiring any pain medications.     He reports a recent trip to ED in the interim ([facility]) after syncope while out to dinner, though reports that he didn't fall and did not harm his hip.  He was given new diagnosis of Afib.  He has seen a cardiologist in the meantime and has been started on a Halter monitor.  He endorses maintaining posterior hip precautions and abductor precautions.    Patient has tried the following Ice, Aleve, Activity modification, Physical therapy, (home therapy) and Xray. Patient does occasionally have pain at night. Patient can walk indoors only. Patient is currently using walker as assistive device. Primarily complains of lateral hip pain. Patient has difficulty with donning and doffing shoes and socks, stairs, and getting in/out of car . The pain is significantly impacting his ability to perform activities of daily living. Patient reports no longer able to do activities such as walk without lateral hip pain.     Medications:  Current Outpatient Medications   Medication Instructions    apixaban (ELIQUIS) 5 mg, oral, 2 times daily    atorvastatin (LIPITOR) 20 mg, oral, Daily    famciclovir (FAMVIR) 500 mg, oral, Daily before breakfast    metoprolol succinate XL (TOPROL-XL) 25 mg, oral, Daily, Do not crush or chew.    multivit-iron-minerals-folic acid (Centrum Silver) 0.4 mg-300 mcg- 250 mcg tab 1 tablet, oral, Daily       Allergies:  Allergies as of  07/03/2024 - Reviewed 07/03/2024   Allergen Reaction Noted    Amlodipine Swelling 08/29/2022    Ibuprofen Itching, Other, and Unknown 04/14/2015    Penicillins Rash and Unknown 04/14/2015       Past Medical History:    Past Medical History:   Diagnosis Date    Impacted cerumen, bilateral 12/07/2016    Bilateral hearing loss due to cerumen impaction    Other conditions influencing health status 01/20/2014    Blockage of ear    Personal history of colonic polyps 05/13/2020    History of adenomatous polyp of colon    Personal history of other diseases of the musculoskeletal system and connective tissue     Personal history of arthritis    Personal history of other diseases of the nervous system and sense organs     History of cataract       Past Surgical History:   Past Surgical History:   Procedure Laterality Date    CATARACT EXTRACTION  02/19/2014    Cataract Surgery    OTHER SURGICAL HISTORY  01/20/2014    Leg Repair    OTHER SURGICAL HISTORY  05/08/2019    Hip replacement    TOTAL HIP ARTHROPLASTY  04/12/2017    Hip Replacement       Social History:   Social History     Socioeconomic History    Marital status:      Spouse name: Not on file    Number of children: Not on file    Years of education: Not on file    Highest education level: Not on file   Occupational History    Not on file   Tobacco Use    Smoking status: Never     Passive exposure: Never    Smokeless tobacco: Never   Vaping Use    Vaping status: Never Used   Substance and Sexual Activity    Alcohol use: Yes     Alcohol/week: 14.0 standard drinks of alcohol     Types: 14 Standard drinks or equivalent per week    Drug use: Never    Sexual activity: Not on file   Other Topics Concern    Not on file   Social History Narrative    Not on file     Social Determinants of Health     Financial Resource Strain: Low Risk  (5/8/2024)    Overall Financial Resource Strain (CARDIA)     Difficulty of Paying Living Expenses: Not very hard   Food Insecurity: Not  on file   Transportation Needs: No Transportation Needs (5/8/2024)    PRAPARE - Transportation     Lack of Transportation (Medical): No     Lack of Transportation (Non-Medical): No   Physical Activity: Not on file   Stress: Not on file   Social Connections: Not on file   Intimate Partner Violence: Not on file   Housing Stability: Low Risk  (5/8/2024)    Housing Stability Vital Sign     Unable to Pay for Housing in the Last Year: No     Number of Places Lived in the Last Year: 1     Unstable Housing in the Last Year: No       Family History: No family history on file.    Review of Systems:  Review of systems completed with medical assistant intake. Please refer to this note.     Physical Exam:  BMI: Patient's 23, which is normal.     General: The patient is well appearing, alert and oriented to time, place and person and has an appropriate affect.     Neurological Examination: Sensation normal, motor function normal, coordination / cerebellum normal, reflexes normal.  Does endorse neuropathy of bilateral feet.    Cardiovascular Exam: Capillary refill normal, arterial pulses normal, no varicose veins, no edema.    Skin Exam: Skin thoughout the upper and lower extremities is normal without any evidence of infection or rash.    Gait: patient ambulates with a coxalgic gait.    Left Hip Examination:    Examination of the hip reveals the skin to be intact.    There is mild tenderness over the greater trochanter with palpable step-off at the level of the expected greater trochanteric periprosthetic fracture.  There is no gross mobility at the fracture segment.    There is no obvious swelling.    There is a negative Stinchfield test.    Range of motion is: full extension to 90 degrees of flexion.    The hip internally rotates to 10 degrees and externally rotates to 35 degrees.    Abduction is 30 degrees and adduction is 10 degrees.    There is groin and buttock pain with hip motion.    Right Hip Examination:    Examination  of the hip reveals the skin to be intact.    There is mild tenderness over the greater trochanter.    There is no obvious swelling.    There is a negative Stinchfield test.    Range of motion is: full extension to 90 degrees of flexion.    The hip internally rotates to 15 degrees and externally rotates to 35 degrees.    Abduction is 35 degrees and adduction is 10 degrees.    There is groin and buttock pain with hip motion.    Right Knee Examination:  Examination of the right knee reveals the skin to be intact. There is no obvious swelling.    There is no tenderness to palpation.    Range of motion is full extension to 120 degrees of flexion.    The knee is stable.    There is no grinding with range of motion.    There is no patellofemoral crepitus.    Left Knee Examination:  Examination of the left knee reveals the skin to be intact. There is no obvious swelling.    There is no tenderness to palpation.    Range of motion is full extension to 120 degrees of flexion.    The knee is stable.    There is no grinding with range of motion.    There is no patellofemoral crepitus.    Imaging:  X-rays were personally reviewed today and show implants in good position with no evidence of interval migration of the fracture segment.  There is subtle evidence of callus formation across the fracture site.    Impression and Plan:  79 y.o. male here for follow-up evaluation of LEFT hip.    Periprosthetic fracture around internal prosthetic left hip joint, subsequent encounter    I have discussed the options in detail with the patient. We have discussed anti-inflammatory medication, activity modification, physical therapy.  There is no indication for surgical invention at this time.    Encouraged him to continue with physical therapy.  He is to maintain posterior precautions and abductor precautions.  Patient will continue their home exercise program.Strategies for pain management using over-the-counter anti-inflammatory medications  reviewed.  Encouraged them to maintain range of motion and strength around the hip and knee joints.  They will continue to implement these strategies in addressing their pain.       Recommend the patient continue optimizing nonsurgical treatment interventions as outlined above for management of their joint pain.  He will follow-up as below.  The patient verbalizes understanding with the recommendations and treatment plan as outlined above and is in agreement.  Questions were addressed.    RTC: 1 month    X-rays at next visit: Postop JOSE series     _____________________  Jazmin Lechuga MD   Attending Orthopaedic Surgeon  [facility]    [facility]    This office note was transcribed with dictation software.  Please excuse any typographical errors, program misunderstandings leading to inadvertent insertions or deletions of inappropriate wording, pronoun errors and other unintentional transcription errors not noticed on proof-reading.

## 2024-07-09 ENCOUNTER — HOSPITAL ENCOUNTER (OUTPATIENT)
Dept: CARDIOLOGY | Facility: CLINIC | Age: 79
Discharge: HOME | End: 2024-07-09
Payer: MEDICARE

## 2024-07-09 DIAGNOSIS — R55 SYNCOPE, UNSPECIFIED SYNCOPE TYPE: ICD-10-CM

## 2024-07-09 DIAGNOSIS — R00.1 BRADYCARDIA: ICD-10-CM

## 2024-07-09 DIAGNOSIS — I48.0 PAROXYSMAL ATRIAL FIBRILLATION (MULTI): ICD-10-CM

## 2024-07-09 LAB
AORTIC VALVE MEAN GRADIENT: 9.2 MMHG
AORTIC VALVE PEAK VELOCITY: 2 M/S
AV PEAK GRADIENT: 16 MMHG
AVA (PEAK VEL): 1.63 CM2
AVA (VTI): 1.63 CM2
EJECTION FRACTION APICAL 4 CHAMBER: 72.4
EJECTION FRACTION: 71 %
LEFT ATRIUM VOLUME AREA LENGTH INDEX BSA: 24.2 ML/M2
LEFT VENTRICLE INTERNAL DIMENSION DIASTOLE: 3.57 CM (ref 3.5–6)
LEFT VENTRICULAR OUTFLOW TRACT DIAMETER: 1.9 CM
MITRAL VALVE E/A RATIO: 0.94
MITRAL VALVE E/E' RATIO: 11.61
RIGHT VENTRICLE FREE WALL PEAK S': 9 CM/S
RIGHT VENTRICLE PEAK SYSTOLIC PRESSURE: 21.2 MMHG

## 2024-07-09 PROCEDURE — 93306 TTE W/DOPPLER COMPLETE: CPT | Performed by: INTERNAL MEDICINE

## 2024-07-09 PROCEDURE — 93306 TTE W/DOPPLER COMPLETE: CPT

## 2024-07-25 ENCOUNTER — TREATMENT (OUTPATIENT)
Dept: PHYSICAL THERAPY | Facility: CLINIC | Age: 79
End: 2024-07-25
Payer: MEDICARE

## 2024-07-25 DIAGNOSIS — R29.898 WEAKNESS OF LEFT HIP: ICD-10-CM

## 2024-07-25 DIAGNOSIS — M25.552 PAIN OF LEFT HIP: ICD-10-CM

## 2024-07-25 DIAGNOSIS — R26.89 BALANCE DISORDER: ICD-10-CM

## 2024-07-25 PROCEDURE — 97110 THERAPEUTIC EXERCISES: CPT | Mod: GP

## 2024-07-25 NOTE — PROGRESS NOTES
Physical Therapy  Physical Therapy Treatment Note    Patient Name: Raúl Garcia  MRN: [id number]  Today's Date: 7/25/2024  Time Calculation  Start Time: 1200  Stop Time: 1244  Time Calculation (min): 44 min  PT Therapeutic Procedures Time Entry  Therapeutic Exercise Time Entry: 40        Insurance:  Visit number: 2 of 6 authorized  Authorization info: Auth needed   Insurance Type: Payor: ANTH MEDICARE / Plan: [employer] MEDICARE ADVANTAGE / Product Type: *No Product type* /   Current Problem  1. Weakness of left hip  Follow Up In Physical Therapy      2. Pain of left hip  Follow Up In Physical Therapy      3. Balance disorder  Follow Up In Physical Therapy        General  Reason for Referral: L hip pain and balance disorder  Referred By: Dr. Ankush MD  Precautions  Precautions  Precautions Comment: hx of syncope, A fib  Subjective:     Patient reports that his hip continues to be pain free but that he has still been walking with his walker.   Pain     Objective:   Palpation / Observation   Pt had no increase in discomfort upon palpation of L hip musculature.      Hip ROM   Flexion- R: 100 L: 100 pain free   Abduction- R: 30 L: 30  ER- R: 15 L: 15  IR- R: 10 L: 10     Hip MMT  Flexion- R: 4+/5  L: 4+/5  Abduction- R: 4/5 L:  4/5  Adduction- R:  4+/5 L:  4+/5  Extension- R: 4/5 L: 4/5     Outcome Measures:  Other Measures  Lower Extremity Funtional Score (LEFS): 40/80   Treatments:     THERE EX  5 min bike   Cane walking 3 x 50'  Walking without cane 3 x 50'   Step walking with cane 3 x down/back   Stair climbing with cane 1 x 10 steps   Supine clamshell 3 x 10 (green)   Seated hamstring curl (green) 3 x 10   Seated LAQ (3#) 3 x 10     MANUAL    NMRE    THERE ACT    Assessment:  Pt tolerated session very well. Pt was able to show proper balance and good gait speed when walking without his cane. Pt did well with stair negotiation using cane in opposite hand of the railing. Pt continues to progress towards goals  established in the POC and should continue with skilled therapy.       Plan: Continue to work on walking balance and LE strengthening      Goals:  Active       PT Problem       PT Goals       Start:  07/02/24       1.Pt will increase LEFS with 55/80 or better in order to demo an increase in L hip function  2.  Pt will demo 4+/5 or all hip/knee MMT in order to demo an increase in LE strength   3. Pt will be able to ascend and descend 10 steps with reciprocal gait pattern and proper knee control in order to demo and increase in functional mobility   4. Pt will demo compliance and independence with HEP   5.  Pt will be able to walk for 10 mins with step through gait pattern and non antalgic gait pattern with no AD  6. Pt will increase miniBest-est by a minimum of 3 points in order to demo an increase in functional balance

## 2024-07-30 ENCOUNTER — APPOINTMENT (OUTPATIENT)
Dept: PRIMARY CARE | Facility: CLINIC | Age: 79
End: 2024-07-30
Payer: MEDICARE

## 2024-07-30 ENCOUNTER — LAB (OUTPATIENT)
Dept: LAB | Facility: LAB | Age: 79
End: 2024-07-30
Payer: MEDICARE

## 2024-07-30 VITALS
DIASTOLIC BLOOD PRESSURE: 74 MMHG | WEIGHT: 140 LBS | BODY MASS INDEX: 22.6 KG/M2 | SYSTOLIC BLOOD PRESSURE: 169 MMHG | TEMPERATURE: 98 F | HEART RATE: 68 BPM

## 2024-07-30 DIAGNOSIS — E87.1 HYPONATREMIA: ICD-10-CM

## 2024-07-30 DIAGNOSIS — I10 BENIGN ESSENTIAL HYPERTENSION: Primary | ICD-10-CM

## 2024-07-30 DIAGNOSIS — I10 BENIGN ESSENTIAL HYPERTENSION: ICD-10-CM

## 2024-07-30 LAB
ANION GAP SERPL CALC-SCNC: 14 MMOL/L (ref 10–20)
BUN SERPL-MCNC: 21 MG/DL (ref 6–23)
CALCIUM SERPL-MCNC: 9.3 MG/DL (ref 8.6–10.6)
CHLORIDE SERPL-SCNC: 101 MMOL/L (ref 98–107)
CO2 SERPL-SCNC: 25 MMOL/L (ref 21–32)
CREAT SERPL-MCNC: 1.27 MG/DL (ref 0.5–1.3)
CREAT UR-MCNC: 91.8 MG/DL (ref 20–370)
EGFRCR SERPLBLD CKD-EPI 2021: 57 ML/MIN/1.73M*2
GLUCOSE SERPL-MCNC: 117 MG/DL (ref 74–99)
OSMOLALITY UR: 397 MOSM/KG (ref 200–1200)
POTASSIUM SERPL-SCNC: 4.6 MMOL/L (ref 3.5–5.3)
SODIUM SERPL-SCNC: 135 MMOL/L (ref 136–145)
SODIUM UR-SCNC: 44 MMOL/L
SODIUM/CREAT UR-RTO: 48 MMOL/G CREAT

## 2024-07-30 PROCEDURE — 1036F TOBACCO NON-USER: CPT | Performed by: INTERNAL MEDICINE

## 2024-07-30 PROCEDURE — 1126F AMNT PAIN NOTED NONE PRSNT: CPT | Performed by: INTERNAL MEDICINE

## 2024-07-30 PROCEDURE — 99214 OFFICE O/P EST MOD 30 MIN: CPT | Performed by: INTERNAL MEDICINE

## 2024-07-30 PROCEDURE — 3077F SYST BP >= 140 MM HG: CPT | Performed by: INTERNAL MEDICINE

## 2024-07-30 PROCEDURE — 36415 COLL VENOUS BLD VENIPUNCTURE: CPT

## 2024-07-30 PROCEDURE — 84300 ASSAY OF URINE SODIUM: CPT

## 2024-07-30 PROCEDURE — 83935 ASSAY OF URINE OSMOLALITY: CPT

## 2024-07-30 PROCEDURE — 82570 ASSAY OF URINE CREATININE: CPT

## 2024-07-30 PROCEDURE — 3078F DIAST BP <80 MM HG: CPT | Performed by: INTERNAL MEDICINE

## 2024-07-30 PROCEDURE — 80048 BASIC METABOLIC PNL TOTAL CA: CPT

## 2024-07-30 PROCEDURE — 1123F ACP DISCUSS/DSCN MKR DOCD: CPT | Performed by: INTERNAL MEDICINE

## 2024-07-30 ASSESSMENT — PAIN SCALES - GENERAL: PAINLEVEL: 0-NO PAIN

## 2024-07-30 NOTE — PROGRESS NOTES
Subjective   Patient ID: Raúl Garcia is a 79 y.o. male who presents for Follow-up.  Raúl says he is doing well. No further episodes of syncope.  Raúl has been checking his BP at home, 130s-140s over 60  Echo was normal essentially.  Holter is pending.  He has been using more Gatorade.      Current Outpatient Medications   Medication Instructions    apixaban (ELIQUIS) 5 mg, oral, 2 times daily    atorvastatin (LIPITOR) 20 mg, oral, Daily    famciclovir (FAMVIR) 500 mg, oral, Daily before breakfast    metoprolol succinate XL (TOPROL-XL) 25 mg, oral, Daily, Do not crush or chew.    multivit-iron-minerals-folic acid (Centrum Silver) 0.4 mg-300 mcg- 250 mcg tab 1 tablet, oral, Daily     Review of Systems    Objective   /74 (BP Location: Left arm, Patient Position: Sitting, BP Cuff Size: Adult)   Pulse 68   Temp 36.7 °C (98 °F) (Oral)   Wt 63.5 kg (140 lb)   BMI 22.60 kg/m²   Physical Exam  Lungs CTAP  COR Bigeminal rhythm  No edema.    Assessment/Plan   Problem List Items Addressed This Visit             ICD-10-CM    Benign essential hypertension - Primary I10    Relevant Orders    Basic Metabolic Panel    Hyponatremia E87.1     Order urine sodium and osmolality.  If serum Na still < 132, refer to renal.          UPTODATE PATHWAY    Hyponatremia: Evaluation in adults; Version 6.0  Date Consulted: Tue Jul 30 2024 13:02:40 GMT-0400 (Eastern Daylight Time)    LABORATORY, IMAGING, AND OTHER TESTS:  Order urine sodium and osmolality     Summary:  Hyperglycemia: No.  Recent prostate or uterine surgery: No.  Recent mannitol or IVIG: No.  Suspect pseudohyponatremia: No.  Severe impairment of glomerular filtration rate: No.  Taking a thiazide diuretic: No.  Edema or ascites: No.  Overt hypovolemia: No.  Urine sodium and urine osmolality measured: No.  Measurement of the urine sodium and urine osmolality is appropriate.    Approach selected: Order urine sodium and osmolality.    URL:  [email or web address]  Pathway ID: [email or web address]

## 2024-08-01 ENCOUNTER — TREATMENT (OUTPATIENT)
Dept: PHYSICAL THERAPY | Facility: CLINIC | Age: 79
End: 2024-08-01
Payer: MEDICARE

## 2024-08-01 DIAGNOSIS — R29.898 WEAKNESS OF LEFT HIP: ICD-10-CM

## 2024-08-01 DIAGNOSIS — R26.89 BALANCE DISORDER: ICD-10-CM

## 2024-08-01 DIAGNOSIS — M25.552 PAIN OF LEFT HIP: ICD-10-CM

## 2024-08-01 PROCEDURE — 97110 THERAPEUTIC EXERCISES: CPT | Mod: GP

## 2024-08-01 NOTE — PROGRESS NOTES
"Physical Therapy  Physical Therapy Treatment Note    Patient Name: Raúl Garcia  MRN: [id number]  Today's Date: 8/1/2024  Time Calculation  Start Time: 1315  Stop Time: 1400  Time Calculation (min): 45 min  PT Therapeutic Procedures Time Entry  Therapeutic Exercise Time Entry: 40        Insurance:  Visit number: 3 of 6 authorized  Authorization info: Auth needed   Insurance Type: Payor: ANTH MEDICARE / Plan: [employer] MEDICARE ADVANTAGE / Product Type: *No Product type* /   Current Problem  1. Weakness of left hip  Follow Up In Physical Therapy      2. Pain of left hip  Follow Up In Physical Therapy      3. Balance disorder  Follow Up In Physical Therapy        General  Reason for Referral: L hip pain and balance disorder  Referred By: Dr. Ankush MD  Precautions  Precautions  Precautions Comment: hx of syncope, A fib  Subjective:     Patient has still been using his walker for the majority of the time. Pt still is feeling off balanced when he is using the cane.   Pain     Objective:   Palpation / Observation   Pt had no increase in discomfort upon palpation of L hip musculature.      Hip ROM   Flexion- R: 100 L: 100 pain free   Abduction- R: 30 L: 30  ER- R: 15 L: 15  IR- R: 10 L: 10     Hip MMT  Flexion- R: 4+/5  L: 4+/5  Abduction- R: 4/5 L:  4/5  Adduction- R:  4+/5 L:  4+/5  Extension- R: 4/5 L: 4/5     Outcome Measures:  Other Measures  Lower Extremity Funtional Score (LEFS): 40/80   Treatments:     THERE EX  5 min bike   Walking weaving through cones 4 x down/back with cane   Husam step overs 4 x down/back with cane   Foam mat walking 4 x down/back with cane   6\" step up 2 x 10   MANUAL    NMRE    THERE ACT    Assessment:  Pt tolerated session very well. Pt was able to perform multiple walking balance exercises with minimal guarding. Pt was able to continue to progress LE strengthening without any increase in discomfort. Pt continues to progress towards goals established in the POC and should continue " with skilled therapy.         Plan: Continue to work on walking balance and LE strengthening      Goals:  Active       PT Problem       PT Goals       Start:  07/02/24       1.Pt will increase LEFS with 55/80 or better in order to demo an increase in L hip function  2.  Pt will demo 4+/5 or all hip/knee MMT in order to demo an increase in LE strength   3. Pt will be able to ascend and descend 10 steps with reciprocal gait pattern and proper knee control in order to demo and increase in functional mobility   4. Pt will demo compliance and independence with HEP   5.  Pt will be able to walk for 10 mins with step through gait pattern and non antalgic gait pattern with no AD  6. Pt will increase miniBest-est by a minimum of 3 points in order to demo an increase in functional balance

## 2024-08-07 ENCOUNTER — OFFICE VISIT (OUTPATIENT)
Dept: ORTHOPEDIC SURGERY | Facility: HOSPITAL | Age: 79
End: 2024-08-07
Payer: MEDICARE

## 2024-08-07 ENCOUNTER — HOSPITAL ENCOUNTER (OUTPATIENT)
Dept: RADIOLOGY | Facility: HOSPITAL | Age: 79
Discharge: HOME | End: 2024-08-07
Payer: MEDICARE

## 2024-08-07 DIAGNOSIS — M97.02XD PERIPROSTHETIC FRACTURE AROUND INTERNAL PROSTHETIC LEFT HIP JOINT, SUBSEQUENT ENCOUNTER: Primary | ICD-10-CM

## 2024-08-07 DIAGNOSIS — M16.12 UNILATERAL PRIMARY OSTEOARTHRITIS, LEFT HIP: ICD-10-CM

## 2024-08-07 PROCEDURE — 73502 X-RAY EXAM HIP UNI 2-3 VIEWS: CPT | Mod: LT

## 2024-08-07 PROCEDURE — 1159F MED LIST DOCD IN RCRD: CPT | Performed by: STUDENT IN AN ORGANIZED HEALTH CARE EDUCATION/TRAINING PROGRAM

## 2024-08-07 PROCEDURE — 1123F ACP DISCUSS/DSCN MKR DOCD: CPT | Performed by: STUDENT IN AN ORGANIZED HEALTH CARE EDUCATION/TRAINING PROGRAM

## 2024-08-07 PROCEDURE — 99213 OFFICE O/P EST LOW 20 MIN: CPT | Performed by: STUDENT IN AN ORGANIZED HEALTH CARE EDUCATION/TRAINING PROGRAM

## 2024-08-07 PROCEDURE — 73502 X-RAY EXAM HIP UNI 2-3 VIEWS: CPT | Mod: LEFT SIDE | Performed by: RADIOLOGY

## 2024-08-07 ASSESSMENT — PAIN - FUNCTIONAL ASSESSMENT: PAIN_FUNCTIONAL_ASSESSMENT: NO/DENIES PAIN

## 2024-08-07 NOTE — PROGRESS NOTES
Jazmin Lechuga MD   Adult Reconstruction and Joint Replacement Surgery  Phone: [phone]     Fax: [phone]       Name: Raúl Garcia  : 1945 (Age: 79 y.o.)  Date of Visit: 2024    Follow-up Hip    CC: Follow-up LEFT hip    History of Present Illness:  This patient presents with several months of LEFT hip pain. They last saw me for this problem on 7/3/24. He has 2 outpatient PT visits left. Feels he has dramatically improved with PT. Gait is more stable. Hardly using cane at home. Using walker in the community. At baseline, he does not use any assistive devices. No longer having pain. Not taking pain medications.     Patient has tried the following .Patient is able to walk indoors only. Patient is currently using nothing, cane, and walker as assistive device.  The patient has no pain. Patient has difficulty with walking, which is improving with physical therapy.     HISTORY  PROMs   No questionnaires on file.     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 impaction    Other conditions influencing health status 2014    Blockage of ear    Personal history of colonic polyps 2020    History of adenomatous polyp of colon    Personal history of other diseases of the musculoskeletal system and connective tissue     Personal history of arthritis    Personal history of other diseases of the nervous system and sense organs     History of cataract       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 impaction    Other conditions influencing health status 2014    Blockage of ear    Personal history of colonic polyps 2020    History of adenomatous polyp of colon    Personal history of other diseases of the musculoskeletal system and connective tissue     Personal history of arthritis    Personal history of other diseases of the  nervous system and sense organs     History of cataract     Documented in chart and reviewed.     Past Surgical History:   Procedure Laterality Date    CATARACT EXTRACTION  02/19/2014    Cataract Surgery    OTHER SURGICAL HISTORY  01/20/2014    Leg Repair    OTHER SURGICAL HISTORY  05/08/2019    Hip replacement    TOTAL HIP ARTHROPLASTY  04/12/2017    Hip Replacement       Allergies: He is allergic to amlodipine, ibuprofen, and penicillins.     Medications:  Current Outpatient Medications   Medication Instructions    apixaban (ELIQUIS) 5 mg, oral, 2 times daily    atorvastatin (LIPITOR) 20 mg, oral, Daily    famciclovir (FAMVIR) 500 mg, oral, Daily before breakfast    metoprolol succinate XL (TOPROL-XL) 25 mg, oral, Daily, Do not crush or chew.    multivit-iron-minerals-folic acid (Centrum Silver) 0.4 mg-300 mcg- 250 mcg tab 1 tablet, oral, Daily       No family history on file.  Documented in chart and reviewed.     Social History     Tobacco Use    Smoking status: Never     Passive exposure: Never    Smokeless tobacco: Never   Substance Use Topics    Alcohol use: Yes     Alcohol/week: 14.0 standard drinks of alcohol     Types: 14 Standard drinks or equivalent per week     Comment: Occasionally        Review of Systems:  Review of systems completed with medical assistant intake. Please refer to this note.     Physical Exam:  BMI: 22.6    General: The patient is well appearing, alert and oriented to time, place and person and has an appropriate affect.     Neurological Examination: Sensation normal, motor function normal, coordination / cerebellum normal, reflexes normal.    Cardiovascular Exam: Capillary refill normal, arterial pulses normal, no varicose veins, no edema.    Skin Exam: Skin thoughout the upper and lower extremities is normal without any evidence of infection or rash.    Gait: patient ambulates with a mildly coxalgic gait.    Left Hip Examination:    Examination of the hip reveals the skin to be  intact.    There is no significant tenderness over the greater trochanter.    There is no obvious swelling.    There is a negative Stinchfield test.    Range of motion is: full extension to 90 degrees of flexion.    The hip internally rotates to 10 degrees and externally rotates to 35 degrees.    Abduction is 35 degrees and adduction is 10 degrees.    There is groin and buttock pain with hip motion.    Right Hip Examination:    Examination of the hip reveals the skin to be intact.    There is no significant tenderness over the greater trochanter.    There is no obvious swelling.    There is a negative Stinchfield test.    Range of motion is: full extension to 90 degrees of flexion.    The hip internally rotates to 15 degrees and externally rotates to 40 degrees.    Abduction is 35 degrees and adduction is 10 degrees.    There is groin and buttock pain with hip motion.    Right Knee Examination:  Examination of the right knee reveals the skin to be intact. There is no obvious swelling.    There is no tenderness to palpation.    Range of motion is full extension to 120 degrees of flexion.    The knee is stable.    There is no grinding with range of motion.    There is no patellofemoral crepitus.    Left Knee Examination:  Examination of the left knee reveals the skin to be intact. There is no obvious swelling.    There is no tenderness to palpation.    Range of motion is full extension to 120 degrees of flexion.    The knee is stable.    There is no grinding with range of motion.    There is no patellofemoral crepitus.    Imaging:  X-rays were personally reviewed today and show implants in good position with no evidence of complication.  There is interval callus formation around the greater trochanteric fracture segment.      Impression and Plan:  79 y.o. male  here for follow-up evaluation of LEFT hip.    DIAGNOSIS:   Periprosthetic fracture around internal prosthetic left hip joint, subsequent encounter     I have  discussed the options in detail with the patient. We have discussed anti-inflammatory medication, activity modification, physical therapy, corticosteroid injections, and total hip replacement surgery.  The patient is responding very well to nonsurgical management of greater trochanteric periprosthetic fracture.    Encouraged the patient continue physical therapy.  Patient will continue their home exercise program.Strategies for pain management using over-the-counter anti-inflammatory medications reviewed.  His pain has near completely resolved.  Encouraged him to begin weaning from his walker to cane and ideally back to his baseline which involves no assistive devices.  He can do this with help of physical therapy.  Encouraged them to maintain range of motion and strength around the hip and knee joints.  They will continue to implement these strategies in addressing their pain.       Recommend the patient continue optimizing nonsurgical treatment interventions as outlined above for management of their periprosthetic fracture.  The patient is in agreement with the plan as outlined above.  He will return sooner if any new concerns.    RTC: 3 months from today     X-rays at next visit: Postop JOSE series     _____________________  Jazmin Lechuga MD   Attending Orthopaedic Surgeon  [facility]    [facility]    This office note was transcribed with dictation software.  Please excuse any typographical errors, program misunderstandings leading to inadvertent insertions or deletions of inappropriate wording, pronoun errors and other unintentional transcription errors not noticed on proof-reading.

## 2024-08-08 ENCOUNTER — TREATMENT (OUTPATIENT)
Dept: PHYSICAL THERAPY | Facility: CLINIC | Age: 79
End: 2024-08-08
Payer: MEDICARE

## 2024-08-08 DIAGNOSIS — M25.552 PAIN OF LEFT HIP: ICD-10-CM

## 2024-08-08 DIAGNOSIS — R26.89 BALANCE DISORDER: ICD-10-CM

## 2024-08-08 DIAGNOSIS — R29.898 WEAKNESS OF LEFT HIP: ICD-10-CM

## 2024-08-08 PROCEDURE — 97110 THERAPEUTIC EXERCISES: CPT | Mod: GP

## 2024-08-08 NOTE — PROGRESS NOTES
Physical Therapy  Physical Therapy Treatment Note    Patient Name: Raúl Garcia  MRN: [id number]  Today's Date: 8/8/2024  Time Calculation  Start Time: 1345  Stop Time: 1430  Time Calculation (min): 45 min  PT Therapeutic Procedures Time Entry  Therapeutic Exercise Time Entry: 41        Insurance:  Visit number: 4 of 6 authorized  Authorization info: Auth needed   Insurance Type: Payor: ANTH MEDICARE / Plan: [employer] MEDICARE ADVANTAGE / Product Type: *No Product type* /   Current Problem  1. Weakness of left hip  Follow Up In Physical Therapy      2. Pain of left hip  Follow Up In Physical Therapy      3. Balance disorder  Follow Up In Physical Therapy        General  Reason for Referral: L hip pain and balance disorder  Referred By: Dr. Ankush MD  Precautions  Precautions  Precautions Comment: hx of syncope, A fib  Subjective:     Patient is still having some confusion with how to ambulate with his cane on his stairs. Still walking with his walker in the community.   Pain     Objective:   Palpation / Observation   Pt had no increase in discomfort upon palpation of L hip musculature.      Hip ROM   Flexion- R: 100 L: 100 pain free   Abduction- R: 30 L: 30  ER- R: 15 L: 15  IR- R: 10 L: 10     Hip MMT  Flexion- R: 4+/5  L: 4+/5  Abduction- R: 4/5 L:  4/5  Adduction- R:  4+/5 L:  4+/5  Extension- R: 4/5 L: 4/5     Outcome Measures:  Other Measures  Lower Extremity Funtional Score (LEFS): 40/80   Treatments:     THERE EX  5 min bike   Pt education on stair negotiation with cane (handout given)   Stair climbing with cane and verbal cueing 2 x 10   Seated hip flexion 2 x 10 (red)  Seated clamshell 2 x 10 (green)  MANUAL    NMRE    THERE ACT    Assessment:  Pt tolerated session very well. Pt did better with stairs after having instruction with printed handout. Pt was educated on the possibility on having a grab bar at the bottom of his steps where the railing ends. Pt continues to progress towards goals established  in the POC and should continue with skilled therapy.         Plan: Continue to work on walking balance and LE strengthening      Goals:  Active       PT Problem       PT Goals       Start:  07/02/24       1.Pt will increase LEFS with 55/80 or better in order to demo an increase in L hip function  2.  Pt will demo 4+/5 or all hip/knee MMT in order to demo an increase in LE strength   3. Pt will be able to ascend and descend 10 steps with reciprocal gait pattern and proper knee control in order to demo and increase in functional mobility   4. Pt will demo compliance and independence with HEP   5.  Pt will be able to walk for 10 mins with step through gait pattern and non antalgic gait pattern with no AD  6. Pt will increase miniBest-est by a minimum of 3 points in order to demo an increase in functional balance

## 2024-08-14 ENCOUNTER — APPOINTMENT (OUTPATIENT)
Dept: PRIMARY CARE | Facility: CLINIC | Age: 79
End: 2024-08-14
Payer: MEDICARE

## 2024-08-15 ENCOUNTER — TREATMENT (OUTPATIENT)
Dept: PHYSICAL THERAPY | Facility: CLINIC | Age: 79
End: 2024-08-15
Payer: MEDICARE

## 2024-08-15 DIAGNOSIS — R29.898 WEAKNESS OF LEFT HIP: ICD-10-CM

## 2024-08-15 DIAGNOSIS — R26.89 BALANCE DISORDER: ICD-10-CM

## 2024-08-15 DIAGNOSIS — M25.552 PAIN OF LEFT HIP: ICD-10-CM

## 2024-08-15 PROCEDURE — 97110 THERAPEUTIC EXERCISES: CPT | Mod: GP

## 2024-08-15 NOTE — PROGRESS NOTES
"Physical Therapy  Physical Therapy Treatment Note    Patient Name: Raúl Garcia  MRN: [id number]  Today's Date: 8/15/2024  Time Calculation  Start Time: 1315  Stop Time: 1400  Time Calculation (min): 45 min  PT Therapeutic Procedures Time Entry  Therapeutic Exercise Time Entry: 41        Insurance:  Visit number: 5 of 6 authorized  Authorization info: Auth needed   Insurance Type: Payor: ANTH MEDICARE / Plan: [employer] MEDICARE ADVANTAGE / Product Type: *No Product type* /   Current Problem  1. Weakness of left hip  Follow Up In Physical Therapy      2. Pain of left hip  Follow Up In Physical Therapy      3. Balance disorder  Follow Up In Physical Therapy        General  Reason for Referral: L hip pain and balance disorder  Referred By: Dr. Ankush MD  Precautions  Precautions  Precautions Comment: hx of syncope, A fib  Subjective:     Patient feels like the education and hand out has helped a lot with stairs. Pt is hoping to strictly use the cane soon.   Pain     Objective:   Palpation / Observation   Pt had no increase in discomfort upon palpation of L hip musculature.      Hip ROM   Flexion- R: 100 L: 100 pain free   Abduction- R: 30 L: 30  ER- R: 15 L: 15  IR- R: 10 L: 10     Hip MMT  Flexion- R: 4+/5  L: 4+/5  Abduction- R: 4/5 L:  4/5  Adduction- R:  4+/5 L:  4+/5  Extension- R: 4/5 L: 4/5     Outcome Measures:  Other Measures  Lower Extremity Funtional Score (LEFS): 40/80   Treatments:     THERE EX  5 min bike   160' walk with walker x 1   160' walk with cane x 1   BL shuttle press 3 x 15 (25,31#)  6\" step up 2 x 10       MANUAL    NMRE    THERE ACT    Assessment:  Pt tolerated session very well. Pt was able to show that he had around the same gait speed and balance when walking with his walker or cane. Pt continues to progress towards goals established in the POC and should continue with skilled therapy.           Plan: Continue to work on walking balance and LE strengthening      Goals:  Active       PT " Problem       PT Goals       Start:  07/02/24       1.Pt will increase LEFS with 55/80 or better in order to demo an increase in L hip function  2.  Pt will demo 4+/5 or all hip/knee MMT in order to demo an increase in LE strength   3. Pt will be able to ascend and descend 10 steps with reciprocal gait pattern and proper knee control in order to demo and increase in functional mobility   4. Pt will demo compliance and independence with HEP   5.  Pt will be able to walk for 10 mins with step through gait pattern and non antalgic gait pattern with no AD  6. Pt will increase miniBest-est by a minimum of 3 points in order to demo an increase in functional balance

## 2024-08-21 ENCOUNTER — APPOINTMENT (OUTPATIENT)
Dept: PRIMARY CARE | Facility: CLINIC | Age: 79
End: 2024-08-21
Payer: MEDICARE

## 2024-08-26 ENCOUNTER — TREATMENT (OUTPATIENT)
Dept: PHYSICAL THERAPY | Facility: CLINIC | Age: 79
End: 2024-08-26
Payer: MEDICARE

## 2024-08-26 DIAGNOSIS — R26.89 BALANCE DISORDER: ICD-10-CM

## 2024-08-26 DIAGNOSIS — M25.552 PAIN OF LEFT HIP: ICD-10-CM

## 2024-08-26 DIAGNOSIS — R29.898 WEAKNESS OF LEFT HIP: Primary | ICD-10-CM

## 2024-08-26 PROCEDURE — 97110 THERAPEUTIC EXERCISES: CPT | Mod: GP

## 2024-08-26 NOTE — PROGRESS NOTES
Physical Therapy  Physical Therapy Treatment Note    Patient Name: Raúl Garcia  MRN: [id number]  Today's Date: 8/26/2024  Time Calculation  Start Time: 1135  Stop Time: 1220  Time Calculation (min): 45 min  PT Therapeutic Procedures Time Entry  Therapeutic Exercise Time Entry: 40        Insurance:  Visit number: 6 of 6 authorized  Authorization info: Auth needed   Insurance Type: Payor: ANTH MEDICARE / Plan: [employer] MEDICARE ADVANTAGE / Product Type: *No Product type* /   Current Problem  1. Weakness of left hip  Follow Up In Physical Therapy      2. Pain of left hip  Follow Up In Physical Therapy      3. Balance disorder  Follow Up In Physical Therapy        General  Reason for Referral: L hip pain and balance disorder  Referred By: Dr. Ankush MD  Precautions  Precautions  Precautions Comment: hx of syncope, A fib  Subjective:     Patient has been walking exclusively with a cane since last visit. Pt is hoping to continue with therapy due to the progress he has been making   Pain     Objective:   Palpation / Observation   Pt had no increase in discomfort upon palpation of L hip musculature.      Hip ROM   Flexion- R: 100 L: 100 pain free   Abduction- R: 30 L: 30  ER- R: 15 L: 15  IR- R: 10 L: 10     Hip MMT  Flexion- R: 4+/5  L: 4+/5  Abduction- R: 4+/5 L:  4+/5  Adduction- R:  4+/5 L:  4+/5  Extension- R: 4/5 L: 4/5     Outcome Measures:  Other Measures  Lower Extremity Funtional Score (LEFS): 55/80   Treatments:     THERE EX  5 min bike   Hip MMT  Outcome measure education   BL shuttle press 3 x 10 (25#)   Supine clamshell 3 x 10   Supine adduction 3 x 10       MANUAL    NMRE    THERE ACT    Assessment:  Pt tolerated session very well. Pt was able to show that he is making progress via improved outcome score, strength, and overall function. Pt would benefit from continued therapy in order to continue to work towards accomplishing all goals.            Plan: Continue to work on walking balance and LE  strengthening      Goals:  Active       PT Problem       PT Goals (Progressing)       Start:  07/02/24       1.Pt will increase LEFS with 55/80 or better in order to demo an increase in L hip function  2.  Pt will demo 4+/5 or all hip/knee MMT in order to demo an increase in LE strength   3. Pt will be able to ascend and descend 10 steps with reciprocal gait pattern and proper knee control in order to demo and increase in functional mobility   4. Pt will demo compliance and independence with HEP   5.  Pt will be able to walk for 10 mins with step through gait pattern and non antalgic gait pattern with no AD  6. Pt will increase miniBest-est by a minimum of 3 points in order to demo an increase in functional balance

## 2024-08-28 ENCOUNTER — APPOINTMENT (OUTPATIENT)
Dept: PRIMARY CARE | Facility: CLINIC | Age: 79
End: 2024-08-28
Payer: MEDICARE

## 2024-08-28 ENCOUNTER — LAB (OUTPATIENT)
Dept: LAB | Facility: LAB | Age: 79
End: 2024-08-28
Payer: MEDICARE

## 2024-08-28 VITALS
DIASTOLIC BLOOD PRESSURE: 66 MMHG | WEIGHT: 145 LBS | TEMPERATURE: 98.2 F | BODY MASS INDEX: 23.4 KG/M2 | SYSTOLIC BLOOD PRESSURE: 110 MMHG | HEART RATE: 70 BPM

## 2024-08-28 DIAGNOSIS — I35.8 AORTIC VALVE SCLEROSIS: ICD-10-CM

## 2024-08-28 DIAGNOSIS — E87.1 HYPONATREMIA: ICD-10-CM

## 2024-08-28 DIAGNOSIS — I10 BENIGN ESSENTIAL HYPERTENSION: Primary | ICD-10-CM

## 2024-08-28 LAB
ANION GAP SERPL CALC-SCNC: 15 MMOL/L (ref 10–20)
BUN SERPL-MCNC: 18 MG/DL (ref 6–23)
CALCIUM SERPL-MCNC: 9.1 MG/DL (ref 8.6–10.6)
CHLORIDE SERPL-SCNC: 99 MMOL/L (ref 98–107)
CO2 SERPL-SCNC: 25 MMOL/L (ref 21–32)
CREAT SERPL-MCNC: 1.34 MG/DL (ref 0.5–1.3)
EGFRCR SERPLBLD CKD-EPI 2021: 54 ML/MIN/1.73M*2
GLUCOSE SERPL-MCNC: 94 MG/DL (ref 74–99)
POTASSIUM SERPL-SCNC: 4.6 MMOL/L (ref 3.5–5.3)
SODIUM SERPL-SCNC: 134 MMOL/L (ref 136–145)

## 2024-08-28 PROCEDURE — 99214 OFFICE O/P EST MOD 30 MIN: CPT | Performed by: INTERNAL MEDICINE

## 2024-08-28 PROCEDURE — 3074F SYST BP LT 130 MM HG: CPT | Performed by: INTERNAL MEDICINE

## 2024-08-28 PROCEDURE — 36415 COLL VENOUS BLD VENIPUNCTURE: CPT

## 2024-08-28 PROCEDURE — 1126F AMNT PAIN NOTED NONE PRSNT: CPT | Performed by: INTERNAL MEDICINE

## 2024-08-28 PROCEDURE — 1036F TOBACCO NON-USER: CPT | Performed by: INTERNAL MEDICINE

## 2024-08-28 PROCEDURE — 3078F DIAST BP <80 MM HG: CPT | Performed by: INTERNAL MEDICINE

## 2024-08-28 PROCEDURE — 80048 BASIC METABOLIC PNL TOTAL CA: CPT

## 2024-08-28 PROCEDURE — 1123F ACP DISCUSS/DSCN MKR DOCD: CPT | Performed by: INTERNAL MEDICINE

## 2024-08-28 ASSESSMENT — PAIN SCALES - GENERAL: PAINLEVEL: 0-NO PAIN

## 2024-08-28 NOTE — PROGRESS NOTES
Subjective   Patient ID: Raúl Garcia is a 79 y.o. male who presents for Follow-up.  Raúl is in for follow up. He has been doing well. His Holter monitor showed no atrial fibrillation. Other arrhythmias were noted.  He is not always using an assistive device. There have been no additional falls or syncopal episodes      Current Outpatient Medications   Medication Instructions    apixaban (ELIQUIS) 5 mg, oral, 2 times daily    atorvastatin (LIPITOR) 20 mg, oral, Daily    famciclovir (FAMVIR) 500 mg, oral, Daily before breakfast    metoprolol succinate XL (TOPROL-XL) 25 mg, oral, Daily, Do not crush or chew.    multivit-iron-minerals-folic acid (Centrum Silver) 0.4 mg-300 mcg- 250 mcg tab 1 tablet, oral, Daily     Review of Systems   All other systems reviewed and are negative.      Objective   /70 (BP Location: Left arm, Patient Position: Sitting, BP Cuff Size: Adult)   Pulse 70   Temp 36.8 °C (98.2 °F) (Oral)   Wt 65.8 kg (145 lb)   BMI 23.40 kg/m²   Physical Exam  Appears well.  Steady gait with walker  Lungs CTAP  COR regular rhythm with premature beats.    Assessment/Plan   Assessment & Plan  Benign essential hypertension  Well controlled. Continue Current Management         Aortic valve sclerosis  Asx. See echo report       Hyponatremia  Improved at last visit. Reassess  Orders:    Basic Metabolic Panel; Future    F/U with cardiology. Ask about anticoagulation.

## 2024-08-30 NOTE — RESULT ENCOUNTER NOTE
Raúl,  I'm still OK with the sodium, but it is slightly below normal.  Do not hesitate to contact me if you have questions or concerns.    Sincerely,  Neri Irizarry M.D.

## 2024-09-10 ENCOUNTER — TREATMENT (OUTPATIENT)
Dept: PHYSICAL THERAPY | Facility: CLINIC | Age: 79
End: 2024-09-10
Payer: MEDICARE

## 2024-09-10 DIAGNOSIS — M25.552 PAIN OF LEFT HIP: ICD-10-CM

## 2024-09-10 DIAGNOSIS — R26.89 BALANCE DISORDER: ICD-10-CM

## 2024-09-10 DIAGNOSIS — R29.898 WEAKNESS OF LEFT HIP: ICD-10-CM

## 2024-09-10 PROCEDURE — 97110 THERAPEUTIC EXERCISES: CPT | Mod: GP

## 2024-09-10 NOTE — PROGRESS NOTES
"Physical Therapy  Physical Therapy Treatment Note    Patient Name: Raúl Garcia  MRN: [id number]  Today's Date: 9/10/2024  Time Calculation  Start Time: 1730  Stop Time: 1815  Time Calculation (min): 45 min  PT Therapeutic Procedures Time Entry  Therapeutic Exercise Time Entry: 39        Insurance:  Visit number: 1 of 3 authorized  Authorization info: Auth needed   Insurance Type: Payor: ANTH MEDICARE / Plan: [employer] MEDICARE ADVANTAGE / Product Type: *No Product type* /   Current Problem  1. Weakness of left hip  Follow Up In Physical Therapy      2. Pain of left hip  Follow Up In Physical Therapy      3. Balance disorder  Follow Up In Physical Therapy        General  Reason for Referral: L hip pain and balance disorder  Referred By: Dr. Ankush MD  Precautions  Precautions  Precautions Comment: hx of syncope, A fib  Subjective:     Patient has been able to continue to walk solely with his cane. He was also able to go up to the top of his steps multiple times without any problems   Pain     Objective:   Palpation / Observation   Pt had no increase in discomfort upon palpation of L hip musculature.      Hip ROM   Flexion- R: 100 L: 100 pain free   Abduction- R: 30 L: 30  ER- R: 15 L: 15  IR- R: 10 L: 10     Hip MMT  Flexion- R: 4+/5  L: 4+/5  Abduction- R: 4+/5 L:  4+/5  Adduction- R:  4+/5 L:  4+/5  Extension- R: 4/5 L: 4/5     Outcome Measures:  Other Measures  Lower Extremity Funtional Score (LEFS): 55/80   Treatments:     THERE EX  5 min bike   Supine clamshell 3 x 10 (blue)   BL shuttle press 3 x 10 (25,31#)   6\" step up 3 x 10   Foam marching 2 x 10         MANUAL    NMRE    THERE ACT    Assessment:  Pt tolerated session very well. Pt was able to continue to progress LE strengthening without any increase in hip pain. Pt's balance has visibly improved as well. Pt continues to progress towards goals established in the POC and should continue with skilled therapy.              Plan: Continue to work on " walking balance and LE strengthening      Goals:  Active       PT Problem       PT Goals (Progressing)       Start:  07/02/24       1.Pt will increase LEFS with 55/80 or better in order to demo an increase in L hip function  2.  Pt will demo 4+/5 or all hip/knee MMT in order to demo an increase in LE strength   3. Pt will be able to ascend and descend 10 steps with reciprocal gait pattern and proper knee control in order to demo and increase in functional mobility   4. Pt will demo compliance and independence with HEP   5.  Pt will be able to walk for 10 mins with step through gait pattern and non antalgic gait pattern with no AD  6. Pt will increase miniBest-est by a minimum of 3 points in order to demo an increase in functional balance

## 2024-09-17 ENCOUNTER — TREATMENT (OUTPATIENT)
Dept: PHYSICAL THERAPY | Facility: CLINIC | Age: 79
End: 2024-09-17
Payer: MEDICARE

## 2024-09-17 DIAGNOSIS — R26.89 BALANCE DISORDER: ICD-10-CM

## 2024-09-17 DIAGNOSIS — M25.552 PAIN OF LEFT HIP: ICD-10-CM

## 2024-09-17 DIAGNOSIS — R29.898 WEAKNESS OF LEFT HIP: ICD-10-CM

## 2024-09-17 PROCEDURE — 97110 THERAPEUTIC EXERCISES: CPT | Mod: GP

## 2024-09-17 NOTE — PROGRESS NOTES
"Physical Therapy  Physical Therapy Treatment Note    Patient Name: Raúl Garcia  MRN: [id number]  Today's Date: 9/17/2024  Time Calculation  Start Time: 1730  Stop Time: 1815  Time Calculation (min): 45 min  PT Therapeutic Procedures Time Entry  Therapeutic Exercise Time Entry: 40        Insurance:  Visit number: 2 of 3 authorized  Authorization info: Auth needed   Insurance Type: Payor: ANTH MEDICARE / Plan: [employer] MEDICARE ADVANTAGE / Product Type: *No Product type* /   Current Problem  1. Weakness of left hip  Follow Up In Physical Therapy      2. Pain of left hip  Follow Up In Physical Therapy      3. Balance disorder  Follow Up In Physical Therapy        General  Reason for Referral: L hip pain and balance disorder  Referred By: Dr. Ankush MD  Precautions  Precautions  Precautions Comment: hx of syncope, A fib  Subjective:     Patient continues to feel like he is making good progress   Pain     Objective:   Palpation / Observation   Pt had no increase in discomfort upon palpation of L hip musculature.      Hip ROM   Flexion- R: 100 L: 100 pain free   Abduction- R: 30 L: 30  ER- R: 15 L: 15  IR- R: 10 L: 10     Hip MMT  Flexion- R: 4+/5  L: 4+/5  Abduction- R: 4+/5 L:  4+/5  Adduction- R:  4+/5 L:  4+/5  Extension- R: 4/5 L: 4/5     Outcome Measures:  Other Measures  Lower Extremity Funtional Score (LEFS): 55/80   Treatments:     THERE EX  5 min bike   Supine clamshell 3 x 10 (blue)   Supine marches 2 x 10   BL shuttle press 3 x 10 (25,31#)   4\" step up 3 x 10           MANUAL    NMRE    THERE ACT    Assessment:  Pt tolerated session very well. Pt was able to continue to progress LE strengthening without any increase in hip pain. Pt only had some mild hip discomfort when coming into end range hip flexion. Pt continues to progress towards goals established in the POC and should continue with skilled therapy.               Plan: Continue to work on walking balance and LE strengthening      Goals:  Active  "      PT Problem       PT Goals (Progressing)       Start:  07/02/24       1.Pt will increase LEFS with 55/80 or better in order to demo an increase in L hip function  2.  Pt will demo 4+/5 or all hip/knee MMT in order to demo an increase in LE strength   3. Pt will be able to ascend and descend 10 steps with reciprocal gait pattern and proper knee control in order to demo and increase in functional mobility   4. Pt will demo compliance and independence with HEP   5.  Pt will be able to walk for 10 mins with step through gait pattern and non antalgic gait pattern with no AD  6. Pt will increase miniBest-est by a minimum of 3 points in order to demo an increase in functional balance

## 2024-09-24 ENCOUNTER — TREATMENT (OUTPATIENT)
Dept: PHYSICAL THERAPY | Facility: CLINIC | Age: 79
End: 2024-09-24
Payer: MEDICARE

## 2024-09-24 DIAGNOSIS — R29.898 WEAKNESS OF LEFT HIP: ICD-10-CM

## 2024-09-24 DIAGNOSIS — M25.552 PAIN OF LEFT HIP: ICD-10-CM

## 2024-09-24 DIAGNOSIS — R26.89 BALANCE DISORDER: ICD-10-CM

## 2024-09-24 PROCEDURE — 97110 THERAPEUTIC EXERCISES: CPT | Mod: GP

## 2024-09-24 NOTE — PROGRESS NOTES
"Physical Therapy  Physical Therapy Treatment Note    Patient Name: Raúl Garcia  MRN: [id number]  Today's Date: 9/24/2024  Time Calculation  Start Time: 1440  Stop Time: 1525  Time Calculation (min): 45 min  PT Therapeutic Procedures Time Entry  Therapeutic Exercise Time Entry: 40        Insurance:  Visit number: 3 of 3 authorized  Authorization info: Auth needed   Insurance Type: Payor: [employer] / Plan: [employer] MEDICARE ADVANTAGE / Product Type: *No Product type* /   Current Problem  1. Weakness of left hip  Follow Up In Physical Therapy      2. Pain of left hip  Follow Up In Physical Therapy      3. Balance disorder  Follow Up In Physical Therapy        General  Reason for Referral: L hip pain and balance disorder  Referred By: Dr. Ankush MD  Precautions  Precautions  Precautions Comment: hx of syncope, A fib  Subjective:     Patient feels like he continues to make progress but thinks he still has room for improvement   Pain     Objective:   Palpation / Observation   Pt had no increase in discomfort upon palpation of L hip musculature.      Hip ROM   Flexion- R: 100 L: 100 pain free   Abduction- R: 30 L: 30  ER- R: 15 L: 15  IR- R: 10 L: 10     Hip MMT  Flexion- R: 4+/5  L: 4+/5  Abduction- R: 4+/5 L:  4+/5  Adduction- R:  4+/5 L:  4+/5  Extension- R: 4/5 L: 4/5     Outcome Measures:  Other Measures  Lower Extremity Funtional Score (LEFS): 55/80   Treatments:     THERE EX  5 min bike   Outcome measure education   LE MMT 1 x 1   Supine marches 2 x 10 (discontinued due to pain)   Supine isometric clamshell 2 x 10   Supine adduction 2 x 10   6\" step up 2 x 10       MANUAL    NMRE    THERE ACT    Assessment:  Pt tolerated session very well. Pt continued to show progress via improvement in outcome sore, strength, and an overall improvement in functional mobility. Pt would benefit from continued therapy in order to continue to work on stamina and improved strength for functional tasks.     Discharge " Summary      Discharge Summary: PT    Discharge Information: Date of discharge 3/24/25, Date of last visit 9/24/25, Date of evaluation 7/2/24, Number of attended visits 9, Referred by Dr. Lechuga, and Referred for L hip pain due to fall    Therapy Summary: Achieved most goals in POC     Discharge Status: Pt was back to his PLOF      Rehab Discharge Reason: Achieved all and/or the most significant goals(s) and insurance did not approve more visits     Plan: Continue to work on walking balance and LE strengthening      Goals:  Active       PT Problem       PT Goals (Progressing)       Start:  07/02/24       1.Pt will increase LEFS with 55/80 or better in order to demo an increase in L hip function  2.  Pt will demo 4+/5 or all hip/knee MMT in order to demo an increase in LE strength   3. Pt will be able to ascend and descend 10 steps with reciprocal gait pattern and proper knee control in order to demo and increase in functional mobility   4. Pt will demo compliance and independence with HEP   5.  Pt will be able to walk for 10 mins with step through gait pattern and non antalgic gait pattern with no AD  6. Pt will increase miniBest-est by a minimum of 3 points in order to demo an increase in functional balance

## 2024-10-09 ENCOUNTER — APPOINTMENT (OUTPATIENT)
Dept: PHYSICAL THERAPY | Facility: CLINIC | Age: 79
End: 2024-10-09
Payer: MEDICARE

## 2024-10-14 ENCOUNTER — APPOINTMENT (OUTPATIENT)
Dept: PHYSICAL THERAPY | Facility: CLINIC | Age: 79
End: 2024-10-14
Payer: MEDICARE

## 2024-10-22 ENCOUNTER — APPOINTMENT (OUTPATIENT)
Dept: PHYSICAL THERAPY | Facility: CLINIC | Age: 79
End: 2024-10-22
Payer: MEDICARE

## 2024-11-06 ENCOUNTER — HOSPITAL ENCOUNTER (OUTPATIENT)
Dept: RADIOLOGY | Facility: HOSPITAL | Age: 79
Discharge: HOME | End: 2024-11-06
Payer: MEDICARE

## 2024-11-06 ENCOUNTER — OFFICE VISIT (OUTPATIENT)
Dept: ORTHOPEDIC SURGERY | Facility: HOSPITAL | Age: 79
End: 2024-11-06
Payer: MEDICARE

## 2024-11-06 DIAGNOSIS — M97.02XD PERIPROSTHETIC FRACTURE AROUND INTERNAL PROSTHETIC LEFT HIP JOINT, SUBSEQUENT ENCOUNTER: Primary | ICD-10-CM

## 2024-11-06 DIAGNOSIS — M97.02XD PERIPROSTHETIC FRACTURE AROUND INTERNAL PROSTHETIC LEFT HIP JOINT, SUBSEQUENT ENCOUNTER: ICD-10-CM

## 2024-11-06 PROCEDURE — 99213 OFFICE O/P EST LOW 20 MIN: CPT | Performed by: STUDENT IN AN ORGANIZED HEALTH CARE EDUCATION/TRAINING PROGRAM

## 2024-11-06 PROCEDURE — 1159F MED LIST DOCD IN RCRD: CPT | Performed by: STUDENT IN AN ORGANIZED HEALTH CARE EDUCATION/TRAINING PROGRAM

## 2024-11-06 PROCEDURE — 73502 X-RAY EXAM HIP UNI 2-3 VIEWS: CPT | Mod: LT

## 2024-11-06 PROCEDURE — 1123F ACP DISCUSS/DSCN MKR DOCD: CPT | Performed by: STUDENT IN AN ORGANIZED HEALTH CARE EDUCATION/TRAINING PROGRAM

## 2024-11-06 RX ORDER — LOSARTAN POTASSIUM AND HYDROCHLOROTHIAZIDE 12.5; 1 MG/1; MG/1
TABLET ORAL
COMMUNITY
Start: 2024-09-08

## 2024-11-06 ASSESSMENT — PAIN - FUNCTIONAL ASSESSMENT: PAIN_FUNCTIONAL_ASSESSMENT: NO/DENIES PAIN

## 2024-11-06 NOTE — PROGRESS NOTES
Jazmin Lechuga MD   Adult Reconstruction and Joint Replacement Surgery  Phone: [phone]     Fax: [phone]       Name: Raúl Garcia  Age: 79 y.o.   : 1945   Date of Visit: 2024    Follow-up Hip    CC: Follow-up LEFT hip    History of Present Illness:  This patient presents for follow-up of nonsurgical management of left AG periprosthetic femur fracture. Date of injury May 2024.    They last saw me for this problem on 2024. At the last visit, the patient had diminished pain in the left hip and was making continued progress with gait training with physical therapy. The patient reports continued improvements towards independence but continues to use a cane.  He is requesting updated physical therapy prescription.  His pain has almost diminished completely.  He does not have pain at night.  He does report a recent recurrent fall down carpeted stairs with no apparent sequela.    HISTORY  PROMs   No questionnaires on file.     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 impaction    Other conditions influencing health status 2014    Blockage of ear    Personal history of colonic polyps 2020    History of adenomatous polyp of colon    Personal history of other diseases of the musculoskeletal system and connective tissue     Personal history of arthritis    Personal history of other diseases of the nervous system and sense organs     History of cataract       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 impaction    Other conditions influencing health status 2014    Blockage of ear    Personal history of colonic polyps 2020    History of adenomatous polyp of colon    Personal history of other diseases of the musculoskeletal system and connective tissue     Personal history of arthritis    Personal history of other  diseases of the nervous system and sense organs     History of cataract     Documented in chart and reviewed.     Past Surgical History:   Procedure Laterality Date    CATARACT EXTRACTION  02/19/2014    Cataract Surgery    OTHER SURGICAL HISTORY  01/20/2014    Leg Repair    OTHER SURGICAL HISTORY  05/08/2019    Hip replacement    TOTAL HIP ARTHROPLASTY  04/12/2017    Hip Replacement       Allergies: He is allergic to amlodipine, ibuprofen, and penicillins.     Medications:  Current Outpatient Medications   Medication Instructions    apixaban (ELIQUIS) 5 mg, oral, 2 times daily    atorvastatin (LIPITOR) 20 mg, oral, Daily    famciclovir (FAMVIR) 500 mg, oral, Daily before breakfast    losartan-hydrochlorothiazide (Hyzaar) 100-12.5 mg tablet     metoprolol succinate XL (TOPROL-XL) 25 mg, oral, Daily, Do not crush or chew.    multivit-iron-minerals-folic acid (Centrum Silver) 0.4 mg-300 mcg- 250 mcg tab 1 tablet, Daily       No family history on file.  Documented in chart and reviewed.     Social History     Tobacco Use    Smoking status: Never     Passive exposure: Never    Smokeless tobacco: Never   Substance Use Topics    Alcohol use: Yes     Alcohol/week: 14.0 standard drinks of alcohol     Types: 14 Standard drinks or equivalent per week     Comment: Occasionally        Review of Systems:  Review of systems completed with medical assistant intake. Please refer to this note.     Physical Exam:  BMI: 23.    General: The patient is well appearing and has an appropriate affect.     Neurological Examination: SILT in SPN/DPN/Sural/Saphenous/Tibial nerves. 5/5 EHL, FHL, Tibial anterior, Gastrocnemius. Coordination grossly intact.     Cardiovascular Exam: Capillary refill <2 seconds.     Lymphatic Examination: There is no obvious lymphatic swelling present around the involved joint.    Skin Exam: Skin around the pertinent joint is without evidence of infection or rash.    Gait: patient ambulates with coxalgic  gait.    Left Hip Examination:    Examination of the hip reveals the skin to be intact.     There is no tenderness over the greater trochanter.  There is no crepitation or mobility about the greater trochanteric  fragment.    There is no obvious swelling.    There is a negative Stinchfield test.    Range of motion is: full extension to 90 degrees of flexion.    The hip internally rotates to 15 degrees and externally rotates to 35 degrees.    Abduction is 35 degrees and adduction is 15 degrees.    There is no groin and buttock pain with hip motion.    Abductor strength 4/5.    Right Hip Examination:    Examination of the hip reveals the skin to be intact.    There is no significant tenderness over the greater trochanter.    There is no obvious swelling.    There is a negative Stinchfield test.    Range of motion is: full extension to 90 degrees of flexion.    The hip internally rotates to 15 degrees and externally rotates to 40 degrees.    Abduction is 40 degrees and adduction is 15 degrees.    There is no groin and buttock pain with hip motion.    Abductor strength 4/5.    Right Knee Examination:  Examination of the right knee reveals the skin to be intact.     There is no obvious swelling.    There is no tenderness to palpation.    Range of motion is full extension to 120 degrees of flexion.    The knee is stable to varus/valgus stress testing.    There is no grinding with range of motion.    There is no patellofemoral crepitus.    Left Knee Examination:  Examination of the left knee reveals the skin to be intact.     There is no obvious swelling.    There is no tenderness to palpation.    Range of motion is full extension to 120 degrees of flexion.    The knee is stable to varus/valgus stress testing.    There is no grinding with range of motion.    There is no patellofemoral crepitus.    Imaging:  X-rays were personally reviewed today and show implants in good position with no evidence of complication.  There is  interval callus formation and consolidation of the fracture site.  There is bridging of the fracture site apparent on both AP and crosstable lateral views.    Impression and Plan:  This patient presents for follow-up evaluation of LEFT hip.    DIAGNOSIS:   Periprosthetic fracture around internal prosthetic left hip joint, subsequent encounter     I have discussed the options in detail with the patient. We have discussed anti-inflammatory medication, activity modification, physical therapy, corticosteroid injections, and total hip replacement surgery.  There is no indication for surgery at this time.    The risks and benefits of all these treatment options have been discussed in detail. The patient has tried at least 3 months of the above conservative treatments and continues to have disabling pain, impaired activities of daily living and worsened quality of life.  Reviewed the surgical optimization steps to optimize their chances for a successful joint replacement surgery.      A physical therapy prescription was ordered for the patient for continued work with gait training.  Patient will continue their home exercise program.Strategies for pain management using over-the-counter anti-inflammatory medications reviewed.  Encouraged them to maintain range of motion and strength around the hip and knee joints.  They will continue to implement these strategies in addressing their pain.      The patient has recovered well from nonsurgically managed left Ag periprosthetic femur fracture.  Encouraged him to continue following posterior precautions.  He may continue weightbearing as tolerated.  He may follow-up as needed for this problem or routinely for surveillance of his hip replacement.  The patient verbalizes understanding with the recommendations and treatment plan as outlined above and is in agreement.  Questions were addressed.    RTC: As needed    X-rays at next visit: As needed    _____________________  Jazmin PAZ  MD Ankush   Attending Orthopaedic Surgeon  [facility]    [facility]    This office note was transcribed with dictation software.  Please excuse any typographical errors, program misunderstandings leading to inadvertent insertions or deletions of inappropriate wording, pronoun errors and other unintentional transcription errors not noticed on proof-reading.

## 2024-11-07 ENCOUNTER — APPOINTMENT (OUTPATIENT)
Dept: PHYSICAL THERAPY | Facility: CLINIC | Age: 79
End: 2024-11-07
Payer: MEDICARE

## 2024-11-12 ENCOUNTER — APPOINTMENT (OUTPATIENT)
Dept: ORTHOPEDIC SURGERY | Facility: HOSPITAL | Age: 79
End: 2024-11-12
Payer: MEDICARE

## 2024-11-18 ENCOUNTER — OFFICE VISIT (OUTPATIENT)
Dept: ORTHOPEDIC SURGERY | Facility: HOSPITAL | Age: 79
End: 2024-11-18
Payer: MEDICARE

## 2024-11-18 DIAGNOSIS — M67.442 GANGLION CYST OF FINGER OF LEFT HAND: Primary | ICD-10-CM

## 2024-11-18 PROCEDURE — 2500000004 HC RX 250 GENERAL PHARMACY W/ HCPCS (ALT 636 FOR OP/ED): Performed by: STUDENT IN AN ORGANIZED HEALTH CARE EDUCATION/TRAINING PROGRAM

## 2024-11-18 PROCEDURE — 99213 OFFICE O/P EST LOW 20 MIN: CPT | Performed by: STUDENT IN AN ORGANIZED HEALTH CARE EDUCATION/TRAINING PROGRAM

## 2024-11-18 RX ORDER — LIDOCAINE HYDROCHLORIDE 10 MG/ML
2 INJECTION, SOLUTION INFILTRATION; PERINEURAL
Status: COMPLETED | OUTPATIENT
Start: 2024-11-18 | End: 2024-11-18

## 2024-11-18 ASSESSMENT — PAIN - FUNCTIONAL ASSESSMENT: PAIN_FUNCTIONAL_ASSESSMENT: NO/DENIES PAIN

## 2024-11-19 NOTE — PROGRESS NOTES
[facility]   Hand and Upper Extremity Service  Initial evaluation / Consultation        Consult requested by Referring Physician: Jazmin Lechuga MD     Chief Complaint: Left thumb soft tissue mass     Patient is a pleasant 79-year-old male who presents with a soft tissue mass to the left thumb over the dorsal ulnar side.  He notes it has been present for approximately 3 months.  He denies any trauma or injury to this area of his forearm.  There is minimal discomfort to the mass.  He notes that it has not changed in significant size of the left 3 months.  He denies any numbness or tingling.        Please refer to New Patient Intake Form scanned into patient's electronic record for self reported past medical history, past surgical history, medications, allergies, family history, social history and 10 point review of systems     Examination:  Constitutional: Oriented to person, place, and time.  Appears well-developed and well-nourished.  Head: Normocephalic and atraumatic.  Eyes: Pupils are equal, round, and reactive to light.  Cardiovascular: Intact distal pulses.   Pulmonary/Chest/Breast: Effort normal. No respiratory distress.  Neurological: Alert and oriented to person, place, and time.  Skin: Skin is warm and dry.  Psychiatric: normal mood and affect.  Behavior is normal.  Musculoskeletal: Left thumb  There is a 2 cm x 1.5 cm x 1 cm mass to the dorsal ulnar side of his left thumb healed the MCP joint.  This mass transilluminates.  It is fluctuant.  It is nontender to palpation.  It does not move with thumb range of motion.  AIN, PIN, ulnar motor nerves intact.  Sensation intact to light touch radial, with, median nerves.  Brisk capillary refill       Personal Interpretation of Diagnostic studies: None         Impression: Ganglion cyst the dorsum of the right thumb.         In Office Procedures Performed: Aspiration of the ganglion cyst with patient tolerated procedure well.   Approximately 3 cc of gelatinous fluid was removed.  The cyst was fully decompressed.         Medical Decision Making:   The pathogenesis and treatment of ganglion cysts was reviewed in detail today.  We reviewed possible treatment options including continued observation, attempted aspiration, and surgical excision.  We discussed that these masses are benign in nature without significant malignant transformation potential.  We discussed that an MRI scan of the wrist can be obtained to confirm the diagnosis, however this diagnosis can be made based on clinical examination alone in most cases.  We discussed that an aspiration of the cyst can be attempted, however this has a high recurrence rate..  We also discussed the possibility of surgical excision, and reviewed the inherent risks and benefits of this operation including the possible recurrence rate of approximately 10-20% following surgical excision.    They elected for aspiration of the cyst.      Medications Prescribed: None        Follow up: As needed     Hand / UE Inj/Asp (Left thumb) for dorsal carpal ganglion on 11/18/2024 9:45 PM  Indications: therapeutic, diagnostic and pain  Details: 18 G needle, dorsal approach  Medications: 2 mL lidocaine 10 mg/mL (1 %)  Aspirate: 3 mL  Outcome: tolerated well, no immediate complications  Procedure, treatment alternatives, risks and benefits explained, specific risks discussed. Consent was given by the patient. Immediately prior to procedure a time out was called to verify the correct patient, procedure, equipment, support staff and site/side marked as required. Patient was prepped and draped in the usual sterile fashion.      Kelly Milton DO  [facility]  Department of Orthopaedic Surgery  Hand and Upper Extremity Reconstruction           Dictation performed with the use of voice recognition software.  Syntax and grammatical errors may exist.

## 2024-12-08 ENCOUNTER — TELEPHONE (OUTPATIENT)
Dept: GERIATRIC MEDICINE | Facility: HOSPITAL | Age: 79
End: 2024-12-08
Payer: MEDICARE

## 2024-12-09 ENCOUNTER — NURSING HOME VISIT (OUTPATIENT)
Dept: POST ACUTE CARE | Facility: EXTERNAL LOCATION | Age: 79
End: 2024-12-09
Payer: MEDICARE

## 2024-12-09 VITALS
BODY MASS INDEX: 23.08 KG/M2 | DIASTOLIC BLOOD PRESSURE: 88 MMHG | HEART RATE: 86 BPM | WEIGHT: 143 LBS | OXYGEN SATURATION: 97 % | SYSTOLIC BLOOD PRESSURE: 148 MMHG | TEMPERATURE: 96.9 F | RESPIRATION RATE: 16 BRPM

## 2024-12-09 DIAGNOSIS — R55 SYNCOPE, UNSPECIFIED SYNCOPE TYPE: ICD-10-CM

## 2024-12-09 DIAGNOSIS — I48.0 PAROXYSMAL ATRIAL FIBRILLATION (MULTI): ICD-10-CM

## 2024-12-09 DIAGNOSIS — R00.1 BRADYCARDIA: ICD-10-CM

## 2024-12-09 PROCEDURE — 99308 SBSQ NF CARE LOW MDM 20: CPT | Performed by: CLINICAL NURSE SPECIALIST

## 2024-12-09 RX ORDER — ACETAMINOPHEN 500 MG
1000 TABLET ORAL 3 TIMES DAILY
COMMUNITY

## 2024-12-09 RX ORDER — LIDOCAINE 560 MG/1
1 PATCH PERCUTANEOUS; TOPICAL; TRANSDERMAL DAILY
COMMUNITY

## 2024-12-09 RX ORDER — QUETIAPINE FUMARATE 25 MG/1
12.5 TABLET, FILM COATED ORAL NIGHTLY
COMMUNITY
End: 2024-12-10 | Stop reason: ALTCHOICE

## 2024-12-09 RX ORDER — FAMCICLOVIR 125 MG/1
125 TABLET ORAL DAILY
COMMUNITY

## 2024-12-09 NOTE — TELEPHONE ENCOUNTER
Late entry. Got paged an hour ago for unwitnessed fall with possible acute pain.   Pt is a recent admission to [facility] hospital 2/2 Closed fx of R periprosthetic hip and R humeral fx, TTWB RLE, NWB RUE. Has a kitchen.    Per nurse, pt seemed to got out of w/c on his own to bed. Found on the floor. VS which are remarkable 150s/90s  RR 22, not in distress. Pt confused since admission, refuses to take tylenol (only pain med he has), unable to verbalize needs, unsure if w worsened pain since the fall. Noncompliant with R shoulder immobilization.    Reviewed hospitalization: pt was delirious trying to get out of bed and actually had a fall. Found ton have acute urinary retention s/p kitchen. Primary worried about side effects from opioids. Pt needed haldol IV, ativan, seroquel to get CT head which didn't show acute findings. At one point, required wrist restraints.     Orders given to the nurse:   - in-house xray of R hip and R shoulder vs ER eval for faster work up>>deferred decision to wife who stated ok to do in-house xrays instead.   - CMP, CBC, TSH, B12 and D 25 OH tomorrow  - change prn seroquel to 12.5mg nightly and seroquel 12.5mg daily PRN agitation  - may take meds crushed in applesauce  - address pain by encouraging to take tylenol, add lidocaine patch to R shoulder and R hip.   Nurse verbalized understanding.     Forwarding to NP to f/up tomorrow. FYI to [facility] Attending Physician.  
No.

## 2024-12-09 NOTE — LETTER
"Patient: Raúl Garcia  : 1945    Encounter Date: 2024    Reason for visit: acute visit due to fall yesterday    HPI: Raúl was admitted to [facility] yesterday from [facility] after a 3 day stay, s/p fall while at a restaurant on .  R femur periprosthetic greater trochanter fx, R humeral head fx; metabolic acidosis, leukocytosis. He was stabilized and it was ordered by ortho to be nonweight bearing to his arm and TTWB right leg until a follow up visit. While in [place] he did have acute confusion and required order for quetiapine which is being continued here and a kitchen was placed presumed due to immobility but no records found today regarding the kitchen. His lab workup was ok other than mild elevation of creatinine and cervical xray showed degenerative changes. The plan is for rehab here and return home with his wife.     Past Medical History: afib, CAD, HTN, neuralgia, shingles, hypercholestermia     Chief Complaint: “I guess I hurt everywhere. I dont remember falling yesterday\"    Review of Systems   General: feels fine today, no fever or chills, thinks he slept ok last night but admits memory is poor; appetite is good  Skin: aware of bruising on right side of body; no skin lesions  EENT: vision and hearing adequate; denies trouble chewing or swallowing  Respiratory: no cough, congestion or shortness of breath  Cardiac: no chest pain or palpitations  Gastrointestinal: no abdominal pain, nausea, diarrhea or constipation; BM every day without straining; no rectal pain or bleeding; no hemorrhoids  Genitourinary: aware he has a kitchen, denies discomfort  Musculoskeletal: no pain at rest but once he moved a little he said he had pain \"everywhere\"  Neurologic: denies hx of confusion but says he is confused now, not knowing where he is or recalling falling yesterday and not aware of his current fractures; no headaches, dizziness or lightheadedness; no tremors or involuntary movements; no altered " sensation; no unilateral weakness  Psychiatric: aware of confusion; willing to participate in therapy    Physical Examination   Vitals: Blood pressure 148/88, pulse 86, temperature 36.1 °C (96.9 °F), resp. rate 16, weight 64.9 kg (143 lb), SpO2 97%.  General: resting in bed, on back, head down, seemed comfortable until I raised his head a little for a drink then he c/o pain; he is bruised on his entire right arm and hip; he is pleasant but confused and not aware that he is in rehab or of the nonweight bearing  Neurologic: alert, oriented x1 currently;  speech clear and fluent; facial features symmetrical and tongue midline; MOTA equal but right side limited due to pain  Skin: no abnormalities of hair or nails; right side of body with a lot of bruising; no altered skin  HEENT: no trauma, bruises, swelling; adequate hearing of close conversation; oral mucosa moist, pink  Respiratory: unlabored; chest symmetrical w/ good rise and fall; no coughing, breath sounds equal and clear throughout  Heart: regular rhythm, rate controlled; normal heart sounds w/ no murmurs, rubs and gallops  Abdomen: nondistended, nontenderness, bowel sounds active x4  Musculoskeletal: did not assess ROM but he is able to MOTA  PV: skin warm, dry, no cyanosis, no clubbing of nails; no edema; palpable radial and pedal pulses  : kitchen with clear pale yellow urine, moderate amount  Psych: pleasant and cooperative but poor insight    Diagnoses and Plan:   Fall without injury (yesterday)- right proximal humeral fracture stable on xray today; nursing precautions in place to prevent falls  Acute pain - getting tylenol routinely; will have to see how the tolerates therapy today; also getting lidocaine patch to right shoulder and right hip  Right proximal humerus fracture, right periprosthetic hip fracture  Weight bearing: Nonweightbearing in shoulder immobilizer to right upper extremity, toe-touch weightbearing to right lower extremity with no active  abduction for 6 weeks; he was refusing to wear the arm sling but PT will be in today to assess and discuss with him  -follow up appointment with Derian BARNETT in 2 weeks   Acute delirium - started in the hospital; on minimal medications now; seroquel at bedtime; will see how he does today with interactions with therapy; no acute findings other than hospital psychosis; no known hx dementia  Presence of kitchen catheter - not clear if retention but could not find it mentioned; will reassess in few days. So far tolerating kitchen  Will be seen for initial assessment by Dr Hernandez tomorrow.       Electronically Signed By: EPI Robbins-CNS   12/9/24  9:50 PM

## 2024-12-10 ENCOUNTER — NURSING HOME VISIT (OUTPATIENT)
Dept: POST ACUTE CARE | Facility: EXTERNAL LOCATION | Age: 79
End: 2024-12-10
Payer: MEDICARE

## 2024-12-10 VITALS
HEART RATE: 91 BPM | BODY MASS INDEX: 23.11 KG/M2 | RESPIRATION RATE: 20 BRPM | TEMPERATURE: 98.2 F | DIASTOLIC BLOOD PRESSURE: 95 MMHG | SYSTOLIC BLOOD PRESSURE: 131 MMHG | WEIGHT: 143.2 LBS | OXYGEN SATURATION: 98 %

## 2024-12-10 DIAGNOSIS — R33.9 URINARY RETENTION: ICD-10-CM

## 2024-12-10 DIAGNOSIS — E87.6 HYPOKALEMIA: ICD-10-CM

## 2024-12-10 DIAGNOSIS — Z96.649 PERIPROSTHETIC FRACTURE OF HIP, SUBSEQUENT ENCOUNTER: ICD-10-CM

## 2024-12-10 DIAGNOSIS — Z71.89 ADVANCE CARE PLANNING: ICD-10-CM

## 2024-12-10 DIAGNOSIS — N18.30 STAGE 3 CHRONIC KIDNEY DISEASE, UNSPECIFIED WHETHER STAGE 3A OR 3B CKD (MULTI): ICD-10-CM

## 2024-12-10 DIAGNOSIS — M97.8XXD PERIPROSTHETIC FRACTURE OF HIP, SUBSEQUENT ENCOUNTER: ICD-10-CM

## 2024-12-10 DIAGNOSIS — Z97.8 FOLEY CATHETER IN PLACE: ICD-10-CM

## 2024-12-10 DIAGNOSIS — R41.0 DELIRIUM: ICD-10-CM

## 2024-12-10 DIAGNOSIS — E55.9 VITAMIN D DEFICIENCY: ICD-10-CM

## 2024-12-10 DIAGNOSIS — M80.00XA AGE-RELATED OSTEOPOROSIS WITH CURRENT PATHOLOGICAL FRACTURE, INITIAL ENCOUNTER: ICD-10-CM

## 2024-12-10 DIAGNOSIS — S42.294D OTHER CLOSED NONDISPLACED FRACTURE OF PROXIMAL END OF RIGHT HUMERUS WITH ROUTINE HEALING, SUBSEQUENT ENCOUNTER: ICD-10-CM

## 2024-12-10 DIAGNOSIS — R53.81 DEBILITY: Primary | ICD-10-CM

## 2024-12-10 PROBLEM — N17.9 AKI (ACUTE KIDNEY INJURY) (CMS-HCC): Status: ACTIVE | Noted: 2024-12-05

## 2024-12-10 PROBLEM — H18.529 CORNEAL EPITHELIAL BASEMENT MEMBRANE DYSTROPHY: Status: ACTIVE | Noted: 2024-12-10

## 2024-12-10 PROBLEM — S72.001A CLOSED FRACTURE OF RIGHT HIP: Status: ACTIVE | Noted: 2024-12-05
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
## 2024-12-10 PROBLEM — R73.03 PREDIABETES: Status: ACTIVE | Noted: 2024-12-10

## 2024-12-10 PROBLEM — I48.0 PAROXYSMAL ATRIAL FIBRILLATION (MULTI): Status: ACTIVE | Noted: 2024-12-05

## 2024-12-10 PROBLEM — I24.89 DEMAND ISCHEMIA (MULTI): Status: ACTIVE | Noted: 2024-12-05

## 2024-12-10 PROBLEM — Z98.890 HISTORY OF REPAIR OF HIP JOINT: Status: ACTIVE | Noted: 2023-05-17

## 2024-12-10 PROBLEM — Z86.0101 HISTORY OF ADENOMATOUS POLYP OF COLON: Status: ACTIVE | Noted: 2024-12-10

## 2024-12-10 PROBLEM — S42.201D CLOSED FRACTURE OF PROXIMAL END OF RIGHT HUMERUS WITH ROUTINE HEALING: Status: ACTIVE | Noted: 2024-12-10

## 2024-12-10 PROBLEM — H91.93 BILATERAL HEARING LOSS: Status: ACTIVE | Noted: 2024-12-10

## 2024-12-10 PROCEDURE — 99305 1ST NF CARE MODERATE MDM 35: CPT | Performed by: INTERNAL MEDICINE

## 2024-12-10 RX ORDER — ERGOCALCIFEROL 1.25 MG/1
1.25 CAPSULE ORAL WEEKLY
COMMUNITY
Start: 2024-12-11 | End: 2025-02-05

## 2024-12-10 RX ORDER — TALC
3 POWDER (GRAM) TOPICAL NIGHTLY
COMMUNITY

## 2024-12-10 RX ORDER — QUETIAPINE FUMARATE 25 MG/1
12.5 TABLET, FILM COATED ORAL DAILY PRN
COMMUNITY

## 2024-12-10 RX ORDER — POTASSIUM CHLORIDE 20 MEQ/1
20 TABLET, EXTENDED RELEASE ORAL DAILY
COMMUNITY

## 2024-12-10 NOTE — LETTER
Patient: Raúl Garcia  : 1945    Encounter Date: 12/10/2024        Division: Geriatrics  Date of Service: 12/10/2024  Visit Type: Skilled Rehabilitation Unit Admission History and Physical    Reason for admission to [facility] for post-acute rehab     Chief complaint: Debility    Admitted from: home  Facility: [facility]  Dates of hospitalization: 2024-2024  Hospital Course:  Mr. Raúl Garcia 79 y.o. year old male was admitted for fall at a restaurant.  *DC summary not available at this time.     Per [facility] note:   R femur periprosthetic greater trochanter fx, R humeral head fx; metabolic acidosis, leukocytosis. He was stabilized and it was ordered by ortho to be nonweight bearing to his arm and TTWB right leg until a follow up visit. While in [place] he did have acute confusion and required order for quetiapine which is being continued here and a kitchen was placed presumed due to immobility but no records found today regarding the kitchen. His lab workup was ok other than mild elevation of creatinine and cervical xray showed degenerative changes. The plan is for rehab here and return home with his wife.     The pt was then transferred to [facility] for skilled rehab on 2024    Subjective  History obtained from patient,  facility staff (nurse, aid, PT/OT/SLP, SW and/or DON), HCPOA, medical records, and Geriatrics care team. Collateral history obtained due to delirium.    HPI    Weekend events when I was on call:   2024  Got paged an hour ago for unwitnessed fall with possible acute pain.   Pt is a recent admission to [facility]  Closed fx of R periprosthetic hip and R humeral fx, TTWB RLE, NWB RUE. Has a kitchen.     Per nurse, pt seemed to got out of w/c on his own to bed. Found on the floor. VS which are remarkable 150s/90s  RR 22, not in distress. Pt confused since admission, refuses to take tylenol (only pain med he has), unable to verbalize  needs, unsure if w worsened pain since the fall. Noncompliant with R shoulder immobilization.     Reviewed hospitalization: pt was delirious trying to get out of bed and actually had a fall. Found ton have acute urinary retention s/p kitchen. Primary worried about side effects from opioids. Pt needed haldol IV, ativan, seroquel to get CT head which didn't show acute findings. At one point, required wrist restraints.      Orders given to the nurse:   - in-house xray of R hip and R shoulder vs ER eval for faster work up>>deferred decision to wife who stated ok to do in-house xrays instead.   - CMP, CBC, TSH, B12 and D 25 OH tomorrow  - change prn seroquel to 12.5mg nightly and seroquel 12.5mg daily PRN agitation  - may take meds crushed in applesauce  - address pain by encouraging to take tylenol, add lidocaine patch to R shoulder and R hip.   Nurse verbalized understanding.     Repeat xrays were unremarkable. I personally reviewed them.     Wife at bedside. Still delirious per wife. Per nurse, noncompliant with taking meds and wearing shoulder immobilizer. Pt denies pain today. Wife thinks that percocet contributed to the confusion. She feels that pain is controlled w just tylenol as pt has high tolerance.     Remains to have kitchen.    Last recorded BM was yesterday.     Pt inattentive, has poor recall of the events and so unable to provide much hx.     Per wife, afib has been ruled out before and so OAC was stopped and pt is not taking it outpatient.    Medical Hx reviewed. See below.     PCP: Pavel Irizarry MD    Past Medical History:   Diagnosis Date   • Impacted cerumen, bilateral 12/07/2016    Bilateral hearing loss due to cerumen impaction   • Other conditions influencing health status 01/20/2014    Blockage of ear   • Personal history of colonic polyps 05/13/2020    History of adenomatous polyp of colon   • Personal history of other diseases of the musculoskeletal system and connective tissue     Personal  history of arthritis   • Personal history of other diseases of the nervous system and sense organs     History of cataract   -Also has a history of CAD, shingles, HLP, hypertension, neuralgia  Past Surgical History:   Procedure Laterality Date   • CATARACT EXTRACTION  02/19/2014    Cataract Surgery   • OTHER SURGICAL HISTORY  01/20/2014    Leg Repair   • OTHER SURGICAL HISTORY  05/08/2019    Hip replacement   • TOTAL HIP ARTHROPLASTY  04/12/2017    Hip Replacement     No family history on file.  Social History     Tobacco Use   • Smoking status: Never     Passive exposure: Never   • Smokeless tobacco: Never   Substance Use Topics   • Alcohol use: Yes     Alcohol/week: 14.0 standard drinks of alcohol     Types: 14 Standard drinks or equivalent per week     Comment: Occasionally       Allergies: Amlodipine, Ibuprofen, and Penicillins      Living environment prior to admission:   Per in-house therapy:   HOME LIVING  Patient Lives With: Spouse  Assistance Available: PRN  Entry To Home: Stairs, With Rail  Number Of Stairs Into Home: 3  Number Of Stairs To Bed/Bath: 0  Tub/Shower Type: walk-in shower  Laundry: wife does  Equipment Owned: Grab Bars- Shower, Shower Chair, Cane, Walker- Wheeled    PRIOR FUNCTIONAL LEVEL  Required Assistance  Assistance Required With: Cleaning, Laundry, Meals, Shopping, Transportation   per pt - IND with cane for ambulation; IND w ADL; assist with IADL prn from spouse.     Advanced Directives on file: <no information>    Medications reviewed and reconciled.   Current Outpatient Medications   Medication Sig Dispense Refill   • acetaminophen (Tylenol) 500 mg tablet Take 2 tablets (1,000 mg) by mouth 3 times a day.     • apixaban (Eliquis) 5 mg tablet Take 0.5 tablets (2.5 mg) by mouth 2 times a day. 90 tablet 3   • atorvastatin (Lipitor) 20 mg tablet Take 1 tablet (20 mg) by mouth once daily. 90 tablet 3   • [START ON 12/11/2024] ergocalciferol (Vitamin D-2) 1.25 MG (65181 UT) capsule Take 1  capsule (1,250 mcg) by mouth 1 (one) time per week. Do not fill before December 11, 2024.     • famciclovir (Famvir) 125 mg tablet Take 1 tablet (125 mg) by mouth once daily.     • hypromellose (Vista Gonio) 2.5 % ophthalmic solution Administer 1 drop into both eyes 4 times a day as needed for dry eyes.     • lidocaine (Lidocare) 4 % patch Place 1 patch on the skin once daily. Remove & discard patch within 12 hours or as directed by MD. on the right hip and right shoulder     • melatonin 3 mg tablet Take 1 tablet (3 mg) by mouth once daily at bedtime.     • metoprolol succinate XL (Toprol-XL) 25 mg 24 hr tablet Take 1 tablet (25 mg) by mouth once daily. Do not crush or chew. 30 tablet 11   • potassium chloride CR 20 mEq ER tablet Take 1 tablet (20 mEq) by mouth once daily. Do not crush or chew.     • QUEtiapine (SEROquel) 25 mg tablet Take 0.5 tablets (12.5 mg) by mouth once daily as needed.       No current facility-administered medications for this visit.       Objective  BP (!) 131/95   Pulse 91   Temp 36.8 °C (98.2 °F)   Resp 20   Wt 65 kg (143 lb 3.2 oz)   SpO2 98%   BMI 23.11 kg/m²   Physical Exam  Vitals and nursing note reviewed.   Constitutional:       General: He is not in acute distress.     Appearance: Normal appearance. He is not ill-appearing.   HENT:      Head: Normocephalic and atraumatic.      Right Ear: External ear normal.      Left Ear: External ear normal.      Nose: Nose normal.      Mouth/Throat:      Mouth: Mucous membranes are moist.      Pharynx: Oropharynx is clear.   Eyes:      Extraocular Movements: Extraocular movements intact.      Conjunctiva/sclera: Conjunctivae normal.      Pupils: Pupils are equal, round, and reactive to light.   Cardiovascular:      Rate and Rhythm: Normal rate and regular rhythm.      Pulses: Normal pulses.      Heart sounds: Normal heart sounds. No murmur heard.  Pulmonary:      Effort: Pulmonary effort is normal.      Breath sounds: Normal breath sounds.  "  Abdominal:      General: Bowel sounds are normal.      Palpations: Abdomen is soft.   Musculoskeletal:         General: No swelling.      Cervical back: Normal range of motion.      Right lower leg: No edema.      Left lower leg: No edema.   Skin:     General: Skin is warm and dry.   Neurological:      General: No focal deficit present.      Mental Status: He is alert. He is disoriented.      Gait: Gait abnormal (sitting in w/c, able to move RLE).      Comments: Knows wife, self, thinks he is in the hospital. Oriented to month but not exact date and year.   Inattentive  Poor recall, poor insight and judgment  Follows commands.             Labs/Imaging reviewed.          Lab Results   Component Value Date    WBC 6.3 06/26/2024    HGB 11.5 (L) 06/26/2024    HCT 32.8 (L) 06/26/2024    MCV 88 06/26/2024     06/26/2024    LYMPHOPCT 25.1 06/26/2024    RBC 3.73 (L) 06/26/2024    MCH 30.8 06/26/2024    MCHC 35.1 06/26/2024    RDW 11.8 06/26/2024     Lab Results   Component Value Date     (L) 08/28/2024    K 4.6 08/28/2024    CL 99 08/28/2024    CO2 25 08/28/2024    BUN 18 08/28/2024    CREATININE 1.34 (H) 08/28/2024    GLUCOSE 94 08/28/2024    CALCIUM 9.1 08/28/2024    PROT 6.8 05/20/2024    BILITOT 0.9 05/20/2024    ALKPHOS 118 05/20/2024    AST 16 05/20/2024    ALT 13 05/20/2024     No results found for: \"TSH\"  No results found for: \"FREET4\"  Lab Results   Component Value Date    KUKUOOSC06 470 05/12/2021     Lab Results   Component Value Date    HGBA1C 5.5 12/05/2024    HGBA1C 5.7 (A) 05/16/2022    HGBA1C 6.2 05/12/2021     No results found for: \"VITD25\"     No results found for this or any previous visit from the past 365 days.       CT head wo IV contrast 12/06/2024    Narrative  * * *Final Report* * *    DATE OF EXAM: Dec  6 2024  6:17AM    [facility]   0504  -  CT BRAIN WO IVCON   / ACCESSION #  [id number]    PROCEDURE REASON: Mental status change, unknown cause    * * * * Physician Interpretation * * * " *    RESULT: EXAMINATION: CT BRAIN WO IVCON, CT CERVICAL SPINE WO IVCON    CLINICAL HISTORY: Neck trauma.  Mental status change, unknown cause    TECHNIQUE:  Serial axial images without IV contrast were obtained from  the vertex to the foramen magnum. Axial images were obtained of the  cervical spine without intravenous contrast. Reformatted sagittal and  coronal images were provided for review.      CT Radiation dose: Integrated Dose-Length Product (DLP) for this visit =  1189  mGy*cm  CT Dose Reduction Employed: Automated exposure control(AEC) and iterative  recon    COMPARISON: CT brain 06/11/2024.    RESULT:     (topogram) images: No additional findings.    CT head:    Evaluation is partially obscured with only axial images provided.  There  is additional mild motion artifact.    Stable confluent areas of subcortical and periventricular white matter  low attenuation are seen which are nonspecific but consistent with  ischemic change from severe small vessel disease.  There is no evidence  of a mass lesion, hemorrhage, acute infarction or midline shift.  The  gray-white matter differentiation is preserved.  There is stable  prominence of the ventricles and cerebral sulci in keeping with mild  cerebral atrophy.  No extra-axial fluid collections are present.  The  basal cisterns are patent.    The calvarium is intact.  Complete opacification of the included right  maxillary sinus and patchy opacification of the bilateral ethmoid air  cells is nonspecific but stable.  The mastoid air cells are clear.    Reformatted images support the above findings.    CT cervical spine:    No acute fracture is identified. Normal alignment without subluxation.  Normal alignment of facet joints.    The prevertebral soft tissues are not thickened. Predental space is not  widened. The relationship of the dens and atlas is normal.  Mild to  moderate degenerative changes between the anterior to C1 and the dens.    Moderate  degenerative projecting endplate osteophytes, some of which are  bridging, in the mid to lower cervical spine.  Mild disc height loss  throughout the cervical spine.  Moderate to severe facet arthropathy at  C4-5 and C5-6 on the right.  Mild facet arthropathy scattered elsewhere  in the cervical and included upper thoracic spine.    Assessment of the soft tissues of the neck shows no obvious localizing  soft tissue edema for hematoma formation. No worrisome lymphadenopathy.    Mild to moderate degenerative changes of the left glenohumeral joint with  decrease in the acromiohumeral distance from rotator cuff tendon tear,  likely chronic.    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  Mild to moderate  calcification of the carotid bulbs.    Impression:    1. No acute intracranial abnormality.    2. No evidence of acute osseous abnormality of the cervical spine.    3. Mild to moderate multifactorial degenerative changes of the cervical  spine.    4. Complete opacification of the included right maxillary sinus with  patchy opacification of the bilateral ethmoid air cells is stable and  nonspecific.  Correlate for sinus disease.        Transcribed Using Voice Recognition  Transcribe Date/Time: Dec  6 2024  6:20A    Dictated by: KENYETTA SAVAGE MD    This examination was interpreted and the report reviewed and  electronically signed by:  KENYETTA SAVAGE MD on Dec  6 2024  6:37AM  EST      CT head wo IV contrast 06/11/2024    Narrative  * * *Final Report* * *    DATE OF EXAM: Jun 11 2024  9:05PM    [facility]   0504  -  CT BRAIN WO IVCON   / ACCESSION #  [id number]    PROCEDURE REASON: Syncope, simple, abnormal neuro exam    * * * * Physician Interpretation * * * *    RESULT: EXAMINATION:  CT BRAIN WITHOUT CONTRAST    CLINICAL HISTORY:  Syncope, simple, abnormal neuro exam    TECHNIQUE:  Routine CT scan of the brain without contrast.  Serial axial  unenhanced images were obtained from the  vertex to the  foramen magnum.    CT Dose-Length Product (DLP): 1072  mGy*cm  CT Dose Reduction Employed: Iterative recon and mAs-kVp adjusted using  patient size-age    COMPARISON:  None.      RESULT:    Hemorrhage:    No evidence of acute intracranial hemorrhage.    Mass Effect / Mass Lesion:    There is no evidence of an intracranial  mass or extraaxial fluid collection.  No significant mass effect.    Chronic change:    Patchy foci of  low attenuation coefficient are  present within the supratentorial white matter which is a nonspecific  finding but likely represents microvascular ischemia.    Parenchyma:  There is mild generalized volume loss.    Ventricles:     The ventricles are within normal limits of size and  configuration for age.    Other:  There is complete opacification of the right maxillary sinus.  The remainder of the visualized paranasal sinuses are grossly clear.  The  skull and visualized extracranial soft tissues are grossly normal.    Impression  IMPRESSION:    No CT evidence of acute intracranial abnormality.    Right maxillary sinus disease.        Transcribed Using Voice Recognition  Transcribe Date/Time: Jun 11 2024  9:23P    Dictated by: DAVIN SANDHU MD    This examination was interpreted and the report reviewed and  electronically signed by:  DAVIN SANDHU MD on Jun 11 2024  9:26PM  EST     No results found for this or any previous visit from the past 365 days.      Assessment/Plan   Problem List Items Addressed This Visit             ICD-10-CM       Advance Directives and General Issues    Advance care planning Z71.89     - wife reports she is the HCPOA. Pt full code            Endocrine/Metabolic    Vitamin D deficiency E55.9     - replace with high dose D x 8 weeks then recheck level            Genitourinary and Reproductive    Urinary retention R33.9     - discussed with wife about voiding trial but she is hesitant right now due to pt's limited mobility. Advised that we can have urinal within reach  but wife declines at this time. She is made aware that having a kitchen can increase UTI risks.   - will dc kitchen once able to have full WB on RLE         Kitchen catheter in place Z97.8    Hypokalemia E87.6     -chronic per wife.   - start potassium chloride 20meq daily. Bmp, mg and cbc on 12/16         Stage 3 chronic kidney disease (Multi) N18.30     - seen by nephro inpatient who noted that cr was 1.2 earlier this year but currently lingering around 1.6. No further recommendations by nephro except avoid dehydration.            Musculoskeletal and Injuries    Periprosthetic fracture of hip M97.8XXA, Z96.649     - wife wants to avoid opioid for pt. Continue tylenol 1g TID and also lidocaine patch to R shoulder and R hip  - wife wants pt to f/up w original ortho surgeon. She will call for appt with Dr. Lechuga for pt to have a 2 week f/up. Discussed w her that Xrays can be done in-house and xray CDs sent to ortho.  - TTWB RLE x 6 weeks  - DVT prophylaxis with eliquis til 12/21 (14 d course)         Age-related osteoporosis with current pathological fracture M80.00XA     - hx also of L hip fx early this year.   - no tx before for osteoporosis.   - will need tx outpatient but first Vit D needs replaced.         Closed fracture of proximal end of right humerus with routine healing S42.201D     - noncompliant with NWB and immobilizer which is recommended x 6 weeks.             Neuro    Delirium R41.0     - still present  - continue prn seroquel 12.5mg daily  - dc scheduled seroquel 12.5mg nightly  - start melatonin 3mg nightly  - labs unremarkable except for vit d  - no baseline cognitive issues per wife.            Symptoms and Signs    Debility - Primary R53.81     - to start Pt/OT. Progress will be prolonged d/t TTWB RLE x 6 weeks.  - goal is for pt to go back home with wife. Baseline mobility: uses a cane.               Discussed case with: patient,  facility staff (nurse, aid, PT/OT/SLP, SW and/or DON), family  member, and Geriatrics care team    Electronically signed by: Kayla Hernandez MD      Electronically Signed By: Kayla Hernandez MD   12/10/24  2:28 PM

## 2024-12-10 NOTE — ASSESSMENT & PLAN NOTE
- to start Pt/OT. Progress will be prolonged d/t TTWB RLE x 6 weeks.  - goal is for pt to go back home with wife. Baseline mobility: uses a cane.

## 2024-12-10 NOTE — ASSESSMENT & PLAN NOTE
- hx also of L hip fx early this year.   - no tx before for osteoporosis.   - will need tx outpatient but first Vit D needs replaced.

## 2024-12-10 NOTE — PROGRESS NOTES
"Reason for visit: acute visit due to fall yesterday    HPI: Raúl was admitted to [facility] yesterday from [facility] after a 3 day stay, s/p fall while at a restaurant on 12/4.  R femur periprosthetic greater trochanter fx, R humeral head fx; metabolic acidosis, leukocytosis. He was stabilized and it was ordered by ortho to be nonweight bearing to his arm and TTWB right leg until a follow up visit. While in [place] he did have acute confusion and required order for quetiapine which is being continued here and a kitchen was placed presumed due to immobility but no records found today regarding the kitchen. His lab workup was ok other than mild elevation of creatinine and cervical xray showed degenerative changes. The plan is for rehab here and return home with his wife.     Past Medical History: afib, CAD, HTN, neuralgia, shingles, hypercholestermia     Chief Complaint: “I guess I hurt everywhere. I dont remember falling yesterday\"    Review of Systems   General: feels fine today, no fever or chills, thinks he slept ok last night but admits memory is poor; appetite is good  Skin: aware of bruising on right side of body; no skin lesions  EENT: vision and hearing adequate; denies trouble chewing or swallowing  Respiratory: no cough, congestion or shortness of breath  Cardiac: no chest pain or palpitations  Gastrointestinal: no abdominal pain, nausea, diarrhea or constipation; BM every day without straining; no rectal pain or bleeding; no hemorrhoids  Genitourinary: aware he has a kitchen, denies discomfort  Musculoskeletal: no pain at rest but once he moved a little he said he had pain \"everywhere\"  Neurologic: denies hx of confusion but says he is confused now, not knowing where he is or recalling falling yesterday and not aware of his current fractures; no headaches, dizziness or lightheadedness; no tremors or involuntary movements; no altered sensation; no unilateral weakness  Psychiatric: aware of confusion; " willing to participate in therapy    Physical Examination   Vitals: Blood pressure 148/88, pulse 86, temperature 36.1 °C (96.9 °F), resp. rate 16, weight 64.9 kg (143 lb), SpO2 97%.  General: resting in bed, on back, head down, seemed comfortable until I raised his head a little for a drink then he c/o pain; he is bruised on his entire right arm and hip; he is pleasant but confused and not aware that he is in rehab or of the nonweight bearing  Neurologic: alert, oriented x1 currently;  speech clear and fluent; facial features symmetrical and tongue midline; MOTA equal but right side limited due to pain  Skin: no abnormalities of hair or nails; right side of body with a lot of bruising; no altered skin  HEENT: no trauma, bruises, swelling; adequate hearing of close conversation; oral mucosa moist, pink  Respiratory: unlabored; chest symmetrical w/ good rise and fall; no coughing, breath sounds equal and clear throughout  Heart: regular rhythm, rate controlled; normal heart sounds w/ no murmurs, rubs and gallops  Abdomen: nondistended, nontenderness, bowel sounds active x4  Musculoskeletal: did not assess ROM but he is able to MOTA  PV: skin warm, dry, no cyanosis, no clubbing of nails; no edema; palpable radial and pedal pulses  : kitchen with clear pale yellow urine, moderate amount  Psych: pleasant and cooperative but poor insight    Diagnoses and Plan:   Fall without injury (yesterday)- right proximal humeral fracture stable on xray today; nursing precautions in place to prevent falls  Acute pain - getting tylenol routinely; will have to see how the tolerates therapy today; also getting lidocaine patch to right shoulder and right hip  Right proximal humerus fracture, right periprosthetic hip fracture  Weight bearing: Nonweightbearing in shoulder immobilizer to right upper extremity, toe-touch weightbearing to right lower extremity with no active abduction for 6 weeks; he was refusing to wear the arm sling but PT  will be in today to assess and discuss with him  -follow up appointment with Derian BARNETT in 2 weeks   Acute delirium - started in the hospital; on minimal medications now; seroquel at bedtime; will see how he does today with interactions with therapy; no acute findings other than hospital psychosis; no known hx dementia  Presence of kitchen catheter - not clear if retention but could not find it mentioned; will reassess in few days. So far tolerating kitchen  Chronic afib and htn - both controlled; on metoprolol and eliqius   Will be seen for initial assessment by Dr Hernandez tomorrow.

## 2024-12-10 NOTE — ASSESSMENT & PLAN NOTE
- still present  - continue prn seroquel 12.5mg daily  - dc scheduled seroquel 12.5mg nightly  - start melatonin 3mg nightly  - labs unremarkable except for vit d  - no baseline cognitive issues per wife.

## 2024-12-10 NOTE — ASSESSMENT & PLAN NOTE
- seen by nephro inpatient who noted that cr was 1.2 earlier this year but currently lingering around 1.6. No further recommendations by nephro except avoid dehydration.

## 2024-12-10 NOTE — ASSESSMENT & PLAN NOTE
- discussed with wife about voiding trial but she is hesitant right now due to pt's limited mobility. Advised that we can have urinal within reach but wife declines at this time. She is made aware that having a kitchen can increase UTI risks.   - will dc kitchen once able to have full WB on RLE

## 2024-12-10 NOTE — PROGRESS NOTES
Division: Geriatrics  Date of Service: 12/10/2024  Visit Type: Skilled Rehabilitation Unit Admission History and Physical    Reason for admission to [facility] for post-acute rehab     Chief complaint: Debility    Admitted from: home  Facility: [facility]  Dates of hospitalization: 12/4/2024-12/7/2024  Hospital Course:  Mr. Raúl Garcia 79 y.o. year old male was admitted for fall at a restaurant.  *DC summary not available at this time.     Per SNF Nps note:   R femur periprosthetic greater trochanter fx, R humeral head fx; metabolic acidosis, leukocytosis. He was stabilized and it was ordered by ortho to be nonweight bearing to his arm and TTWB right leg until a follow up visit. While in [facility] he did have acute confusion and required order for quetiapine which is being continued here and a kitchen was placed presumed due to immobility but no records found today regarding the kitchen. His lab workup was ok other than mild elevation of creatinine and cervical xray showed degenerative changes. The plan is for rehab here and return home with his wife.     The pt was then transferred to [facility] for skilled rehab on 12/7/2024    Subjective   History obtained from patient,  facility staff (nurse, aid, PT/OT/SLP, SW and/or DON), HCPOA, medical records, and Geriatrics care team. Collateral history obtained due to delirium.    HPI    Weekend events when I was on call:   12/8/2024  Got paged an hour ago for unwitnessed fall with possible acute pain.   Pt is a recent admission to [facility] 2/2 Closed fx of R periprosthetic hip and R humeral fx, TTWB RLE, NWB RUE. Has a kitchen.     Per nurse, pt seemed to got out of w/c on his own to bed. Found on the floor. VS which are remarkable 150s/90s  RR 22, not in distress. Pt confused since admission, refuses to take tylenol (only pain med he has), unable to verbalize needs, unsure if w worsened pain since the fall. Noncompliant with R  shoulder immobilization.     Reviewed hospitalization: pt was delirious trying to get out of bed and actually had a fall. Found ton have acute urinary retention s/p kitchen. Primary worried about side effects from opioids. Pt needed haldol IV, ativan, seroquel to get CT head which didn't show acute findings. At one point, required wrist restraints.      Orders given to the nurse:   - in-house xray of R hip and R shoulder vs ER eval for faster work up>>deferred decision to wife who stated ok to do in-house xrays instead.   - CMP, CBC, TSH, B12 and D 25 OH tomorrow  - change prn seroquel to 12.5mg nightly and seroquel 12.5mg daily PRN agitation  - may take meds crushed in applesauce  - address pain by encouraging to take tylenol, add lidocaine patch to R shoulder and R hip.   Nurse verbalized understanding.     Repeat xrays were unremarkable. I personally reviewed them.     Wife at bedside. Still delirious per wife. Per nurse, noncompliant with taking meds and wearing shoulder immobilizer. Pt denies pain today. Wife thinks that percocet contributed to the confusion. She feels that pain is controlled w just tylenol as pt has high tolerance.     Remains to have kicthen.    Last recorded BM was yesterday.     Pt inattentive, has poor recall of the events and so unable to provide much hx.     Per wife, afib has been ruled out before and so OAC was stopped and pt is not taking it outpatient.    Medical Hx reviewed. See below.     PCP: Pavel Irizarry MD    Past Medical History:   Diagnosis Date    Impacted cerumen, bilateral 12/07/2016    Bilateral hearing loss due to cerumen impaction    Other conditions influencing health status 01/20/2014    Blockage of ear    Personal history of colonic polyps 05/13/2020    History of adenomatous polyp of colon    Personal history of other diseases of the musculoskeletal system and connective tissue     Personal history of arthritis    Personal history of other diseases of the nervous  system and sense organs     History of cataract   -Also has a history of CAD, shingles, HLP, hypertension, neuralgia  Past Surgical History:   Procedure Laterality Date    CATARACT EXTRACTION  02/19/2014    Cataract Surgery    OTHER SURGICAL HISTORY  01/20/2014    Leg Repair    OTHER SURGICAL HISTORY  05/08/2019    Hip replacement    TOTAL HIP ARTHROPLASTY  04/12/2017    Hip Replacement     No family history on file.  Social History     Tobacco Use    Smoking status: Never     Passive exposure: Never    Smokeless tobacco: Never   Substance Use Topics    Alcohol use: Yes     Alcohol/week: 14.0 standard drinks of alcohol     Types: 14 Standard drinks or equivalent per week     Comment: Occasionally       Allergies: Amlodipine, Ibuprofen, and Penicillins      Living environment prior to admission:   Per in-house therapy:   HOME LIVING  Patient Lives With: Spouse  Assistance Available: PRN  Entry To Home: Stairs, With Rail  Number Of Stairs Into Home: 3  Number Of Stairs To Bed/Bath: 0  Tub/Shower Type: walk-in shower  Laundry: wife does  Equipment Owned: Grab Bars- Shower, Shower Chair, Cane, Walker- Wheeled    PRIOR FUNCTIONAL LEVEL  Required Assistance  Assistance Required With: Cleaning, Laundry, Meals, Shopping, Transportation   per pt - IND with cane for ambulation; IND w ADL; assist with IADL prn from spouse.     Advanced Directives on file: <no information>    Medications reviewed and reconciled.   Current Outpatient Medications   Medication Sig Dispense Refill    acetaminophen (Tylenol) 500 mg tablet Take 2 tablets (1,000 mg) by mouth 3 times a day.      apixaban (Eliquis) 5 mg tablet Take 0.5 tablets (2.5 mg) by mouth 2 times a day. 90 tablet 3    atorvastatin (Lipitor) 20 mg tablet Take 1 tablet (20 mg) by mouth once daily. 90 tablet 3    [START ON 12/11/2024] ergocalciferol (Vitamin D-2) 1.25 MG (89737 UT) capsule Take 1 capsule (1,250 mcg) by mouth 1 (one) time per week. Do not fill before December 11,  2024.      famciclovir (Famvir) 125 mg tablet Take 1 tablet (125 mg) by mouth once daily.      hypromellose (Vista Gonio) 2.5 % ophthalmic solution Administer 1 drop into both eyes 4 times a day as needed for dry eyes.      lidocaine (Lidocare) 4 % patch Place 1 patch on the skin once daily. Remove & discard patch within 12 hours or as directed by MD. on the right hip and right shoulder      melatonin 3 mg tablet Take 1 tablet (3 mg) by mouth once daily at bedtime.      metoprolol succinate XL (Toprol-XL) 25 mg 24 hr tablet Take 1 tablet (25 mg) by mouth once daily. Do not crush or chew. 30 tablet 11    potassium chloride CR 20 mEq ER tablet Take 1 tablet (20 mEq) by mouth once daily. Do not crush or chew.      QUEtiapine (SEROquel) 25 mg tablet Take 0.5 tablets (12.5 mg) by mouth once daily as needed.       No current facility-administered medications for this visit.       Objective   BP (!) 131/95   Pulse 91   Temp 36.8 °C (98.2 °F)   Resp 20   Wt 65 kg (143 lb 3.2 oz)   SpO2 98%   BMI 23.11 kg/m²   Physical Exam  Vitals and nursing note reviewed.   Constitutional:       General: He is not in acute distress.     Appearance: Normal appearance. He is not ill-appearing.   HENT:      Head: Normocephalic and atraumatic.      Right Ear: External ear normal.      Left Ear: External ear normal.      Nose: Nose normal.      Mouth/Throat:      Mouth: Mucous membranes are moist.      Pharynx: Oropharynx is clear.   Eyes:      Extraocular Movements: Extraocular movements intact.      Conjunctiva/sclera: Conjunctivae normal.      Pupils: Pupils are equal, round, and reactive to light.   Cardiovascular:      Rate and Rhythm: Normal rate and regular rhythm.      Pulses: Normal pulses.      Heart sounds: Normal heart sounds. No murmur heard.  Pulmonary:      Effort: Pulmonary effort is normal.      Breath sounds: Normal breath sounds.   Abdominal:      General: Bowel sounds are normal.      Palpations: Abdomen is soft.  "  Musculoskeletal:         General: No swelling.      Cervical back: Normal range of motion.      Right lower leg: No edema.      Left lower leg: No edema.   Skin:     General: Skin is warm and dry.   Neurological:      General: No focal deficit present.      Mental Status: He is alert. He is disoriented.      Gait: Gait abnormal (sitting in w/c, able to move RLE).      Comments: Knows wife, self, thinks he is in the hospital. Oriented to month but not exact date and year.   Inattentive  Poor recall, poor insight and judgment  Follows commands.             Labs/Imaging reviewed.          Lab Results   Component Value Date    WBC 6.3 06/26/2024    HGB 11.5 (L) 06/26/2024    HCT 32.8 (L) 06/26/2024    MCV 88 06/26/2024     06/26/2024    LYMPHOPCT 25.1 06/26/2024    RBC 3.73 (L) 06/26/2024    MCH 30.8 06/26/2024    MCHC 35.1 06/26/2024    RDW 11.8 06/26/2024     Lab Results   Component Value Date     (L) 08/28/2024    K 4.6 08/28/2024    CL 99 08/28/2024    CO2 25 08/28/2024    BUN 18 08/28/2024    CREATININE 1.34 (H) 08/28/2024    GLUCOSE 94 08/28/2024    CALCIUM 9.1 08/28/2024    PROT 6.8 05/20/2024    BILITOT 0.9 05/20/2024    ALKPHOS 118 05/20/2024    AST 16 05/20/2024    ALT 13 05/20/2024     No results found for: \"TSH\"  No results found for: \"FREET4\"  Lab Results   Component Value Date    BXUEPAIN56 470 05/12/2021     Lab Results   Component Value Date    HGBA1C 5.5 12/05/2024    HGBA1C 5.7 (A) 05/16/2022    HGBA1C 6.2 05/12/2021     No results found for: \"VITD25\"     No results found for this or any previous visit from the past 365 days.       CT head wo IV contrast 12/06/2024    Narrative  * * *Final Report* * *    DATE OF EXAM: Dec  6 2024  6:17AM    [facility]   0504  -  CT BRAIN WO IVCON   / ACCESSION #  [id number]    PROCEDURE REASON: Mental status change, unknown cause    * * * * Physician Interpretation * * * *    RESULT: EXAMINATION: CT BRAIN WO JOSHUA, CT CERVICAL SPINE WO JOSHUA    CLINICAL HISTORY: " Neck trauma.  Mental status change, unknown cause    TECHNIQUE:  Serial axial images without IV contrast were obtained from  the vertex to the foramen magnum. Axial images were obtained of the  cervical spine without intravenous contrast. Reformatted sagittal and  coronal images were provided for review.      CT Radiation dose: Integrated Dose-Length Product (DLP) for this visit =  1189  mGy*cm  CT Dose Reduction Employed: Automated exposure control(AEC) and iterative  recon    COMPARISON: CT brain 06/11/2024.    RESULT:     (topogram) images: No additional findings.    CT head:    Evaluation is partially obscured with only axial images provided.  There  is additional mild motion artifact.    Stable confluent areas of subcortical and periventricular white matter  low attenuation are seen which are nonspecific but consistent with  ischemic change from severe small vessel disease.  There is no evidence  of a mass lesion, hemorrhage, acute infarction or midline shift.  The  gray-white matter differentiation is preserved.  There is stable  prominence of the ventricles and cerebral sulci in keeping with mild  cerebral atrophy.  No extra-axial fluid collections are present.  The  basal cisterns are patent.    The calvarium is intact.  Complete opacification of the included right  maxillary sinus and patchy opacification of the bilateral ethmoid air  cells is nonspecific but stable.  The mastoid air cells are clear.    Reformatted images support the above findings.    CT cervical spine:    No acute fracture is identified. Normal alignment without subluxation.  Normal alignment of facet joints.    The prevertebral soft tissues are not thickened. Predental space is not  widened. The relationship of the dens and atlas is normal.  Mild to  moderate degenerative changes between the anterior to C1 and the dens.    Moderate degenerative projecting endplate osteophytes, some of which are  bridging, in the mid to lower  cervical spine.  Mild disc height loss  throughout the cervical spine.  Moderate to severe facet arthropathy at  C4-5 and C5-6 on the right.  Mild facet arthropathy scattered elsewhere  in the cervical and included upper thoracic spine.    Assessment of the soft tissues of the neck shows no obvious localizing  soft tissue edema for hematoma formation. No worrisome lymphadenopathy.    Mild to moderate degenerative changes of the left glenohumeral joint with  decrease in the acromiohumeral distance from rotator cuff tendon tear,  likely chronic.    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  Mild to moderate  calcification of the carotid bulbs.    Impression:    1. No acute intracranial abnormality.    2. No evidence of acute osseous abnormality of the cervical spine.    3. Mild to moderate multifactorial degenerative changes of the cervical  spine.    4. Complete opacification of the included right maxillary sinus with  patchy opacification of the bilateral ethmoid air cells is stable and  nonspecific.  Correlate for sinus disease.        Transcribed Using Voice Recognition  Transcribe Date/Time: Dec  6 2024  6:20A    Dictated by: KENYETTA SAVAGE MD    This examination was interpreted and the report reviewed and  electronically signed by:  KENYETTA SAVAGE MD on Dec  6 2024  6:37AM  EST      CT head wo IV contrast 06/11/2024    Narrative  * * *Final Report* * *    DATE OF EXAM: Jun 11 2024  9:05PM    [facility]   0504  -  CT BRAIN WO IVCON   / ACCESSION #  [id number]    PROCEDURE REASON: Syncope, simple, abnormal neuro exam    * * * * Physician Interpretation * * * *    RESULT: EXAMINATION:  CT BRAIN WITHOUT CONTRAST    CLINICAL HISTORY:  Syncope, simple, abnormal neuro exam    TECHNIQUE:  Routine CT scan of the brain without contrast.  Serial axial  unenhanced images were obtained from the  vertex to the foramen magnum.    CT Dose-Length Product (DLP): 1072  mGy*cm  CT Dose Reduction Employed:  Iterative recon and mAs-kVp adjusted using  patient size-age    COMPARISON:  None.      RESULT:    Hemorrhage:    No evidence of acute intracranial hemorrhage.    Mass Effect / Mass Lesion:    There is no evidence of an intracranial  mass or extraaxial fluid collection.  No significant mass effect.    Chronic change:    Patchy foci of  low attenuation coefficient are  present within the supratentorial white matter which is a nonspecific  finding but likely represents microvascular ischemia.    Parenchyma:  There is mild generalized volume loss.    Ventricles:     The ventricles are within normal limits of size and  configuration for age.    Other:  There is complete opacification of the right maxillary sinus.  The remainder of the visualized paranasal sinuses are grossly clear.  The  skull and visualized extracranial soft tissues are grossly normal.    Impression  IMPRESSION:    No CT evidence of acute intracranial abnormality.    Right maxillary sinus disease.        Transcribed Using Voice Recognition  Transcribe Date/Time: Jun 11 2024  9:23P    Dictated by: DAVIN SANDHU MD    This examination was interpreted and the report reviewed and  electronically signed by:  DAVIN SANDHU MD on Jun 11 2024  9:26PM  EST     No results found for this or any previous visit from the past 365 days.      Assessment/Plan    Problem List Items Addressed This Visit             ICD-10-CM       Advance Directives and General Issues    Advance care planning Z71.89     - wife reports she is the HCPOA. Pt full code            Endocrine/Metabolic    Vitamin D deficiency E55.9     - replace with high dose D x 8 weeks then recheck level            Genitourinary and Reproductive    Urinary retention R33.9     - discussed with wife about voiding trial but she is hesitant right now due to pt's limited mobility. Advised that we can have urinal within reach but wife declines at this time. She is made aware that having a kitchen can increase UTI  risks.   - will dc kitchen once able to have full WB on RLE         Kitchen catheter in place Z97.8    Hypokalemia E87.6     -chronic per wife.   - start potassium chloride 20meq daily. Bmp, mg and cbc on 12/16         Stage 3 chronic kidney disease (Multi) N18.30     - seen by nephro inpatient who noted that cr was 1.2 earlier this year but currently lingering around 1.6. No further recommendations by nephro except avoid dehydration.            Musculoskeletal and Injuries    Periprosthetic fracture of hip M97.8XXA, Z96.649     - wife wants to avoid opioid for pt. Continue tylenol 1g TID and also lidocaine patch to R shoulder and R hip  - wife wants pt to f/up w original ortho surgeon. She will call for appt with Dr. Lechuga for pt to have a 2 week f/up. Discussed w her that Xrays can be done in-house and xray CDs sent to ortho.  - TTWB RLE x 6 weeks  - DVT prophylaxis with eliquis til 12/21 (14 d course)         Age-related osteoporosis with current pathological fracture M80.00XA     - hx also of L hip fx early this year.   - no tx before for osteoporosis.   - will need tx outpatient but first Vit D needs replaced.         Closed fracture of proximal end of right humerus with routine healing S42.201D     - noncompliant with NWB and immobilizer which is recommended x 6 weeks.             Neuro    Delirium R41.0     - still present  - continue prn seroquel 12.5mg daily  - dc scheduled seroquel 12.5mg nightly  - start melatonin 3mg nightly  - labs unremarkable except for vit d  - no baseline cognitive issues per wife.            Symptoms and Signs    Debility - Primary R53.81     - to start Pt/OT. Progress will be prolonged d/t TTWB RLE x 6 weeks.  - goal is for pt to go back home with wife. Baseline mobility: uses a cane.               Discussed case with: patient,  facility staff (nurse, aid, PT/OT/SLP, SW and/or DON), family member, and Geriatrics care team    Electronically signed by: Kayla Hernandez MD

## 2024-12-10 NOTE — ASSESSMENT & PLAN NOTE
- wife wants to avoid opioid for pt. Continue tylenol 1g TID and also lidocaine patch to R shoulder and R hip  - wife wants pt to f/up w original ortho surgeon. She will call for appt with Dr. Lechuga for pt to have a 2 week f/up. Discussed w her that Xrays can be done in-house and xray CDs sent to ortho.  - TTWB RLE x 6 weeks  - DVT prophylaxis with eliquis til 12/21 (14 d course)

## 2024-12-11 ENCOUNTER — APPOINTMENT (OUTPATIENT)
Dept: CARDIOLOGY | Facility: CLINIC | Age: 79
End: 2024-12-11
Payer: MEDICARE

## 2024-12-12 ENCOUNTER — NURSING HOME VISIT (OUTPATIENT)
Dept: POST ACUTE CARE | Facility: EXTERNAL LOCATION | Age: 79
End: 2024-12-12
Payer: MEDICARE

## 2024-12-12 VITALS
SYSTOLIC BLOOD PRESSURE: 135 MMHG | RESPIRATION RATE: 18 BRPM | BODY MASS INDEX: 23.08 KG/M2 | DIASTOLIC BLOOD PRESSURE: 77 MMHG | OXYGEN SATURATION: 98 % | WEIGHT: 143 LBS | HEART RATE: 82 BPM | TEMPERATURE: 97.2 F

## 2024-12-12 DIAGNOSIS — Z96.0 PRESENCE OF INDWELLING URINARY CATHETER: ICD-10-CM

## 2024-12-12 DIAGNOSIS — I48.0 PAROXYSMAL ATRIAL FIBRILLATION (MULTI): ICD-10-CM

## 2024-12-12 DIAGNOSIS — G89.11 ACUTE PAIN DUE TO TRAUMA: Primary | ICD-10-CM

## 2024-12-12 PROCEDURE — 99310 SBSQ NF CARE HIGH MDM 45: CPT | Performed by: CLINICAL NURSE SPECIALIST

## 2024-12-12 NOTE — LETTER
"Patient: Raúl Garcia  : 1945    Encounter Date: 2024    Reason for visit: follow up SNF/rehab visit    HPI: Raúl was seen today in his room at [facility]; he has been here about a week now and remains with some confusion. He is cooperating with therapy but has some trouble cooperating with nursing staff and complying with orders. The nurse reported to me today that he again pulled his kitchen out and refused to take his medications.    Chief Complaint: “I have to use the bathroom\"    Review of Systems   General: frustrated over needing to get on the toilet and not able to do so; says he will take his meds if he knows what he is getting; feels rested, can't recall how he slept; can't recall why he is here but knows he is in a rehab; does not feel illl, no fever or chills  Skin: no lesions; aware of right arm bruising, no other skin issues  EENT:  vision and hearing are fine; no soreness of throat; no trouble chewing or swallowing  Respiratory: no cough, congestion or shortness of breath  Cardiac: no chest pain or palpitations  Gastrointestinal: no abdominal pain, nausea, vomiting, heartburn, diarrhea or constipation; BM every day without straining; no rectal pain or bleeding; no hemorrhoids  Genitourinary: continent; no discomfort with urination; no frequency, urgency; normal color of urine; no discharge or itching  Musculoskeletal: legs are weak, entire right arm sore except hand; right hip sore; no limitation of ROM of neck/back/left side; normally walks independently without assistive device but says he can't get up on his own now  Neurologic: feels his long and short term memory intact; no headaches, dizziness or lightheadedness; no tremors or involuntary movements; no altered sensation; right arm and leg weaker  Psychiatric: frustrated with staff and situation    Physical Examination   Vitals: Blood pressure 135/77, pulse 82, temperature 36.2 °C (97.2 °F), resp. rate 18, weight 64.9 kg (143 lb), " SpO2 98%.  General: was in bathroom when I arrived, backed up to the toilet trying to maneuver to get onto the toilet; after talking with him and explaining what is going on, he was cooperative, took his meds, and allowed help to get on the toilet. Sat there for about 20min then allowed help with wiping and getting dressed and sat in wheelchair in room. We talked for about 20min or more and he told me about  his life. He has very little short term memory from one minute to the next regarding new information.  Neurologic: alert, oriented x2, aware he is in a nursing facility, aware of the month and year, but does not recall being in the hospital or falling and having fractures; very good long term memory; speech clear and fluent; facial features symmetrical and tongue midline; equal peripheral strength, tone and sensation  Skin: no abnormalities of hair or nails; no lesions, entire right arm from shoulder to hand is ecchymotic; right hip bruising  HEENT: no trauma, bruises, swelling; adequate hearing of close conversation; adequate vision for ADLs; no eye/ear/nasal drainage or irritation; oral mucosa moist and pink w/o lesions or discolorations; no coughing while swallowing  Respiratory: unlabored; chest symmetrical w/ good rise and fall; no coughing, breath sounds equal and clear throughout  Heart: regular rhythm, rate controlled; normal heart sounds w/ no murmurs, rubs and gallops  Abdomen: nondistended, nontenderness, bowel sounds active x4, no masses/hernias; continent of formed brown BM today  Musculoskeletal: right arm bruised and swollen and not in the sling, limited use but attempts to use it; right leg with decent movement but not able to put much wt on it due to pain it seems; adequate ROM of left arm and leg and neck, no kyphosis or scoliosis; able to sit upright unassisted  PV: skin warm, dry, no cyanosis, no clubbing of nails; right arm and right thigh swollen; palpable radial and pedal pulses  : fidelina  catheter intact but tubing was pulled off; I cleaned it thoroughly and reconnected it to a new bag, urine on the floor and some clear yellow drained into the new bag; no obvious trauma to penis  Psych: pleasant and cooperative once we spent some time together; good conversationalist; defensive at first    Reviewed labs - K 3.3, An 135, BUN 42, creatinine 1.1, gfr 42, albumin 2.9, hgb 11.5, vit d 15, tsh and b12 wnl    Diagnoses and Plan:   Urinary retention - keeping the kitchen for now; I think in a few days he may be able to get it out; will check back Monday  Closed fracture of proximal end of right humerus with routine healing / right bowen-orthotic femoral head fracture - needs a lot of reinforcement for the sling since he is trying to use his arm; he seems ok with only tylenol while I was working with him; he had pain when transferring but as soon as he sat he was ok; continue routine tylenol  Acute delirium with hx daily alcohol use - continue prn seroquel 12.5mg daily;  melatonin 3mg nightly; needs frequent checks for safety  4. Htn and ckd stage 3b- toprol; BP wnl; all meds renal appropriate; keep hydrated; unsure of cause of ckd  5. Hypokalemia - on KCL supplement for K 3.3  6. Paroxysmal afib - on toprol and eliquis; stable  7. Vitamin D deficiency - on D supplement      Electronically Signed By: EPI Robbins-CNS   12/15/24  8:53 AM

## 2024-12-13 NOTE — PROGRESS NOTES
"Reason for visit: follow up SNF/rehab visit    HPI: Raúl was seen today in his room at [facility]; he has been here about a week now and remains with some confusion. He is cooperating with therapy but has some trouble cooperating with nursing staff and complying with orders. The nurse reported to me today that he again pulled his kitchen out and refused to take his medications.    Chief Complaint: “I have to use the bathroom\"    Review of Systems   General: frustrated over needing to get on the toilet and not able to do so; says he will take his meds if he knows what he is getting; feels rested, can't recall how he slept; can't recall why he is here but knows he is in a rehab; does not feel illl, no fever or chills  Skin: no lesions; aware of right arm bruising, no other skin issues  EENT:  vision and hearing are fine; no soreness of throat; no trouble chewing or swallowing  Respiratory: no cough, congestion or shortness of breath  Cardiac: no chest pain or palpitations  Gastrointestinal: no abdominal pain, nausea, vomiting, heartburn, diarrhea or constipation; BM every day without straining; no rectal pain or bleeding; no hemorrhoids  Genitourinary: continent; no discomfort with urination; no frequency, urgency; normal color of urine; no discharge or itching  Musculoskeletal: legs are weak, entire right arm sore except hand; right hip sore; no limitation of ROM of neck/back/left side; normally walks independently without assistive device but says he can't get up on his own now  Neurologic: feels his long and short term memory intact; no headaches, dizziness or lightheadedness; no tremors or involuntary movements; no altered sensation; right arm and leg weaker  Psychiatric: frustrated with staff and situation    Physical Examination   Vitals: Blood pressure 135/77, pulse 82, temperature 36.2 °C (97.2 °F), resp. rate 18, weight 64.9 kg (143 lb), SpO2 98%.  General: was in bathroom when I arrived, backed up to the " toilet trying to maneuver to get onto the toilet; after talking with him and explaining what is going on, he was cooperative, took his meds, and allowed help to get on the toilet. Sat there for about 20min then allowed help with wiping and getting dressed and sat in wheelchair in room. We talked for about 20min or more and he told me about  his life. He has very little short term memory from one minute to the next regarding new information.  Neurologic: alert, oriented x2, aware he is in a nursing facility, aware of the month and year, but does not recall being in the hospital or falling and having fractures; very good long term memory; speech clear and fluent; facial features symmetrical and tongue midline; equal peripheral strength, tone and sensation  Skin: no abnormalities of hair or nails; no lesions, entire right arm from shoulder to hand is ecchymotic; right hip bruising  HEENT: no trauma, bruises, swelling; adequate hearing of close conversation; adequate vision for ADLs; no eye/ear/nasal drainage or irritation; oral mucosa moist and pink w/o lesions or discolorations; no coughing while swallowing  Respiratory: unlabored; chest symmetrical w/ good rise and fall; no coughing, breath sounds equal and clear throughout  Heart: regular rhythm, rate controlled; normal heart sounds w/ no murmurs, rubs and gallops  Abdomen: nondistended, nontenderness, bowel sounds active x4, no masses/hernias; continent of formed brown BM today  Musculoskeletal: right arm bruised and swollen and not in the sling, limited use but attempts to use it; right leg with decent movement but not able to put much wt on it due to pain it seems; adequate ROM of left arm and leg and neck, no kyphosis or scoliosis; able to sit upright unassisted  PV: skin warm, dry, no cyanosis, no clubbing of nails; right arm and right thigh swollen; palpable radial and pedal pulses  : kitchen catheter intact but tubing was pulled off; I cleaned it thoroughly  and reconnected it to a new bag, urine on the floor and some clear yellow drained into the new bag; no obvious trauma to penis  Psych: pleasant and cooperative once we spent some time together; good conversationalist; defensive at first    Reviewed labs - K 3.3, An 135, BUN 42, creatinine 1.1, gfr 42, albumin 2.9, hgb 11.5, vit d 15, tsh and b12 wnl    Diagnoses and Plan:   Urinary retention - keeping the kitchen for now; I think in a few days he may be able to get it out; will check back Monday  Closed fracture of proximal end of right humerus with routine healing / right bowen-orthotic femoral head fracture - needs a lot of reinforcement for the sling since he is trying to use his arm; he seems ok with only tylenol while I was working with him; he had pain when transferring but as soon as he sat he was ok; continue routine tylenol  Acute delirium with hx daily alcohol use - continue prn seroquel 12.5mg daily;  melatonin 3mg nightly; needs frequent checks for safety  4. Htn and ckd stage 3b- toprol; BP wnl; all meds renal appropriate; keep hydrated; unsure of cause of ckd  5. Hypokalemia - on KCL supplement for K 3.3  6. Paroxysmal afib - on toprol and eliquis; stable  7. Vitamin D deficiency - on D supplement

## 2024-12-16 ENCOUNTER — NURSING HOME VISIT (OUTPATIENT)
Dept: POST ACUTE CARE | Facility: EXTERNAL LOCATION | Age: 79
End: 2024-12-16
Payer: MEDICARE

## 2024-12-16 DIAGNOSIS — I48.0 PAROXYSMAL ATRIAL FIBRILLATION (MULTI): ICD-10-CM

## 2024-12-16 DIAGNOSIS — M97.8XXD PERIPROSTHETIC FRACTURE OF HIP, SUBSEQUENT ENCOUNTER: ICD-10-CM

## 2024-12-16 DIAGNOSIS — Z96.649 PERIPROSTHETIC FRACTURE OF HIP, SUBSEQUENT ENCOUNTER: ICD-10-CM

## 2024-12-16 DIAGNOSIS — I10 BENIGN ESSENTIAL HYPERTENSION: ICD-10-CM

## 2024-12-16 DIAGNOSIS — R53.81 DEBILITY: Primary | ICD-10-CM

## 2024-12-16 PROCEDURE — 99309 SBSQ NF CARE MODERATE MDM 30: CPT | Performed by: CLINICAL NURSE SPECIALIST

## 2024-12-16 NOTE — LETTER
"Patient: Raúl Garcia  : 1945    Encounter Date: 2024    Reason for visit: follow up SNF/rehab visit    HPI: Raúl was seen today in his room at [facility]; he has been cooperating with therapy but still not wearing the arm sling and being oppositional with nursing. He has been getting the seroquel most nights due to his agitation. Today he is seeing a psychiatrist or possibly a neuropsychiatrist but the nurse did not know more details. Over the weekend     Chief Complaint: “I can't do anything if I dont use my right arm\"    Review of Systems   General: no fever or chills, does not feel ill; frustrated about having trouble with ADLs; doesn't like to ask for help; feels he is sleeping some at night but not solid; not tired during the day; tells me he has an appt with a psychiatrist today  Skin: no lesions; aware of right arm bruising, no other skin issues  EENT:  vision and hearing are fine; no soreness of throat; no trouble chewing or swallowing  Respiratory: no cough, congestion or shortness of breath  Cardiac: no chest pain or palpitations  Gastrointestinal: no abdominal pain, nausea, vomiting, heartburn, diarrhea or constipation; BM every day without straining; no rectal pain or bleeding; no hemorrhoids  Genitourinary: kitchen is not bothersome, would rather leave it in  Musculoskeletal: legs are weak, entire right arm sore except hand; right hip sore; no limitation of ROM of neck/back/left side; nonwt bearing right arm but is using it; standing with PT and to transfer and uises right leg  Neurologic: feels his long and short term memory intact; no headaches, dizziness or lightheadedness; no tremors or involuntary movements; no altered sensation; right arm and leg weaker  Psychiatric: frustrated with situation    Physical Examination   Vitals:   General: was in bathroom when I arrived, he was holding his toiletries bag in both hands and groaning when he had to throw it back on the counter  Neurologic: " alert, oriented x3, really mixed up about his situation; good long term memory; speech clear and fluent; facial features symmetrical and tongue midline; equal peripheral strength, tone and sensation  Skin: no abnormalities of hair or nails; no lesions, entire right arm from shoulder to hand is ecchymotic but fading; right hip bruising  HEENT: no trauma, bruises, swelling; adequate hearing of close conversation; adequate vision for ADLs; no eye/ear/nasal drainage or irritation; oral mucosa moist and pink w/o lesions or discolorations; no coughing while swallowing  Respiratory: unlabored; chest symmetrical w/ good rise and fall; no coughing, breath sounds equal and clear throughout  Heart: irregular rhythm, rate controlled; normal heart sounds w/ no murmurs, rubs and gallops  Abdomen: nondistended, nontenderness, bowel sounds active x4, no masses/hernias  Musculoskeletal: right arm bruised and swollen and not in the sling, limited use but attempts to use it; right leg with decent movement but not able to put much wt on it due to pain it seems; adequate ROM of left arm and leg and neck, no kyphosis or scoliosis; able to sit upright unassisted  PV: skin warm, dry, no cyanosis, no clubbing of nails; right arm and right thigh swollen; palpable radial and pedal pulses  : kitchen catheter intact with moderate amount clear yellow   Psych: pleasant and cooperative but a little oppositional, not wanting to follow dr recommendations, not being able to see that if he can't do something, that someone else will do it for him;     Diagnoses and Plan:   Urinary retention - wanted to remove kitchen today but nurse said their protocol is to remove at 6a and do a bladder scan 6hr later; will have it removed tomorrow  Closed fracture of proximal end of right humerus with routine healing / right bowen-orthotic femoral head fracture - needs a lot of reinforcement for the sling since he is trying to use his arm; he seems ok with only tylenol  while I was working with him; continue routine tylenol  Acute delirium with hx daily alcohol use - despite stopping the routine seroquel he has been getting it; would like to see how he does on a nighjt when he does not get it; will stop the prn for now and can add it back; melatonin 3mg nightly; needs frequent checks for safety; supposedly he is seeing a psychiatrist today so may come back with new orders. Really suspect some underlying dementia. Would recommend tid thiamine for cognition.   4. Htn and ckd stage 3b- toprol; BP wnl; all meds renal appropriate; keep do not want to have hydrated; unsure of cause of ckd  5. Hypokalemia - on KCL supplement for K 3.3  6. Paroxysmal afib - on toprol and eliquis; stable  7. Vitamin D deficiency - on D supplement    Nurse to call tomorrow if high post void residual.       Electronically Signed By: ROHINI Robbins   12/16/24  9:22 PM

## 2024-12-16 NOTE — PROGRESS NOTES
"Reason for visit: follow up SNF/rehab visit    HPI: Raúl was seen today in his room at [facility]; he has been cooperating with therapy but still not wearing the arm sling and being oppositional with nursing. He has been getting the seroquel most nights due to his agitation. Today he is seeing a psychiatrist or possibly a neuropsychiatrist but the nurse did not know more details. Over the weekend     Chief Complaint: “I can't do anything if I dont use my right arm\"    Review of Systems   General: no fever or chills, does not feel ill; frustrated about having trouble with ADLs; doesn't like to ask for help; feels he is sleeping some at night but not solid; not tired during the day; tells me he has an appt with a psychiatrist today  Skin: no lesions; aware of right arm bruising, no other skin issues  EENT:  vision and hearing are fine; no soreness of throat; no trouble chewing or swallowing  Respiratory: no cough, congestion or shortness of breath  Cardiac: no chest pain or palpitations  Gastrointestinal: no abdominal pain, nausea, vomiting, heartburn, diarrhea or constipation; BM every day without straining; no rectal pain or bleeding; no hemorrhoids  Genitourinary: kitchen is not bothersome, would rather leave it in  Musculoskeletal: legs are weak, entire right arm sore except hand; right hip sore; no limitation of ROM of neck/back/left side; nonwt bearing right arm but is using it; standing with PT and to transfer and uises right leg  Neurologic: feels his long and short term memory intact; no headaches, dizziness or lightheadedness; no tremors or involuntary movements; no altered sensation; right arm and leg weaker  Psychiatric: frustrated with situation    Physical Examination   Vitals:   General: was in bathroom when I arrived, he was holding his toiletries bag in both hands and groaning when he had to throw it back on the counter  Neurologic: alert, oriented x3, really mixed up about his situation; good long " term memory; speech clear and fluent; facial features symmetrical and tongue midline; equal peripheral strength, tone and sensation  Skin: no abnormalities of hair or nails; no lesions, entire right arm from shoulder to hand is ecchymotic but fading; right hip bruising  HEENT: no trauma, bruises, swelling; adequate hearing of close conversation; adequate vision for ADLs; no eye/ear/nasal drainage or irritation; oral mucosa moist and pink w/o lesions or discolorations; no coughing while swallowing  Respiratory: unlabored; chest symmetrical w/ good rise and fall; no coughing, breath sounds equal and clear throughout  Heart: irregular rhythm, rate controlled; normal heart sounds w/ no murmurs, rubs and gallops  Abdomen: nondistended, nontenderness, bowel sounds active x4, no masses/hernias  Musculoskeletal: right arm bruised and swollen and not in the sling, limited use but attempts to use it; right leg with decent movement but not able to put much wt on it due to pain it seems; adequate ROM of left arm and leg and neck, no kyphosis or scoliosis; able to sit upright unassisted  PV: skin warm, dry, no cyanosis, no clubbing of nails; right arm and right thigh swollen; palpable radial and pedal pulses  : kitchen catheter intact with moderate amount clear yellow   Psych: pleasant and cooperative but a little oppositional, not wanting to follow dr recommendations, not being able to see that if he can't do something, that someone else will do it for him;     Diagnoses and Plan:   Urinary retention - wanted to remove kitchen today but nurse said their protocol is to remove at 6a and do a bladder scan 6hr later; will have it removed tomorrow  Closed fracture of proximal end of right humerus with routine healing / right bowen-orthotic femoral head fracture - needs a lot of reinforcement for the sling since he is trying to use his arm; he seems ok with only tylenol while I was working with him; continue routine tylenol  Acute  delirium with hx daily alcohol use - despite stopping the routine seroquel he has been getting it; would like to see how he does on a nighjt when he does not get it; will stop the prn for now and can add it back; melatonin 3mg nightly; needs frequent checks for safety; supposedly he is seeing a psychiatrist today so may come back with new orders. Really suspect some underlying dementia. Would recommend tid thiamine for cognition.   4. Htn and ckd stage 3b- toprol; BP wnl; all meds renal appropriate; keep do not want to have hydrated; unsure of cause of ckd  5. Hypokalemia - on KCL supplement for K 3.3  6. Paroxysmal afib - on toprol and eliquis; stable  7. Vitamin D deficiency - on D supplement    Nurse to call tomorrow if high post void residual.

## 2024-12-19 ENCOUNTER — NURSING HOME VISIT (OUTPATIENT)
Dept: POST ACUTE CARE | Facility: EXTERNAL LOCATION | Age: 79
End: 2024-12-19
Payer: MEDICARE

## 2024-12-19 DIAGNOSIS — R33.9 URINARY RETENTION: ICD-10-CM

## 2024-12-19 DIAGNOSIS — R53.81 DEBILITY: Primary | ICD-10-CM

## 2024-12-19 DIAGNOSIS — S42.294D OTHER CLOSED NONDISPLACED FRACTURE OF PROXIMAL END OF RIGHT HUMERUS WITH ROUTINE HEALING, SUBSEQUENT ENCOUNTER: ICD-10-CM

## 2024-12-19 DIAGNOSIS — M97.8XXD PERIPROSTHETIC FRACTURE OF HIP, SUBSEQUENT ENCOUNTER: ICD-10-CM

## 2024-12-19 DIAGNOSIS — Z96.649 PERIPROSTHETIC FRACTURE OF HIP, SUBSEQUENT ENCOUNTER: ICD-10-CM

## 2024-12-19 DIAGNOSIS — R41.0 DELIRIUM: ICD-10-CM

## 2024-12-19 PROCEDURE — 99309 SBSQ NF CARE MODERATE MDM 30: CPT | Performed by: INTERNAL MEDICINE

## 2024-12-19 NOTE — LETTER
Patient: Raúl Garcia  : 1945    Encounter Date: 2024        Division: Geriatrics  Date of Service: 2024  Visit Type: Skilled Rehabilitation Unit Follow-up Visit    Chief complaint/Reason for follow-up: No chief complaint on file.    Subjective  Mr. Raúl Garcia 79 y.o. year old male is admitted to [facility] for skilled rehab since 2024 after hospitalization for right femur periprosthetic greater trochanter fracture, right humeral head fracture, metabolic acidosis, leukocytosis..     Interval Hx:  History obtained from patient,  facility staff (nurse, aid, PT/OT/SLP, SW and/or DON), family member, and Geriatrics care team. Collateral history obtained due to cognitive impairment    HPI  Per wife, cognition still not at baseline.  Per nursing, agitation/delirium seems to be improved.  Has been off Seroquel since .    Urinary retention: Lr catheter removed on .  Bladder scan that day was almost 400 cc but unknown if this is a PVR.  Bladder scan today showed to be 297 cc but unsure if this is a PVR.  Patient denies abdominal pain, almost 200 cc of urine was noted today.    No reports of uncontrolled acute pain.  Per therapy, patient is max to dependent with all ADLs.  Using slide board with moderate assist x 2 in therapy, sitting balance is fair.     Slums .  Has poor judgment and insight and safety awareness.      Medical Hx reviewed. See below.     Past Medical History:   Diagnosis Date   • Impacted cerumen, bilateral 2016    Bilateral hearing loss due to cerumen impaction   • Other conditions influencing health status 2014    Blockage of ear   • Personal history of colonic polyps 2020    History of adenomatous polyp of colon   • Personal history of other diseases of the musculoskeletal system and connective tissue     Personal history of arthritis   • Personal history of other diseases of the nervous system and sense organs     History of cataract      Past Surgical History:   Procedure Laterality Date   • CATARACT EXTRACTION  02/19/2014    Cataract Surgery   • OTHER SURGICAL HISTORY  01/20/2014    Leg Repair   • OTHER SURGICAL HISTORY  05/08/2019    Hip replacement   • TOTAL HIP ARTHROPLASTY  04/12/2017    Hip Replacement     No family history on file.  Social History     Tobacco Use   • Smoking status: Never     Passive exposure: Never   • Smokeless tobacco: Never   Substance Use Topics   • Alcohol use: Yes     Alcohol/week: 14.0 standard drinks of alcohol     Types: 14 Standard drinks or equivalent per week     Comment: Occasionally       Allergies: Amlodipine, Ibuprofen, and Penicillins      Medications reviewed and reconciled.   Current Outpatient Medications   Medication Sig Dispense Refill   • acetaminophen (Tylenol) 500 mg tablet Take 2 tablets (1,000 mg) by mouth 3 times a day.     • apixaban (Eliquis) 5 mg tablet Take 0.5 tablets (2.5 mg) by mouth 2 times a day. 90 tablet 3   • atorvastatin (Lipitor) 20 mg tablet Take 1 tablet (20 mg) by mouth once daily. 90 tablet 3   • ergocalciferol (Vitamin D-2) 1.25 MG (96611 UT) capsule Take 1 capsule (1,250 mcg) by mouth 1 (one) time per week. Do not fill before December 11, 2024.     • famciclovir (Famvir) 125 mg tablet Take 1 tablet (125 mg) by mouth once daily.     • hypromellose (Vista Gonio) 2.5 % ophthalmic solution Administer 1 drop into both eyes 4 times a day as needed for dry eyes.     • lidocaine (Lidocare) 4 % patch Place 1 patch on the skin once daily. Remove & discard patch within 12 hours or as directed by MD. on the right hip and right shoulder     • melatonin 3 mg tablet Take 1 tablet (3 mg) by mouth once daily at bedtime.     • metoprolol succinate XL (Toprol-XL) 25 mg 24 hr tablet Take 1 tablet (25 mg) by mouth once daily. Do not crush or chew. 30 tablet 11   • potassium chloride CR 20 mEq ER tablet Take 1 tablet (20 mEq) by mouth once daily. Do not crush or chew.       No current  facility-administered medications for this visit.       Objective  There were no vitals taken for this visit.  Physical Exam  Vitals and nursing note reviewed.   Constitutional:       General: He is not in acute distress.     Appearance: Normal appearance. He is not ill-appearing.   HENT:      Head: Normocephalic and atraumatic.      Right Ear: External ear normal.      Left Ear: External ear normal.      Nose: Nose normal.      Mouth/Throat:      Mouth: Mucous membranes are moist.      Pharynx: Oropharynx is clear.   Eyes:      Extraocular Movements: Extraocular movements intact.      Conjunctiva/sclera: Conjunctivae normal.      Pupils: Pupils are equal, round, and reactive to light.   Cardiovascular:      Rate and Rhythm: Normal rate and regular rhythm.      Pulses: Normal pulses.      Heart sounds: Normal heart sounds. No murmur heard.  Pulmonary:      Effort: Pulmonary effort is normal.      Breath sounds: Normal breath sounds.   Abdominal:      General: Bowel sounds are normal. There is no distension.      Palpations: Abdomen is soft.      Tenderness: There is no abdominal tenderness.   Genitourinary:     Comments: Lr catheter out  Musculoskeletal:         General: No swelling.      Cervical back: Normal range of motion.      Right lower leg: No edema.      Left lower leg: No edema.      Comments: Limited ROM of right shoulder with some bruising   Skin:     General: Skin is warm and dry.   Neurological:      General: No focal deficit present.      Mental Status: He is alert. He is disoriented.      Gait: Gait abnormal (sitting in w/c, able to move RLE).      Comments: Knows wife, self, thinks he is in the hospital. Oriented to month but not exact date and year.   Inattentive  Poor recall, poor insight and judgment  Follows commands.               Labs:  Most current labs reviewed: no recent labs ordered      Imaging:   Most current imaging studies reviewed: no recent imaging studies  ordered      Assessment/Plan   Problem List Items Addressed This Visit             ICD-10-CM       Genitourinary and Reproductive    Urinary retention R33.9       Musculoskeletal and Injuries    Periprosthetic fracture of hip M97.8XXA, Z96.649    Closed fracture of proximal end of right humerus with routine healing S42.201D       Neuro    Delirium R41.0       Symptoms and Signs    Debility - Primary R53.81     -Delirium improving but still present.  -To see orthopedic surgeon tomorrow  -Progress in therapy is limited by partial weightbearing status of right lower extremity, right humeral fracture and impaired cognition.  -Unsure if having urinary retention.  Though bladder for me today was not palpable.  Has renal function tomorrow.    Discussed case with: patient,  facility staff (nurse, aid, PT/OT/SLP, SW and/or DON), and Geriatrics care team    Electronically signed by: Kayla Hernandez MD      Electronically Signed By: Kayla Hernandez MD   12/20/24  1:40 PM

## 2024-12-20 NOTE — PROGRESS NOTES
Division: Geriatrics  Date of Service: 12/19/2024  Visit Type: Skilled Rehabilitation Unit Follow-up Visit    Chief complaint/Reason for follow-up: No chief complaint on file.    Subjective   Mr. Raúl Garcia 79 y.o. year old male is admitted to [facility] for skilled rehab since 12/7/2024 after hospitalization for right femur periprosthetic greater trochanter fracture, right humeral head fracture, metabolic acidosis, leukocytosis..     Interval Hx:  History obtained from patient,  facility staff (nurse, aid, PT/OT/SLP, SW and/or DON), family member, and Geriatrics care team. Collateral history obtained due to cognitive impairment    HPI  Per wife, cognition still not at baseline.  Per nursing, agitation/delirium seems to be improved.  Has been off Seroquel since 12/16.    Urinary retention: Lr catheter removed on 12/17.  Bladder scan that day was almost 400 cc but unknown if this is a PVR.  Bladder scan today showed to be 297 cc but unsure if this is a PVR.  Patient denies abdominal pain, almost 200 cc of urine was noted today.    No reports of uncontrolled acute pain.  Per therapy, patient is max to dependent with all ADLs.  Using slide board with moderate assist x 2 in therapy, sitting balance is fair.     Slums 11/30.  Has poor judgment and insight and safety awareness.      Medical Hx reviewed. See below.     Past Medical History:   Diagnosis Date    Impacted cerumen, bilateral 12/07/2016    Bilateral hearing loss due to cerumen impaction    Other conditions influencing health status 01/20/2014    Blockage of ear    Personal history of colonic polyps 05/13/2020    History of adenomatous polyp of colon    Personal history of other diseases of the musculoskeletal system and connective tissue     Personal history of arthritis    Personal history of other diseases of the nervous system and sense organs     History of cataract     Past Surgical History:   Procedure Laterality Date    CATARACT EXTRACTION   02/19/2014    Cataract Surgery    OTHER SURGICAL HISTORY  01/20/2014    Leg Repair    OTHER SURGICAL HISTORY  05/08/2019    Hip replacement    TOTAL HIP ARTHROPLASTY  04/12/2017    Hip Replacement     No family history on file.  Social History     Tobacco Use    Smoking status: Never     Passive exposure: Never    Smokeless tobacco: Never   Substance Use Topics    Alcohol use: Yes     Alcohol/week: 14.0 standard drinks of alcohol     Types: 14 Standard drinks or equivalent per week     Comment: Occasionally       Allergies: Amlodipine, Ibuprofen, and Penicillins      Medications reviewed and reconciled.   Current Outpatient Medications   Medication Sig Dispense Refill    acetaminophen (Tylenol) 500 mg tablet Take 2 tablets (1,000 mg) by mouth 3 times a day.      apixaban (Eliquis) 5 mg tablet Take 0.5 tablets (2.5 mg) by mouth 2 times a day. 90 tablet 3    atorvastatin (Lipitor) 20 mg tablet Take 1 tablet (20 mg) by mouth once daily. 90 tablet 3    ergocalciferol (Vitamin D-2) 1.25 MG (40660 UT) capsule Take 1 capsule (1,250 mcg) by mouth 1 (one) time per week. Do not fill before December 11, 2024.      famciclovir (Famvir) 125 mg tablet Take 1 tablet (125 mg) by mouth once daily.      hypromellose (Vista Gonio) 2.5 % ophthalmic solution Administer 1 drop into both eyes 4 times a day as needed for dry eyes.      lidocaine (Lidocare) 4 % patch Place 1 patch on the skin once daily. Remove & discard patch within 12 hours or as directed by MD. on the right hip and right shoulder      melatonin 3 mg tablet Take 1 tablet (3 mg) by mouth once daily at bedtime.      metoprolol succinate XL (Toprol-XL) 25 mg 24 hr tablet Take 1 tablet (25 mg) by mouth once daily. Do not crush or chew. 30 tablet 11    potassium chloride CR 20 mEq ER tablet Take 1 tablet (20 mEq) by mouth once daily. Do not crush or chew.       No current facility-administered medications for this visit.       Objective   There were no vitals taken for this  visit.  Physical Exam  Vitals and nursing note reviewed.   Constitutional:       General: He is not in acute distress.     Appearance: Normal appearance. He is not ill-appearing.   HENT:      Head: Normocephalic and atraumatic.      Right Ear: External ear normal.      Left Ear: External ear normal.      Nose: Nose normal.      Mouth/Throat:      Mouth: Mucous membranes are moist.      Pharynx: Oropharynx is clear.   Eyes:      Extraocular Movements: Extraocular movements intact.      Conjunctiva/sclera: Conjunctivae normal.      Pupils: Pupils are equal, round, and reactive to light.   Cardiovascular:      Rate and Rhythm: Normal rate and regular rhythm.      Pulses: Normal pulses.      Heart sounds: Normal heart sounds. No murmur heard.  Pulmonary:      Effort: Pulmonary effort is normal.      Breath sounds: Normal breath sounds.   Abdominal:      General: Bowel sounds are normal. There is no distension.      Palpations: Abdomen is soft.      Tenderness: There is no abdominal tenderness.   Genitourinary:     Comments: Lr catheter out  Musculoskeletal:         General: No swelling.      Cervical back: Normal range of motion.      Right lower leg: No edema.      Left lower leg: No edema.      Comments: Limited ROM of right shoulder with some bruising   Skin:     General: Skin is warm and dry.   Neurological:      General: No focal deficit present.      Mental Status: He is alert. He is disoriented.      Gait: Gait abnormal (sitting in w/c, able to move RLE).      Comments: Knows wife, self, thinks he is in the hospital. Oriented to month but not exact date and year.   Inattentive  Poor recall, poor insight and judgment  Follows commands.               Labs:  Most current labs reviewed: no recent labs ordered      Imaging:   Most current imaging studies reviewed: no recent imaging studies ordered      Assessment/Plan    Problem List Items Addressed This Visit             ICD-10-CM       Genitourinary and  Reproductive    Urinary retention R33.9       Musculoskeletal and Injuries    Periprosthetic fracture of hip M97.8XXA, Z96.649    Closed fracture of proximal end of right humerus with routine healing S42.201D       Neuro    Delirium R41.0       Symptoms and Signs    Debility - Primary R53.81     -Delirium improving but still present.  -To see orthopedic surgeon tomorrow  -Progress in therapy is limited by partial weightbearing status of right lower extremity, right humeral fracture and impaired cognition.  -Unsure if having urinary retention.  Though bladder for me today was not palpable.  Has renal function tomorrow.    Discussed case with: patient,  facility staff (nurse, aid, PT/OT/SLP, SW and/or DON), and Geriatrics care team    Electronically signed by: Kayla Hernandez MD

## 2024-12-24 ENCOUNTER — NURSING HOME VISIT (OUTPATIENT)
Dept: POST ACUTE CARE | Facility: EXTERNAL LOCATION | Age: 79
End: 2024-12-24
Payer: MEDICARE

## 2024-12-24 VITALS
HEART RATE: 78 BPM | RESPIRATION RATE: 18 BRPM | OXYGEN SATURATION: 98 % | TEMPERATURE: 98.4 F | BODY MASS INDEX: 22.95 KG/M2 | DIASTOLIC BLOOD PRESSURE: 89 MMHG | WEIGHT: 142.2 LBS | SYSTOLIC BLOOD PRESSURE: 169 MMHG

## 2024-12-24 DIAGNOSIS — N17.9 AKI (ACUTE KIDNEY INJURY) (CMS-HCC): ICD-10-CM

## 2024-12-24 DIAGNOSIS — M97.8XXD PERIPROSTHETIC FRACTURE OF HIP, SUBSEQUENT ENCOUNTER: ICD-10-CM

## 2024-12-24 DIAGNOSIS — I10 BENIGN ESSENTIAL HYPERTENSION: ICD-10-CM

## 2024-12-24 DIAGNOSIS — R82.71 BACTERIURIA: ICD-10-CM

## 2024-12-24 DIAGNOSIS — R33.9 URINARY RETENTION: ICD-10-CM

## 2024-12-24 DIAGNOSIS — D72.829 LEUKOCYTOSIS, UNSPECIFIED TYPE: Primary | ICD-10-CM

## 2024-12-24 DIAGNOSIS — S42.294D OTHER CLOSED NONDISPLACED FRACTURE OF PROXIMAL END OF RIGHT HUMERUS WITH ROUTINE HEALING, SUBSEQUENT ENCOUNTER: ICD-10-CM

## 2024-12-24 DIAGNOSIS — Z96.649 PERIPROSTHETIC FRACTURE OF HIP, SUBSEQUENT ENCOUNTER: ICD-10-CM

## 2024-12-24 DIAGNOSIS — R41.0 DELIRIUM: ICD-10-CM

## 2024-12-24 PROCEDURE — 99309 SBSQ NF CARE MODERATE MDM 30: CPT | Performed by: INTERNAL MEDICINE

## 2024-12-24 RX ORDER — LANOLIN ALCOHOL/MO/W.PET/CERES
100 CREAM (GRAM) TOPICAL 3 TIMES DAILY
COMMUNITY
Start: 2024-12-18

## 2024-12-24 RX ORDER — QUETIAPINE FUMARATE 25 MG/1
12.5 TABLET, FILM COATED ORAL DAILY PRN
COMMUNITY

## 2024-12-24 NOTE — ASSESSMENT & PLAN NOTE
- noncompliant with NWB and immobilizer which is recommended x 6 weeks.   - ortho appt on 2/3 w repeat xray

## 2024-12-24 NOTE — PROGRESS NOTES
Division: Geriatrics  Date of Service: 12/24/2024  Visit Type: Skilled Rehabilitation Unit Follow-up Visit    Chief complaint/Reason for follow-up: debility    Subjective   Mr. Raúl Garcia 79 y.o. year old male is admitted to [facility] for skilled rehab since 12/7/2024 after hospitalization for right femur periprosthetic greater trochanter fracture, right humeral head fracture, metabolic acidosis, leukocytosis.    Interval Hx:  History obtained from patient and  facility staff (nurse, aid, PT/OT/SLP, SW and/or DON). Collateral history obtained due topt's cognitive deficit.    HPI  Failed voiding trial last 12/20. Had a PVR of 486 ml and also had DANUTA which is now resolved. (See labs below)    Remains to be confused. Seroquel PRN restarted on 12/20 due to agitation w kitchen catheter before. Has not used it yet.    Had an ortho appt on 12/20- changed to strict NWB of RLE, no abduction of R leg. Was self transferring to toilet on his own prior to kitchen readministration wo NWB precautions. F/up in 4 weeks with repeat xray.    On-call provider was called over the weekend by nurse for concern for UTI. Unsure if pt really had symptoms of UTI but UA w reflex Ucx was ordered. Pt unreliable historian.    Pain seems to be controlled.    Bps have been 120s-140s/70s-80s.       Medical Hx reviewed. See below.     Past Medical History:   Diagnosis Date    Impacted cerumen, bilateral 12/07/2016    Bilateral hearing loss due to cerumen impaction    Other conditions influencing health status 01/20/2014    Blockage of ear    Personal history of colonic polyps 05/13/2020    History of adenomatous polyp of colon    Personal history of other diseases of the musculoskeletal system and connective tissue     Personal history of arthritis    Personal history of other diseases of the nervous system and sense organs     History of cataract     Past Surgical History:   Procedure Laterality Date    CATARACT EXTRACTION  02/19/2014     Cataract Surgery    OTHER SURGICAL HISTORY  01/20/2014    Leg Repair    OTHER SURGICAL HISTORY  05/08/2019    Hip replacement    TOTAL HIP ARTHROPLASTY  04/12/2017    Hip Replacement     No family history on file.  Social History     Tobacco Use    Smoking status: Never     Passive exposure: Never    Smokeless tobacco: Never   Substance Use Topics    Alcohol use: Yes     Alcohol/week: 14.0 standard drinks of alcohol     Types: 14 Standard drinks or equivalent per week     Comment: Occasionally       Allergies: Amlodipine, Ibuprofen, and Penicillins      Medications reviewed and reconciled.   Current Outpatient Medications   Medication Sig Dispense Refill    acetaminophen (Tylenol) 500 mg tablet Take 2 tablets (1,000 mg) by mouth 3 times a day.      apixaban (Eliquis) 5 mg tablet Take 0.5 tablets (2.5 mg) by mouth 2 times a day. 90 tablet 3    atorvastatin (Lipitor) 20 mg tablet Take 1 tablet (20 mg) by mouth once daily. 90 tablet 3    ergocalciferol (Vitamin D-2) 1.25 MG (59673 UT) capsule Take 1 capsule (1,250 mcg) by mouth 1 (one) time per week.      famciclovir (Famvir) 125 mg tablet Take 1 tablet (125 mg) by mouth once daily.      hypromellose (Vista Gonio) 2.5 % ophthalmic solution Administer 1 drop into both eyes 4 times a day as needed for dry eyes.      lidocaine (Lidocare) 4 % patch Place 1 patch on the skin once daily. Remove & discard patch within 12 hours or as directed by MD. on the right hip and right shoulder      melatonin 3 mg tablet Take 1 tablet (3 mg) by mouth once daily at bedtime.      metoprolol succinate XL (Toprol-XL) 25 mg 24 hr tablet Take 1 tablet (25 mg) by mouth once daily. Do not crush or chew. 30 tablet 11    potassium chloride CR 20 mEq ER tablet Take 1 tablet (20 mEq) by mouth once daily. Do not crush or chew.      thiamine 100 mg tablet Take 1 tablet (100 mg) by mouth 3 times a day.      QUEtiapine (SEROquel) 25 mg tablet Take 0.5 tablets (12.5 mg) by mouth once daily as needed  (agitation).       No current facility-administered medications for this visit.       Objective   /89   Pulse 78   Temp 36.9 °C (98.4 °F)   Resp 18   Wt 64.5 kg (142 lb 3.2 oz)   SpO2 98%   BMI 22.95 kg/m²   Physical Exam  Vitals and nursing note reviewed.   Constitutional:       General: He is not in acute distress.     Appearance: Normal appearance. He is not ill-appearing.   HENT:      Head: Normocephalic and atraumatic.      Right Ear: External ear normal.      Left Ear: External ear normal.      Nose: Nose normal.      Mouth/Throat:      Mouth: Mucous membranes are moist.      Pharynx: Oropharynx is clear.   Eyes:      Extraocular Movements: Extraocular movements intact.      Conjunctiva/sclera: Conjunctivae normal.      Pupils: Pupils are equal, round, and reactive to light.   Cardiovascular:      Rate and Rhythm: Normal rate and regular rhythm.      Pulses: Normal pulses.      Heart sounds: Normal heart sounds. No murmur heard.  Pulmonary:      Effort: Pulmonary effort is normal.      Breath sounds: Normal breath sounds.   Abdominal:      General: Bowel sounds are normal. There is no distension.      Palpations: Abdomen is soft.      Tenderness: There is no abdominal tenderness.   Genitourinary:     Comments: Lr catheter in place  Musculoskeletal:         General: No swelling.      Cervical back: Normal range of motion.      Right lower leg: No edema.      Left lower leg: No edema.      Comments: Limited ROM of right shoulder with some bruising   Skin:     General: Skin is warm and dry.   Neurological:      General: No focal deficit present.      Mental Status: He is alert. He is disoriented.      Gait: Gait abnormal (sitting in w/c, able to move RLE).      Comments: Knows wife, self, thinks he is in the hospital. Oriented to month but not exact date and year.   Inattentive  Poor recall, poor insight and judgment  Follows commands.         Labs:  Most current labs reviewed: see below for the  results  12/20/24:         12/23/2024:       12/23/2024:       Imaging:   Most current imaging studies reviewed: no recent imaging studies ordered      Assessment/Plan    Problem List Items Addressed This Visit             ICD-10-CM       Cardiac and Vasculature    Benign essential hypertension I10     - controlled. Continue metoprolol succinate 25mg daily              Genitourinary and Reproductive    DANUTA (acute kidney injury) (CMS-HCC) N17.9     - resolving. For repeat BMP on 12/26         Urinary retention R33.9    Bacteriuria R82.71     - awaiting final urine culture. Pt not reliable historian to give symptoms. Consider tx for complicated UTI if Ucx positive then consider voiding trial in a week after tx.            Hematology and Neoplasia    Leukocytosis - Primary D72.829     - resolved. Repeat cbc w diff on 12/26            Musculoskeletal and Injuries    Periprosthetic fracture of hip M97.8XXA, Z96.649     - Continue tylenol 1g TID and also lidocaine patch to R shoulder and R hip  - Now NWB RLE til f/up w ortho on 1/17., has repeat xrays then  - restart dvt proph w eliquis 2.5mg BID til 1/14/25 to complete 35 d course.         Closed fracture of proximal end of right humerus with routine healing S42.201D     - noncompliant with NWB and immobilizer which is recommended x 6 weeks.   - ortho appt on 2/3 w repeat xray            Neuro    Delirium R41.0     - pleasantly confused. persists            Discussed case with:  facility staff (nurse, aid, PT/OT/SLP, SW and/or DON) and Geriatrics care team    Electronically signed by: Kayla Hernandez MD

## 2024-12-24 NOTE — ASSESSMENT & PLAN NOTE
- awaiting final urine culture. Pt not reliable historian to give symptoms. Consider tx for complicated UTI if Ucx positive then consider voiding trial in a week after tx.

## 2024-12-24 NOTE — LETTER
Patient: Raúl Garcia  : 1945    Encounter Date: 2024        Division: Geriatrics  Date of Service: 2024  Visit Type: Skilled Rehabilitation Unit Follow-up Visit    Chief complaint/Reason for follow-up: debility    Subjective  Mr. Raúl Garcia 79 y.o. year old male is admitted to [facility] for skilled rehab since 2024 after hospitalization for right femur periprosthetic greater trochanter fracture, right humeral head fracture, metabolic acidosis, leukocytosis.    Interval Hx:  History obtained from patient and  facility staff (nurse, aid, PT/OT/SLP, SW and/or DON). Collateral history obtained due topt's cognitive deficit.    HPI  Failed voiding trial last . Had a PVR of 486 ml and also had DANUTA which is now resolved. (See labs below)    Remains to be confused. Seroquel PRN restarted on  due to agitation w kitchen catheter before. Has not used it yet.    Had an ortho appt on - changed to strict NWB of RLE, no abduction of R leg. Was self transferring to toilet on his own prior to kitchen readministration wo NWB precautions. F/up in 4 weeks with repeat xray.    On-call provider was called over the weekend by nurse for concern for UTI. Unsure if pt really had symptoms of UTI but UA w reflex Ucx was ordered. Pt unreliable historian.    Pain seems to be controlled.    Bps have been 120s-140s/70s-80s.       Medical Hx reviewed. See below.     Past Medical History:   Diagnosis Date   • Impacted cerumen, bilateral 2016    Bilateral hearing loss due to cerumen impaction   • Other conditions influencing health status 2014    Blockage of ear   • Personal history of colonic polyps 2020    History of adenomatous polyp of colon   • Personal history of other diseases of the musculoskeletal system and connective tissue     Personal history of arthritis   • Personal history of other diseases of the nervous system and sense organs     History of cataract     Past Surgical  History:   Procedure Laterality Date   • CATARACT EXTRACTION  02/19/2014    Cataract Surgery   • OTHER SURGICAL HISTORY  01/20/2014    Leg Repair   • OTHER SURGICAL HISTORY  05/08/2019    Hip replacement   • TOTAL HIP ARTHROPLASTY  04/12/2017    Hip Replacement     No family history on file.  Social History     Tobacco Use   • Smoking status: Never     Passive exposure: Never   • Smokeless tobacco: Never   Substance Use Topics   • Alcohol use: Yes     Alcohol/week: 14.0 standard drinks of alcohol     Types: 14 Standard drinks or equivalent per week     Comment: Occasionally       Allergies: Amlodipine, Ibuprofen, and Penicillins      Medications reviewed and reconciled.   Current Outpatient Medications   Medication Sig Dispense Refill   • acetaminophen (Tylenol) 500 mg tablet Take 2 tablets (1,000 mg) by mouth 3 times a day.     • apixaban (Eliquis) 5 mg tablet Take 0.5 tablets (2.5 mg) by mouth 2 times a day. 90 tablet 3   • atorvastatin (Lipitor) 20 mg tablet Take 1 tablet (20 mg) by mouth once daily. 90 tablet 3   • ergocalciferol (Vitamin D-2) 1.25 MG (59541 UT) capsule Take 1 capsule (1,250 mcg) by mouth 1 (one) time per week.     • famciclovir (Famvir) 125 mg tablet Take 1 tablet (125 mg) by mouth once daily.     • hypromellose (Vista Gonio) 2.5 % ophthalmic solution Administer 1 drop into both eyes 4 times a day as needed for dry eyes.     • lidocaine (Lidocare) 4 % patch Place 1 patch on the skin once daily. Remove & discard patch within 12 hours or as directed by MD. on the right hip and right shoulder     • melatonin 3 mg tablet Take 1 tablet (3 mg) by mouth once daily at bedtime.     • metoprolol succinate XL (Toprol-XL) 25 mg 24 hr tablet Take 1 tablet (25 mg) by mouth once daily. Do not crush or chew. 30 tablet 11   • potassium chloride CR 20 mEq ER tablet Take 1 tablet (20 mEq) by mouth once daily. Do not crush or chew.     • thiamine 100 mg tablet Take 1 tablet (100 mg) by mouth 3 times a day.     •  QUEtiapine (SEROquel) 25 mg tablet Take 0.5 tablets (12.5 mg) by mouth once daily as needed (agitation).       No current facility-administered medications for this visit.       Objective  /89   Pulse 78   Temp 36.9 °C (98.4 °F)   Resp 18   Wt 64.5 kg (142 lb 3.2 oz)   SpO2 98%   BMI 22.95 kg/m²   Physical Exam  Vitals and nursing note reviewed.   Constitutional:       General: He is not in acute distress.     Appearance: Normal appearance. He is not ill-appearing.   HENT:      Head: Normocephalic and atraumatic.      Right Ear: External ear normal.      Left Ear: External ear normal.      Nose: Nose normal.      Mouth/Throat:      Mouth: Mucous membranes are moist.      Pharynx: Oropharynx is clear.   Eyes:      Extraocular Movements: Extraocular movements intact.      Conjunctiva/sclera: Conjunctivae normal.      Pupils: Pupils are equal, round, and reactive to light.   Cardiovascular:      Rate and Rhythm: Normal rate and regular rhythm.      Pulses: Normal pulses.      Heart sounds: Normal heart sounds. No murmur heard.  Pulmonary:      Effort: Pulmonary effort is normal.      Breath sounds: Normal breath sounds.   Abdominal:      General: Bowel sounds are normal. There is no distension.      Palpations: Abdomen is soft.      Tenderness: There is no abdominal tenderness.   Genitourinary:     Comments: Lr catheter in place  Musculoskeletal:         General: No swelling.      Cervical back: Normal range of motion.      Right lower leg: No edema.      Left lower leg: No edema.      Comments: Limited ROM of right shoulder with some bruising   Skin:     General: Skin is warm and dry.   Neurological:      General: No focal deficit present.      Mental Status: He is alert. He is disoriented.      Gait: Gait abnormal (sitting in w/c, able to move RLE).      Comments: Knows wife, self, thinks he is in the hospital. Oriented to month but not exact date and year.   Inattentive  Poor recall, poor insight and  judgment  Follows commands.         Labs:  Most current labs reviewed: see below for the results  12/20/24:         12/23/2024:       12/23/2024:       Imaging:   Most current imaging studies reviewed: no recent imaging studies ordered      Assessment/Plan   Problem List Items Addressed This Visit             ICD-10-CM       Cardiac and Vasculature    Benign essential hypertension I10     - controlled. Continue metoprolol succinate 25mg daily              Genitourinary and Reproductive    DANUTA (acute kidney injury) (CMS-HCC) N17.9     - resolving. For repeat BMP on 12/26         Urinary retention R33.9    Bacteriuria R82.71     - awaiting final urine culture. Pt not reliable historian to give symptoms. Consider tx for complicated UTI if Ucx positive then consider voiding trial in a week after tx.            Hematology and Neoplasia    Leukocytosis - Primary D72.829     - resolved. Repeat cbc w diff on 12/26            Musculoskeletal and Injuries    Periprosthetic fracture of hip M97.8XXA, Z96.649     - Continue tylenol 1g TID and also lidocaine patch to R shoulder and R hip  - Now NWB RLE til f/up w ortho on 1/17., has repeat xrays then  - restart dvt proph w eliquis 2.5mg BID til 1/14/25 to complete 35 d course.         Closed fracture of proximal end of right humerus with routine healing S42.201D     - noncompliant with NWB and immobilizer which is recommended x 6 weeks.   - ortho appt on 2/3 w repeat xray            Neuro    Delirium R41.0     - pleasantly confused. persists            Discussed case with:  facility staff (nurse, aid, PT/OT/SLP, SW and/or DON) and Geriatrics care team    Electronically signed by: Kayla Hernandez MD      Electronically Signed By: Kayla Hernandez MD   12/24/24  6:07 PM

## 2024-12-24 NOTE — ASSESSMENT & PLAN NOTE
- Continue tylenol 1g TID and also lidocaine patch to R shoulder and R hip  - Now NWB RLE til f/up w ortho on 1/17., has repeat xrays then  - restart dvt proph w eliquis 2.5mg BID til 1/14/25 to complete 35 d course.

## 2024-12-27 ENCOUNTER — NURSING HOME VISIT (OUTPATIENT)
Dept: GERIATRIC MEDICINE | Facility: CLINIC | Age: 79
End: 2024-12-27
Payer: MEDICARE

## 2024-12-27 VITALS
RESPIRATION RATE: 20 BRPM | BODY MASS INDEX: 22.95 KG/M2 | SYSTOLIC BLOOD PRESSURE: 149 MMHG | TEMPERATURE: 97.2 F | WEIGHT: 142.2 LBS | HEART RATE: 98 BPM | DIASTOLIC BLOOD PRESSURE: 83 MMHG

## 2024-12-27 DIAGNOSIS — N18.30 STAGE 3 CHRONIC KIDNEY DISEASE, UNSPECIFIED WHETHER STAGE 3A OR 3B CKD (MULTI): ICD-10-CM

## 2024-12-27 DIAGNOSIS — T83.511A URINARY TRACT INFECTION ASSOCIATED WITH INDWELLING URETHRAL CATHETER, INITIAL ENCOUNTER (CMS-HCC): Primary | ICD-10-CM

## 2024-12-27 DIAGNOSIS — N39.0 URINARY TRACT INFECTION ASSOCIATED WITH INDWELLING URETHRAL CATHETER, INITIAL ENCOUNTER (CMS-HCC): Primary | ICD-10-CM

## 2024-12-27 DIAGNOSIS — R33.9 URINARY RETENTION: ICD-10-CM

## 2024-12-27 RX ORDER — NITROFURANTOIN 25; 75 MG/1; MG/1
100 CAPSULE ORAL 2 TIMES DAILY
COMMUNITY
Start: 2024-12-28 | End: 2025-01-03

## 2024-12-27 NOTE — PROGRESS NOTES
Division: Geriatrics  Date of Service: 12/27/2024  Visit Type: Skilled Rehabilitation Unit Follow-up Visit    Chief complaint/Reason for follow-up: urine culture result    Subjective   Mr. Raúl Garcia 79 y.o. year old male is admitted to [facility] for skilled rehab since 12/7/2024 after hospitalization for right femur periprosthetic greater trochanter fracture, right humeral head fracture, metabolic acidosis, leukocytosis..     Interval Hx:  Hx came from medical staff, nursing home medical records/notes. Pt unable to give hx due to cognitive deficits.     HPI  See below for Ucx:     Review of systems cannot be obtained from pt d/t confusion. Has not required seroquel though.     Ordered labs for yesterday but was not drawn.     No fever.     Medical Hx reviewed. See below.     Past Medical History:   Diagnosis Date    Impacted cerumen, bilateral 12/07/2016    Bilateral hearing loss due to cerumen impaction    Other conditions influencing health status 01/20/2014    Blockage of ear    Personal history of colonic polyps 05/13/2020    History of adenomatous polyp of colon    Personal history of other diseases of the musculoskeletal system and connective tissue     Personal history of arthritis    Personal history of other diseases of the nervous system and sense organs     History of cataract     Past Surgical History:   Procedure Laterality Date    CATARACT EXTRACTION  02/19/2014    Cataract Surgery    OTHER SURGICAL HISTORY  01/20/2014    Leg Repair    OTHER SURGICAL HISTORY  05/08/2019    Hip replacement    TOTAL HIP ARTHROPLASTY  04/12/2017    Hip Replacement     No family history on file.  Social History     Tobacco Use    Smoking status: Never     Passive exposure: Never    Smokeless tobacco: Never   Substance Use Topics    Alcohol use: Yes     Alcohol/week: 14.0 standard drinks of alcohol     Types: 14 Standard drinks or equivalent per week     Comment: Occasionally       Allergies: Amlodipine,  Ibuprofen, and Penicillins      Medications reviewed and reconciled.   Current Outpatient Medications   Medication Sig Dispense Refill    acetaminophen (Tylenol) 500 mg tablet Take 2 tablets (1,000 mg) by mouth 3 times a day.      apixaban (Eliquis) 5 mg tablet Take 0.5 tablets (2.5 mg) by mouth 2 times a day. 90 tablet 3    atorvastatin (Lipitor) 20 mg tablet Take 1 tablet (20 mg) by mouth once daily. 90 tablet 3    ergocalciferol (Vitamin D-2) 1.25 MG (17411 UT) capsule Take 1 capsule (1,250 mcg) by mouth 1 (one) time per week.      famciclovir (Famvir) 125 mg tablet Take 1 tablet (125 mg) by mouth once daily.      hypromellose (Vista Gonio) 2.5 % ophthalmic solution Administer 1 drop into both eyes 4 times a day as needed for dry eyes.      lidocaine (Lidocare) 4 % patch Place 1 patch on the skin once daily. Remove & discard patch within 12 hours or as directed by MD. on the right hip and right shoulder      melatonin 3 mg tablet Take 1 tablet (3 mg) by mouth once daily at bedtime.      metoprolol succinate XL (Toprol-XL) 25 mg 24 hr tablet Take 1 tablet (25 mg) by mouth once daily. Do not crush or chew. 30 tablet 11    potassium chloride CR 20 mEq ER tablet Take 1 tablet (20 mEq) by mouth once daily. Do not crush or chew.      QUEtiapine (SEROquel) 25 mg tablet Take 0.5 tablets (12.5 mg) by mouth once daily as needed (agitation).      thiamine 100 mg tablet Take 1 tablet (100 mg) by mouth 3 times a day.      [START ON 12/28/2024] nitrofurantoin, macrocrystal-monohydrate, (Macrobid) 100 mg capsule Take 1 capsule (100 mg) by mouth 2 times a day. Do not fill before December 28, 2024.       No current facility-administered medications for this visit.       Objective   /83   Pulse 98   Temp 36.2 °C (97.2 °F)   Resp 20   Wt 64.5 kg (142 lb 3.2 oz)   BMI 22.95 kg/m²   Physical Exam  Vitals and nursing note reviewed.   Constitutional:       General: He is not in acute distress.     Appearance: Normal  appearance. He is not ill-appearing.      Comments: Disheveled, confused with the catheter   HENT:      Head: Normocephalic and atraumatic.      Right Ear: External ear normal.      Left Ear: External ear normal.      Nose: Nose normal.      Mouth/Throat:      Mouth: Mucous membranes are moist.      Pharynx: Oropharynx is clear.   Eyes:      Extraocular Movements: Extraocular movements intact.      Conjunctiva/sclera: Conjunctivae normal.      Pupils: Pupils are equal, round, and reactive to light.   Cardiovascular:      Rate and Rhythm: Normal rate and regular rhythm.      Pulses: Normal pulses.      Heart sounds: Normal heart sounds. No murmur heard.  Pulmonary:      Effort: Pulmonary effort is normal.      Breath sounds: Normal breath sounds.   Abdominal:      General: Bowel sounds are normal. There is no distension.      Palpations: Abdomen is soft.      Tenderness: There is no abdominal tenderness.   Genitourinary:     Comments: Lr catheter in place  Musculoskeletal:         General: No swelling.      Cervical back: Normal range of motion.      Right lower leg: No edema.      Left lower leg: No edema.      Comments: Limited ROM of right shoulder with some bruising   Skin:     General: Skin is warm and dry.   Neurological:      General: No focal deficit present.      Mental Status: He is alert. He is disoriented.      Gait: Gait abnormal (sitting in w/c, able to move RLE).      Comments: Confused. impulsive  Inattentive  Poor recall, poor insight and judgment  Follows commands.         Labs:  Most current labs reviewed: see above      Imaging:   Most current imaging studies reviewed: no recent imaging studies ordered      Assessment/Plan    Problem List Items Addressed This Visit             ICD-10-CM       Genitourinary and Reproductive    Urinary retention R33.9     - will attempt once done with treatment with UTI         Stage 3 chronic kidney disease (Multi) N18.30     - for cbc with diff and renal  function panel on 12/30         Urinary tract infection associated with indwelling urethral catheter (CMS-[facility]) - Primary T83.511A, N39.0     - start macrobid 100mg/tab 1 tab BID x 7 days. Pt not reliable historian to obtain symptoms.            Discussed case with:patient, wife,  facility staff (nurse, aid, PT/OT/SLP, SW and/or DON) and Geriatrics care team    Electronically signed by: Kayla Hernandez MD

## 2024-12-30 ENCOUNTER — NURSING HOME VISIT (OUTPATIENT)
Dept: POST ACUTE CARE | Facility: EXTERNAL LOCATION | Age: 79
End: 2024-12-30
Payer: MEDICARE

## 2024-12-30 VITALS
HEART RATE: 72 BPM | TEMPERATURE: 97.6 F | SYSTOLIC BLOOD PRESSURE: 122 MMHG | WEIGHT: 142 LBS | DIASTOLIC BLOOD PRESSURE: 76 MMHG | OXYGEN SATURATION: 100 % | RESPIRATION RATE: 14 BRPM | BODY MASS INDEX: 22.92 KG/M2

## 2024-12-30 DIAGNOSIS — N39.0 URINARY TRACT INFECTION ASSOCIATED WITH INDWELLING URETHRAL CATHETER, SUBSEQUENT ENCOUNTER: Primary | ICD-10-CM

## 2024-12-30 DIAGNOSIS — T83.511D URINARY TRACT INFECTION ASSOCIATED WITH INDWELLING URETHRAL CATHETER, SUBSEQUENT ENCOUNTER: Primary | ICD-10-CM

## 2024-12-30 DIAGNOSIS — I48.0 PAROXYSMAL ATRIAL FIBRILLATION (MULTI): ICD-10-CM

## 2024-12-30 DIAGNOSIS — I10 BENIGN ESSENTIAL HYPERTENSION: ICD-10-CM

## 2024-12-30 DIAGNOSIS — R41.0 DELIRIUM: ICD-10-CM

## 2024-12-30 PROCEDURE — 99308 SBSQ NF CARE LOW MDM 20: CPT | Performed by: CLINICAL NURSE SPECIALIST

## 2024-12-30 NOTE — LETTER
"Patient: Raúl Garcia  : 1945    Encounter Date: 2024    Reason for visit: follow up on UTI and delirium    HPI: Raúl was seen today in his room at [facility]; last week he was started on Macrobid for bacteriuria (>100,000 each of enterococcus faecalis and staph aureus). He has continued to be confused here and despite a positive urine culture, was not really having any acute symptoms other than retention. A kitchen was placed again and there are plans for him to discharge home with it and follow up with a urologist.    Chief Complaint: “I am discharging home today. My wife should have been here by now\"    Review of Systems   General: no fever or chills, does not feel ill; frustrated about no one knowing about his discharge today; feels he is sleeping at night, not tired during the day  Skin: left shin abrasion; aware of right arm bruising, no other skin issues  EENT:  vision and hearing are fine; no soreness of throat; no trouble chewing or swallowing  Respiratory: no cough, congestion or shortness of breath  Cardiac: no chest pain or palpitations  Gastrointestinal: no abdominal pain, nausea, vomiting, heartburn, diarrhea or constipation; BM every day without straining; no rectal pain or bleeding; no hemorrhoids  Genitourinary: he is puzzled as to why he has the kitchen and can't understand what it is; no discomfort of urinary tract  Musculoskeletal: generalized soreness, no specific pain; no limitation of ROM of neck/back/left side  Neurologic: feels his long and short term memory intact; no headaches, dizziness or lightheadedness; no tremors or involuntary movements; no altered sensation; right arm and leg weaker  Psychiatric: frustrated with situation that staff does not know about discharge    Physical Examination   Vitals: Blood pressure 122/76, pulse 72, temperature 36.4 °C (97.6 °F), resp. rate 14, weight 64.4 kg (142 lb), SpO2 100%.  General: sitting in wheelchair in his room, calm initially then " defensive about me not knowing he is leaving today and also disturbed by having the catheter  Neurologic: alert, oriented x2, really mixed up about his situation; good long term memory; speech clear and fluent; facial features symmetrical and tongue midline; equal peripheral strength, tone and sensation  Skin: no abnormalities of hair or nails; right arm bruising very faint now; left shin laterally with scabbed abrasions about less than inch diameter but at least 3, scabbed  HEENT: no trauma, bruises, swelling; adequate hearing of close conversation; adequate vision for ADLs; no eye/ear/nasal drainage or irritation; oral mucosa moist and pink w/o lesions or discolorations; no coughing while swallowing  Respiratory: unlabored; chest symmetrical w/ good rise and fall; no coughing, breath sounds equal and clear throughout  Heart: irregular rhythm, rate controlled; normal heart sounds w/ no murmurs, rubs and gallops  Abdomen: nondistended, nontenderness, bowel sounds active x4, no masses/hernias  Musculoskeletal: right arm bruised and swollen and not in the sling, limited use but attempts to use it; right leg with decent movement but not able to put much wt on it due to pain it seems; adequate ROM of left arm and leg and neck, no kyphosis or scoliosis; able to sit upright unassisted  PV: skin warm, dry, no cyanosis, no clubbing of nails; right arm and right thigh swollen; palpable radial and pedal pulses  : kitchen catheter intact with moderate amount clear yellow   Psych: seems discouraged; a little defensive    Diagnoses and Plan:   Urinary retention with bacteriuria - had pulled kitchen out at least once, pulled apart the tubing a few times,so this may have contributed to the bacteriuria;  treated for bacteriuria with macrobid for few more days; keep kitchen in; follow up with urologist  Hyponatremia - awaiting bmp results today but last Na was 133  Closed fracture of proximal end of right humerus with routine healing /  right bowen-orthotic femoral head fracture - needs a lot of reinforcement for the sling since he is trying to use his arm; he seems ok with only tylenol while I was working with him; continue routine tylenol  Acute delirium with hx daily alcohol use - generally confused daily, delusional about discharge, poor understanding of having the kitchen, not improved; UTI may now be contributing  Htn and ckd stage 3b- toprol; BP wnl; all meds renal appropriate; keep do not want to have hydrated; unsure of cause of ckd  5. Hypokalemia - on KCL supplement for K 3.3  6. Paroxysmal afib - on toprol and eliquis; stable  7. Vitamin D deficiency - on D supplement  8. Anemia - multifactorial; no treatment; hgb 10.3 last week    Nurse to call tomorrow if high post void residual.       Electronically Signed By: ROHINI Robbins   12/30/24  8:40 PM

## 2024-12-31 NOTE — PROGRESS NOTES
"Reason for visit: follow up on UTI and delirium    HPI: Raúl was seen today in his room at [facility]; last week he was started on Macrobid for bacteriuria (>100,000 each of enterococcus faecalis and staph aureus). He has continued to be confused here and despite a positive urine culture, was not really having any acute symptoms other than retention. A kitchen was placed again and there are plans for him to discharge home with it and follow up with a urologist.    Chief Complaint: “I am discharging home today. My wife should have been here by now\"    Review of Systems   General: no fever or chills, does not feel ill; frustrated about no one knowing about his discharge today; feels he is sleeping at night, not tired during the day  Skin: left shin abrasion; aware of right arm bruising, no other skin issues  EENT:  vision and hearing are fine; no soreness of throat; no trouble chewing or swallowing  Respiratory: no cough, congestion or shortness of breath  Cardiac: no chest pain or palpitations  Gastrointestinal: no abdominal pain, nausea, vomiting, heartburn, diarrhea or constipation; BM every day without straining; no rectal pain or bleeding; no hemorrhoids  Genitourinary: he is puzzled as to why he has the kitchen and can't understand what it is; no discomfort of urinary tract  Musculoskeletal: generalized soreness, no specific pain; no limitation of ROM of neck/back/left side  Neurologic: feels his long and short term memory intact; no headaches, dizziness or lightheadedness; no tremors or involuntary movements; no altered sensation; right arm and leg weaker  Psychiatric: frustrated with situation that staff does not know about discharge    Physical Examination   Vitals: Blood pressure 122/76, pulse 72, temperature 36.4 °C (97.6 °F), resp. rate 14, weight 64.4 kg (142 lb), SpO2 100%.  General: sitting in wheelchair in his room, calm initially then defensive about me not knowing he is leaving today and also disturbed " by having the catheter  Neurologic: alert, oriented x2, really mixed up about his situation; good long term memory; speech clear and fluent; facial features symmetrical and tongue midline; equal peripheral strength, tone and sensation  Skin: no abnormalities of hair or nails; right arm bruising very faint now; left shin laterally with scabbed abrasions about less than inch diameter but at least 3, scabbed  HEENT: no trauma, bruises, swelling; adequate hearing of close conversation; adequate vision for ADLs; no eye/ear/nasal drainage or irritation; oral mucosa moist and pink w/o lesions or discolorations; no coughing while swallowing  Respiratory: unlabored; chest symmetrical w/ good rise and fall; no coughing, breath sounds equal and clear throughout  Heart: irregular rhythm, rate controlled; normal heart sounds w/ no murmurs, rubs and gallops  Abdomen: nondistended, nontenderness, bowel sounds active x4, no masses/hernias  Musculoskeletal: right arm bruised and swollen and not in the sling, limited use but attempts to use it; right leg with decent movement but not able to put much wt on it due to pain it seems; adequate ROM of left arm and leg and neck, no kyphosis or scoliosis; able to sit upright unassisted  PV: skin warm, dry, no cyanosis, no clubbing of nails; right arm and right thigh swollen; palpable radial and pedal pulses  : kitchen catheter intact with moderate amount clear yellow   Psych: seems discouraged; a little defensive    Diagnoses and Plan:   Urinary retention with bacteriuria - had pulled kitchen out at least once, pulled apart the tubing a few times,so this may have contributed to the bacteriuria;  treated for bacteriuria with macrobid for few more days; keep kitchen in; follow up with urologist  Hyponatremia - awaiting bmp results today but last Na was 133  Closed fracture of proximal end of right humerus with routine healing / right bowen-orthotic femoral head fracture - needs a lot of  reinforcement for the sling since he is trying to use his arm; he seems ok with only tylenol while I was working with him; continue routine tylenol  Acute delirium with hx daily alcohol use - generally confused daily, delusional about discharge, poor understanding of having the kitchen, not improved; UTI may now be contributing  Htn and ckd stage 3b- toprol; BP wnl; all meds renal appropriate; keep do not want to have hydrated; unsure of cause of ckd  5. Hypokalemia - on KCL supplement for K 3.3  6. Paroxysmal afib - on toprol and eliquis; stable  7. Vitamin D deficiency - on D supplement  8. Anemia - multifactorial; no treatment; hgb 10.3 last week    Nurse to call tomorrow if high post void residual.

## 2025-01-02 ENCOUNTER — NURSING HOME VISIT (OUTPATIENT)
Dept: POST ACUTE CARE | Facility: EXTERNAL LOCATION | Age: 80
End: 2025-01-02
Payer: MEDICARE

## 2025-01-02 VITALS
RESPIRATION RATE: 20 BRPM | HEART RATE: 85 BPM | OXYGEN SATURATION: 98 % | TEMPERATURE: 97.3 F | SYSTOLIC BLOOD PRESSURE: 179 MMHG | DIASTOLIC BLOOD PRESSURE: 89 MMHG

## 2025-01-02 DIAGNOSIS — R33.9 URINARY RETENTION: ICD-10-CM

## 2025-01-02 DIAGNOSIS — T83.511D URINARY TRACT INFECTION ASSOCIATED WITH INDWELLING URETHRAL CATHETER, SUBSEQUENT ENCOUNTER: Primary | ICD-10-CM

## 2025-01-02 DIAGNOSIS — N39.0 URINARY TRACT INFECTION ASSOCIATED WITH INDWELLING URETHRAL CATHETER, SUBSEQUENT ENCOUNTER: Primary | ICD-10-CM

## 2025-01-02 DIAGNOSIS — I10 BENIGN ESSENTIAL HYPERTENSION: ICD-10-CM

## 2025-01-02 PROCEDURE — 99309 SBSQ NF CARE MODERATE MDM 30: CPT | Performed by: INTERNAL MEDICINE

## 2025-01-02 PROCEDURE — G2211 COMPLEX E/M VISIT ADD ON: HCPCS | Performed by: INTERNAL MEDICINE

## 2025-01-02 RX ORDER — METOPROLOL SUCCINATE 50 MG/1
50 TABLET, EXTENDED RELEASE ORAL DAILY
COMMUNITY

## 2025-01-02 RX ORDER — BISMUTH SUBSALICYLATE 262 MG
1 TABLET,CHEWABLE ORAL DAILY
COMMUNITY

## 2025-01-02 NOTE — PROGRESS NOTES
Division: Geriatrics  Date of Service: 1/2/2025  Visit Type: Skilled Rehabilitation Unit Follow-up Visit    Chief complaint/Reason for follow-up: No chief complaint on file.    Subjective   Mr. Raúl Garcia 79 y.o. year old male is admitted to [facility] for skilled rehab since 12/7/2024 after hospitalization for right femur periprosthetic greater trochanter fracture, right humeral head fracture, metabolic acidosis, leukocytosis.    Interval Hx:  History obtained from patient and  facility staff (nurse, aid, PT/OT/SLP, SW and/or DON). Collateral history obtained due to pt's cognitive deficit.     HPI  Bps have been 120s-130s/70s-80s w periors of 150s-170s systolic tania in the evening.     Has not required seroquel since it has restarted on 12/20. Though he remains confused. Per nurse, he is not compliant at times with care.     Medical Hx reviewed. See below.     Past Medical History:   Diagnosis Date    Impacted cerumen, bilateral 12/07/2016    Bilateral hearing loss due to cerumen impaction    Other conditions influencing health status 01/20/2014    Blockage of ear    Personal history of colonic polyps 05/13/2020    History of adenomatous polyp of colon    Personal history of other diseases of the musculoskeletal system and connective tissue     Personal history of arthritis    Personal history of other diseases of the nervous system and sense organs     History of cataract     Past Surgical History:   Procedure Laterality Date    CATARACT EXTRACTION  02/19/2014    Cataract Surgery    OTHER SURGICAL HISTORY  01/20/2014    Leg Repair    OTHER SURGICAL HISTORY  05/08/2019    Hip replacement    TOTAL HIP ARTHROPLASTY  04/12/2017    Hip Replacement     No family history on file.  Social History     Tobacco Use    Smoking status: Never     Passive exposure: Never    Smokeless tobacco: Never   Substance Use Topics    Alcohol use: Yes     Alcohol/week: 14.0 standard drinks of alcohol     Types: 14 Standard drinks  or equivalent per week     Comment: Occasionally       Allergies: Amlodipine, Ibuprofen, and Penicillins      Medications reviewed and reconciled.   Current Outpatient Medications   Medication Sig Dispense Refill    acetaminophen (Tylenol) 500 mg tablet Take 2 tablets (1,000 mg) by mouth 3 times a day.      apixaban (Eliquis) 5 mg tablet Take 0.5 tablets (2.5 mg) by mouth 2 times a day. 90 tablet 3    atorvastatin (Lipitor) 20 mg tablet Take 1 tablet (20 mg) by mouth once daily. 90 tablet 3    ergocalciferol (Vitamin D-2) 1.25 MG (19320 UT) capsule Take 1 capsule (1,250 mcg) by mouth 1 (one) time per week.      famciclovir (Famvir) 125 mg tablet Take 1 tablet (125 mg) by mouth once daily.      hypromellose (Vista Gonio) 2.5 % ophthalmic solution Administer 1 drop into both eyes 4 times a day as needed for dry eyes. (Patient not taking: Reported on 12/30/2024)      lidocaine (Lidocare) 4 % patch Place 1 patch on the skin once daily. Remove & discard patch within 12 hours or as directed by MD. on the right hip and right shoulder      melatonin 3 mg tablet Take 1 tablet (3 mg) by mouth once daily at bedtime.      metoprolol succinate XL (Toprol-XL) 25 mg 24 hr tablet Take 1 tablet (25 mg) by mouth once daily. Do not crush or chew. 30 tablet 11    nitrofurantoin, macrocrystal-monohydrate, (Macrobid) 100 mg capsule Take 1 capsule (100 mg) by mouth 2 times a day. Do not fill before December 28, 2024.      potassium chloride CR 20 mEq ER tablet Take 1 tablet (20 mEq) by mouth once daily. Do not crush or chew.      QUEtiapine (SEROquel) 25 mg tablet Take 0.5 tablets (12.5 mg) by mouth once daily as needed (agitation).      thiamine 100 mg tablet Take 1 tablet (100 mg) by mouth 3 times a day.       No current facility-administered medications for this visit.       Objective   There were no vitals taken for this visit.  Physical Exam  Vitals and nursing note reviewed.   Constitutional:       Comments: Pt refused to be  examined today. Confused, inattentive           Labs:  Most current labs reviewed: no recent labs ordered      Imaging:   Most current imaging studies reviewed: no recent imaging studies ordered      Assessment/Plan    Problem List Items Addressed This Visit             ICD-10-CM    Benign essential hypertension I10     - uncontrolled. Increase metoprolol succinate to 50 mg daily. Hold for SBP <110 or HR <60.           Urinary retention R33.9     - voiding trial on 1/6, 2 days after abx ends         Urinary tract infection associated with indwelling urethral catheter (CMS-[facility]) - Primary T83.511A, N39.0     Ending nitrofurantoin on 1/4            Discussed case with:  facility staff (nurse, aid, PT/OT/SLP, SW and/or DON) and Geriatrics care team

## 2025-01-02 NOTE — LETTER
Patient: Raúl Garcia  : 1945    Encounter Date: 2025        Division: Geriatrics  Date of Service: 2025  Visit Type: Skilled Rehabilitation Unit Follow-up Visit    Chief complaint/Reason for follow-up: No chief complaint on file.    Subjective  Mr. Raúl Garcia 79 y.o. year old male is admitted to [facility] for skilled rehab since 2024 after hospitalization for right femur periprosthetic greater trochanter fracture, right humeral head fracture, metabolic acidosis, leukocytosis.    Interval Hx:  History obtained from patient and  facility staff (nurse, aid, PT/OT/SLP, SW and/or DON). Collateral history obtained due to pt's cognitive deficit.     HPI  Bps have been 120s-130s/70s-80s w periors of 150s-170s systolic tania in the evening.     Has not required seroquel since it has restarted on . Though he remains confused. Per nurse, he is not compliant at times with care.     Medical Hx reviewed. See below.     Past Medical History:   Diagnosis Date   • Impacted cerumen, bilateral 2016    Bilateral hearing loss due to cerumen impaction   • Other conditions influencing health status 2014    Blockage of ear   • Personal history of colonic polyps 2020    History of adenomatous polyp of colon   • Personal history of other diseases of the musculoskeletal system and connective tissue     Personal history of arthritis   • Personal history of other diseases of the nervous system and sense organs     History of cataract     Past Surgical History:   Procedure Laterality Date   • CATARACT EXTRACTION  2014    Cataract Surgery   • OTHER SURGICAL HISTORY  2014    Leg Repair   • OTHER SURGICAL HISTORY  2019    Hip replacement   • TOTAL HIP ARTHROPLASTY  2017    Hip Replacement     No family history on file.  Social History     Tobacco Use   • Smoking status: Never     Passive exposure: Never   • Smokeless tobacco: Never   Substance Use Topics   • Alcohol use: Yes      Alcohol/week: 14.0 standard drinks of alcohol     Types: 14 Standard drinks or equivalent per week     Comment: Occasionally       Allergies: Amlodipine, Ibuprofen, and Penicillins      Medications reviewed and reconciled.   Current Outpatient Medications   Medication Sig Dispense Refill   • acetaminophen (Tylenol) 500 mg tablet Take 2 tablets (1,000 mg) by mouth 3 times a day.     • apixaban (Eliquis) 5 mg tablet Take 0.5 tablets (2.5 mg) by mouth 2 times a day. 90 tablet 3   • atorvastatin (Lipitor) 20 mg tablet Take 1 tablet (20 mg) by mouth once daily. 90 tablet 3   • ergocalciferol (Vitamin D-2) 1.25 MG (04826 UT) capsule Take 1 capsule (1,250 mcg) by mouth 1 (one) time per week.     • famciclovir (Famvir) 125 mg tablet Take 1 tablet (125 mg) by mouth once daily.     • hypromellose (Vista Gonio) 2.5 % ophthalmic solution Administer 1 drop into both eyes 4 times a day as needed for dry eyes. (Patient not taking: Reported on 12/30/2024)     • lidocaine (Lidocare) 4 % patch Place 1 patch on the skin once daily. Remove & discard patch within 12 hours or as directed by MD. on the right hip and right shoulder     • melatonin 3 mg tablet Take 1 tablet (3 mg) by mouth once daily at bedtime.     • metoprolol succinate XL (Toprol-XL) 25 mg 24 hr tablet Take 1 tablet (25 mg) by mouth once daily. Do not crush or chew. 30 tablet 11   • nitrofurantoin, macrocrystal-monohydrate, (Macrobid) 100 mg capsule Take 1 capsule (100 mg) by mouth 2 times a day. Do not fill before December 28, 2024.     • potassium chloride CR 20 mEq ER tablet Take 1 tablet (20 mEq) by mouth once daily. Do not crush or chew.     • QUEtiapine (SEROquel) 25 mg tablet Take 0.5 tablets (12.5 mg) by mouth once daily as needed (agitation).     • thiamine 100 mg tablet Take 1 tablet (100 mg) by mouth 3 times a day.       No current facility-administered medications for this visit.       Objective  There were no vitals taken for this visit.  Physical  Exam  Vitals and nursing note reviewed.   Constitutional:       Comments: Pt refused to be examined today. Confused, inattentive           Labs:  Most current labs reviewed: no recent labs ordered      Imaging:   Most current imaging studies reviewed: no recent imaging studies ordered      Assessment/Plan   Problem List Items Addressed This Visit             ICD-10-CM    Benign essential hypertension I10     - uncontrolled. Increase metoprolol succinate to 50 mg daily. Hold for SBP <110 or HR <60.           Urinary retention R33.9     - voiding trial on 1/6, 2 days after abx ends         Urinary tract infection associated with indwelling urethral catheter (CMS-[facility]) - Primary T83.511A, N39.0     Ending nitrofurantoin on 1/4            Discussed case with:  facility staff (nurse, aid, PT/OT/SLP, SW and/or DON) and Geriatrics care team        Electronically Signed By: Kayla Hernandez MD   1/2/25  7:57 PM

## 2025-01-06 ENCOUNTER — NURSING HOME VISIT (OUTPATIENT)
Dept: POST ACUTE CARE | Facility: EXTERNAL LOCATION | Age: 80
End: 2025-01-06
Payer: MEDICARE

## 2025-01-06 ENCOUNTER — HOSPITAL ENCOUNTER (OUTPATIENT)
Dept: RADIOLOGY | Facility: HOSPITAL | Age: 80
Discharge: HOME | End: 2025-01-06
Payer: MEDICARE

## 2025-01-06 VITALS
OXYGEN SATURATION: 96 % | BODY MASS INDEX: 22.92 KG/M2 | HEART RATE: 80 BPM | RESPIRATION RATE: 18 BRPM | SYSTOLIC BLOOD PRESSURE: 159 MMHG | TEMPERATURE: 97.9 F | WEIGHT: 142 LBS | DIASTOLIC BLOOD PRESSURE: 82 MMHG

## 2025-01-06 DIAGNOSIS — M25.551 RIGHT HIP PAIN: ICD-10-CM

## 2025-01-06 DIAGNOSIS — S42.201D CLOSED FRACTURE OF PROXIMAL END OF RIGHT HUMERUS WITH ROUTINE HEALING, UNSPECIFIED FRACTURE MORPHOLOGY, SUBSEQUENT ENCOUNTER: ICD-10-CM

## 2025-01-06 DIAGNOSIS — N18.31 STAGE 3A CHRONIC KIDNEY DISEASE (MULTI): ICD-10-CM

## 2025-01-06 DIAGNOSIS — E55.9 VITAMIN D DEFICIENCY: ICD-10-CM

## 2025-01-06 DIAGNOSIS — I10 BENIGN ESSENTIAL HYPERTENSION: ICD-10-CM

## 2025-01-06 DIAGNOSIS — E87.1 HYPONATREMIA: ICD-10-CM

## 2025-01-06 DIAGNOSIS — R33.9 URINARY RETENTION: Primary | ICD-10-CM

## 2025-01-06 DIAGNOSIS — I48.0 PAROXYSMAL ATRIAL FIBRILLATION (MULTI): ICD-10-CM

## 2025-01-06 PROCEDURE — 73502 X-RAY EXAM HIP UNI 2-3 VIEWS: CPT | Mod: RT

## 2025-01-06 PROCEDURE — 73502 X-RAY EXAM HIP UNI 2-3 VIEWS: CPT | Mod: RIGHT SIDE | Performed by: RADIOLOGY

## 2025-01-06 PROCEDURE — 99308 SBSQ NF CARE LOW MDM 20: CPT | Performed by: CLINICAL NURSE SPECIALIST

## 2025-01-06 NOTE — LETTER
"Patient: Raúl Garcia  : 1945    Encounter Date: 2025    Reason for visit: follow up on UTI and urinary retention    HPI: Raúl was seen today in his room at [facility]; he has finished the Macrobid 2 days ago for the uti and the kitchen was removed this am. He has continued to be confused despite being treated for the uti. he fell again yesterday but there was no injury noted.    Chief Complaint: “I am feeling fine. I would think that little cat was messing with my call light\"    Review of Systems   General: no fever or chills, does not feel ill;  feels he is sleeping at night, not tired during the day  Skin: left shin abrasion; few bruises  EENT:  no blurred vision; no soreness of throat; no trouble chewing or swallowing  Respiratory: no cough, congestion or shortness of breath  Cardiac: no chest pain or palpitations  Gastrointestinal: no abdominal pain, nausea, vomiting, heartburn, diarrhea or constipation; BM every day without straining  Genitourinary: recalls kitchen being removed; hasn't voided since, no discomfort  Musculoskeletal: right shoulder and left hip sore; no limitation of ROM except for right arm limited due to pain  Neurologic: feels his memory is good, does not think it is unusual for a cat to be here; no headaches, dizziness or lightheadedness; no tremors or involuntary movements; no altered sensation; right arm weaker  Psychiatric: denies any worries    Physical Examination   Vitals: Blood pressure 159/82, pulse 80, temperature 36.6 °C (97.9 °F), resp. rate 18, weight 64.4 kg (142 lb), SpO2 96%.\  General: sitting in wheelchair in his room, calm, telling me about a cat he saw in his room earlier; able to answer other questions appropriately; ate all of his breakfast  Neurologic: alert, oriented x2, mixed up about his situation; long term memory intact; speech clear and fluent; facial features symmetrical and tongue midline; equal peripheral strength, tone and sensation  Skin: no " abnormalities of hair or nails; right shoulder and hand with fading bruising; left shin laterally with scabbed abrasions   HEENT: no trauma, bruises, swelling; adequate hearing of close conversation; adequate vision for ADLs; no eye/ear/nasal drainage or irritation; oral mucosa moist and pink w/o lesions or discolorations; no coughing while swallowing  Respiratory: unlabored; chest symmetrical w/ good rise and fall; no coughing, breath sounds equal and clear throughout  Heart: irregular rhythm, rate controlled; normal heart sounds w/ no murmurs, rubs and gallops  Abdomen: nondistended, nontenderness, bowel sounds active x4, no masses/hernias  Musculoskeletal: right arm without edema, has some limited ROM at shoulder; good use of both legs; adequate ROM of left arm and leg and neck, no kyphosis or scoliosis; able to sit upright unassisted and able to feed himself and perform ADLs from wheelchair  PV: 1+ edema top of socks only; skin warm, dry, no cyanosis, no clubbing of nails; palpable radial and pedal pulses  : deferred  Psych: calm, cooperative; visual hallucination possible    Diagnoses and Plan:   Urinary retention with UTI - finished macrobid 2 days ago; kitchen removed few hours ago; with first void nurse to do post void bladder scan and call results  Htn and ckd stage 3b- toprol increased due to elevated SBP but not noting improvement in SBP, still in 140s most of the time; continue as is, could adjust further but ok for now; repeat creatinine remains 1.5 with gfr 45  Hyponatremia - repeat Na 134 last week  Closed fracture of proximal end of right humerus with routine healing / right bowen-orthotic femoral head fracture - hasn't been cooperative with treatment but seems to be healing as he has less pain and more use of extremities; reports some new pain left hip but no bruising and he was able to move it without increased pain; PT to report any increase in pain today since he fell yesterday  subacute delirium  with hx daily alcohol use - generally confused daily, delusions and hallucinations intermittently; does not seem different than prior to uti treatment  5. Hypokalemia - on KCL supplement for K 3.3; repeat K 4.5 last week  6. Paroxysmal afib - on toprol and eliquis; stable  7. Vitamin D deficiency - on D supplement  8. Anemia - multifactorial; no treatment; hgb 10.3 last week    Nurse to call later if high post void residual.       Electronically Signed By: TAMEKA RobbinsCNS   1/7/25  7:22 PM

## 2025-01-07 PROBLEM — Z97.8 FOLEY CATHETER IN PLACE: Status: RESOLVED | Noted: 2024-12-10 | Resolved: 2025-01-07

## 2025-01-07 PROBLEM — N39.0 URINARY TRACT INFECTION ASSOCIATED WITH INDWELLING URETHRAL CATHETER (CMS-HCC): Status: RESOLVED | Noted: 2024-12-27 | Resolved: 2025-01-07

## 2025-01-07 PROBLEM — T83.511A URINARY TRACT INFECTION ASSOCIATED WITH INDWELLING URETHRAL CATHETER (CMS-HCC): Status: RESOLVED | Noted: 2024-12-27 | Resolved: 2025-01-07

## 2025-01-07 NOTE — PROGRESS NOTES
"Reason for visit: follow up on UTI and urinary retention    HPI: Raúl was seen today in his room at [facility]; he has finished the Macrobid 2 days ago for the uti and the kitchen was removed this am. He has continued to be confused despite being treated for the uti. he fell again yesterday but there was no injury noted.    Chief Complaint: “I am feeling fine. I would think that little cat was messing with my call light\"    Review of Systems   General: no fever or chills, does not feel ill;  feels he is sleeping at night, not tired during the day  Skin: left shin abrasion; few bruises  EENT:  no blurred vision; no soreness of throat; no trouble chewing or swallowing  Respiratory: no cough, congestion or shortness of breath  Cardiac: no chest pain or palpitations  Gastrointestinal: no abdominal pain, nausea, vomiting, heartburn, diarrhea or constipation; BM every day without straining  Genitourinary: recalls kitchen being removed; hasn't voided since, no discomfort  Musculoskeletal: right shoulder and left hip sore; no limitation of ROM except for right arm limited due to pain  Neurologic: feels his memory is good, does not think it is unusual for a cat to be here; no headaches, dizziness or lightheadedness; no tremors or involuntary movements; no altered sensation; right arm weaker  Psychiatric: denies any worries    Physical Examination   Vitals: Blood pressure 159/82, pulse 80, temperature 36.6 °C (97.9 °F), resp. rate 18, weight 64.4 kg (142 lb), SpO2 96%.\  General: sitting in wheelchair in his room, calm, telling me about a cat he saw in his room earlier; able to answer other questions appropriately; ate all of his breakfast  Neurologic: alert, oriented x2, mixed up about his situation; long term memory intact; speech clear and fluent; facial features symmetrical and tongue midline; equal peripheral strength, tone and sensation  Skin: no abnormalities of hair or nails; right shoulder and hand with fading " bruising; left shin laterally with scabbed abrasions   HEENT: no trauma, bruises, swelling; adequate hearing of close conversation; adequate vision for ADLs; no eye/ear/nasal drainage or irritation; oral mucosa moist and pink w/o lesions or discolorations; no coughing while swallowing  Respiratory: unlabored; chest symmetrical w/ good rise and fall; no coughing, breath sounds equal and clear throughout  Heart: irregular rhythm, rate controlled; normal heart sounds w/ no murmurs, rubs and gallops  Abdomen: nondistended, nontenderness, bowel sounds active x4, no masses/hernias  Musculoskeletal: right arm without edema, has some limited ROM at shoulder; good use of both legs; adequate ROM of left arm and leg and neck, no kyphosis or scoliosis; able to sit upright unassisted and able to feed himself and perform ADLs from wheelchair  PV: 1+ edema top of socks only; skin warm, dry, no cyanosis, no clubbing of nails; palpable radial and pedal pulses  : deferred  Psych: calm, cooperative; visual hallucination possible    Diagnoses and Plan:   Urinary retention with UTI - finished macrobid 2 days ago; kitchen removed few hours ago; with first void nurse to do post void bladder scan and call results  Htn and ckd stage 3b- toprol increased due to elevated SBP but not noting improvement in SBP, still in 140s most of the time; continue as is, could adjust further but ok for now; repeat creatinine remains 1.5 with gfr 45  Hyponatremia - repeat Na 134 last week  Closed fracture of proximal end of right humerus with routine healing / right bowen-orthotic femoral head fracture - hasn't been cooperative with treatment but seems to be healing as he has less pain and more use of extremities; reports some new pain left hip but no bruising and he was able to move it without increased pain; PT to report any increase in pain today since he fell yesterday  subacute delirium with hx daily alcohol use - generally confused daily, delusions and  hallucinations intermittently; does not seem different than prior to uti treatment  5. Hypokalemia - on KCL supplement for K 3.3; repeat K 4.5 last week  6. Paroxysmal afib - on toprol and eliquis; stable  7. Vitamin D deficiency - on D supplement  8. Anemia - multifactorial; no treatment; hgb 10.3 last week    Nurse to call later if high post void residual.

## 2025-01-10 ENCOUNTER — OFFICE VISIT (OUTPATIENT)
Dept: ORTHOPEDIC SURGERY | Facility: HOSPITAL | Age: 80
End: 2025-01-10
Payer: MEDICARE

## 2025-01-10 ENCOUNTER — DOCUMENTATION (OUTPATIENT)
Dept: POST ACUTE CARE | Facility: EXTERNAL LOCATION | Age: 80
End: 2025-01-10

## 2025-01-10 DIAGNOSIS — M97.01XA PERIPROSTHETIC FRACTURE AROUND INTERNAL PROSTHETIC RIGHT HIP JOINT, INITIAL ENCOUNTER: Primary | ICD-10-CM

## 2025-01-10 PROCEDURE — 99215 OFFICE O/P EST HI 40 MIN: CPT | Mod: 25 | Performed by: STUDENT IN AN ORGANIZED HEALTH CARE EDUCATION/TRAINING PROGRAM

## 2025-01-10 PROCEDURE — 99215 OFFICE O/P EST HI 40 MIN: CPT | Performed by: STUDENT IN AN ORGANIZED HEALTH CARE EDUCATION/TRAINING PROGRAM

## 2025-01-10 PROCEDURE — 1123F ACP DISCUSS/DSCN MKR DOCD: CPT | Performed by: STUDENT IN AN ORGANIZED HEALTH CARE EDUCATION/TRAINING PROGRAM

## 2025-01-10 NOTE — PROGRESS NOTES
Jazmin Lechuga MD   Adult Reconstruction and Joint Replacement Surgery  Phone: [phone]     Fax: [phone]       Name: Raúl Garcia  Age: 79 y.o.   : 1945   Date of Visit: 1/10/2025      INITIAL CONSULTATION    CC: Right hip pain    HPI:  This patient presents with several weeks of RIGHT hip pain. He sustained a fall on 24 and was found to have right greater trochanteric periprosthetic fracture.  He unfortunately also fractured his right shoulder.    Patient has tried the following Activity modification, Tylenol (arthritis dosing) , Physical therapy, and Xray. Patient does not have pain at night. Patient does report falls related to this problem. Patient is able to walk indoors only. Patient is currently using walker as assistive device. Primarily complains of lateral hip pain. Patient has difficulty with donning and doffing shoes and socks, stairs, and getting in/out of car . The pain is significantly impacting their ability to perform activities of daily living. Patient reports no longer able to do activities such as walking longer distances, ADLs.     The patient reports no fever, chills or night sweats. No wound drainage. No interval trauma. No recent dental work.     Implants in place: Suspect Ricardo gerard  Date of initial surgery: 2019    PROMs/HISTORY  PROMs   No questionnaires on file.     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 impaction    Other conditions influencing health status 2014    Blockage of ear    Personal history of colonic polyps 2020    History of adenomatous polyp of colon    Personal history of other diseases of the musculoskeletal system and connective tissue     Personal history of arthritis    Personal history of other diseases of the nervous system and sense organs     History of cataract       Past Medical History:   Diagnosis Date    Impacted cerumen,  bilateral 12/07/2016    Bilateral hearing loss due to cerumen impaction    Other conditions influencing health status 01/20/2014    Blockage of ear    Personal history of colonic polyps 05/13/2020    History of adenomatous polyp of colon    Personal history of other diseases of the musculoskeletal system and connective tissue     Personal history of arthritis    Personal history of other diseases of the nervous system and sense organs     History of cataract     Documented in chart and reviewed.     Past Surgical History:   Procedure Laterality Date    CATARACT EXTRACTION  02/19/2014    Cataract Surgery    OTHER SURGICAL HISTORY  01/20/2014    Leg Repair    OTHER SURGICAL HISTORY  05/08/2019    Hip replacement    TOTAL HIP ARTHROPLASTY  04/12/2017    Hip Replacement       Allergies: He is allergic to amlodipine, ibuprofen, and penicillins.     Medications:  Current Outpatient Medications   Medication Instructions    acetaminophen (TYLENOL) 1,000 mg, 3 times daily    apixaban (ELIQUIS) 2.5 mg, oral, 2 times daily    atorvastatin (LIPITOR) 20 mg, oral, Daily    ergocalciferol (VITAMIN D-2) 1.25 mg, Weekly    famciclovir (FAMVIR) 125 mg, Daily    hypromellose (Vista Gonio) 2.5 % ophthalmic solution 1 drop, 4 times daily PRN    lidocaine (Lidocare) 4 % patch 1 patch, Daily    melatonin 3 mg, Nightly    metoprolol succinate XL (TOPROL-XL) 25 mg, oral, Daily, Do not crush or chew.    metoprolol succinate XL (TOPROL-XL) 50 mg, Daily    multivitamin tablet 1 tablet, Daily    potassium chloride CR 20 mEq ER tablet 20 mEq, Daily       No family history on file.  Documented in chart and reviewed.     Social History     Tobacco Use    Smoking status: Never     Passive exposure: Never    Smokeless tobacco: Never   Substance Use Topics    Alcohol use: Yes     Alcohol/week: 14.0 standard drinks of alcohol     Types: 14 Standard drinks or equivalent per week     Comment: Occasionally        Review of Systems: Review of systems  completed with medical assistant intake. Please refer to this note.     Physical Exam:  BMI: 23.    General: The patient is well appearing and has an appropriate affect.     Neurological Examination: SILT in SPN/DPN/Sural/Saphenous/Tibial nerves. 5/5 EHL, FHL, Tibial anterior, Gastrocnemius. Coordination grossly intact.     Cardiovascular Exam: Capillary refill <2 seconds.     Lymphatic Examination: There is no obvious lymphatic swelling present around the involved joint.    Skin Exam: Skin around the pertinent joint is without evidence of infection or rash.    Gait: The patient ambulates with a coxalgic gait.     Lumbar spine:    No tenderness to palpation midline    Negative straight leg raise bilaterally    Right Hip Examination:  Gait: Coxalgic gait.    Examination of the hip reveals the skin to be intact.  Healed posterior incision.    There is mild tenderness over the greater trochanter.  There is no significant crepitation.  There is mild increased swelling over the trochanteric bursa.    There is no obvious swelling.    There is a negative Stinchfield test.    Range of motion is: full extension to 90 degrees of flexion.    The hip internally rotates to 10 degrees and externally rotates to 35 degrees.    Abduction is 35 degrees and adduction is 10 degrees.    There is groin and buttock pain with hip motion.    Abductor strength 4/5.    Left Hip Examination:  Examination of the hip reveals the skin to be intact.  Healed posterior incision.    There is no tenderness over the greater trochanter.    There is no obvious swelling.    There is a negative Stinchfield test.    Range of motion is full extension to 90 degrees of flexion.    The hip internally rotates to 20 and externally rotates 40 degrees.    Abduction is 40 degrees and adduction is 20 degrees.    There is no groin and buttock pain with hip motion.    Abductor strength 4/5.    Right Knee Examination:  Examination of the right knee reveals the skin to  "be intact. There is no obvious swelling.    There is no tenderness to palpation.    Range of motion is 5-115 degrees of flexion.    The knee is stable.    There is no grinding with range of motion.    There is no patellofemoral crepitus.    Left Knee Examination:  Examination of the left knee reveals the skin to be intact. There is no obvious swelling.    There is no tenderness to palpation.    Range of motion is 5-115 degrees of flexion.    The knee is stable.    There is no grinding with range of motion.    There is no patellofemoral crepitus.    Prior Labs:   Prior Labs:   Lab Results   Component Value Date    WBC 6.3 06/26/2024    HGB 11.5 (L) 06/26/2024    HCT 32.8 (L) 06/26/2024    MCV 88 06/26/2024     06/26/2024      Lab Results   Component Value Date    INR 1.0 05/07/2024    INR 1.0 02/28/2019    PROTIME 10.9 05/07/2024    PROTIME 11.0 02/28/2019         Lab Results   Component Value Date    GLUCOSE 94 08/28/2024    CALCIUM 9.1 08/28/2024     (L) 08/28/2024    K 4.6 08/28/2024    CO2 25 08/28/2024    CL 99 08/28/2024    BUN 18 08/28/2024    CREATININE 1.34 (H) 08/28/2024      No results found for: \"CKTOTAL\", \"CKMB\", \"CKMBINDEX\", \"TROPONINI\"   Lab Results   Component Value Date    HGBA1C 5.5 12/05/2024         No results found for: \"CRP\"   No results found for: \"SEDRATE\"      Radiographs:  Radiographs were personally reviewed today with the patient. There is a primary cemented total hip arthroplasty in place. Acetabular component is well-aligned and without obvious evidence of bowen-implant lucency, osteolysis, fracture or gross failure. One-screw supplementary acetabular fixation. Femoral component is in neutral alignment and without obvious evidence of bowen-implant lucency, osteolysis, or gross failure.  There is a Pringle type AG periprosthetic fracture around the hip replacement.  There is mild displacement of the fracture fragment, best seen on the lateral view.    Impression:  This " patient presents with several weeks of RIGHT hip pain.    Diagnosis:   Periprosthetic fracture around internal prosthetic right hip joint, initial encounter     Recommendations / Plan:    Clinical findings and imaging results were reviewed with the patient today.  The patient unfortunately sustained a periprosthetic fracture about the right hip replacement.  There is no apparent compromise of the implants.  At this point, there is moderate displacement of the greater trochanteric fragment.  There is no acute indication for surgical intervention.    I have discussed the options in detail with the patient. We have discussed anti-inflammatory medication, activity modification, physical therapy, and revision total hip replacement surgery. The patient is not indicated for surgery at this time.     Patient may advance to flatfoot weightbearing through the right lower extremity as long as pain is tolerated.  Patient is to follow abductor and posterior precautions.    The patient will transition to home physical therapy.  Patient will continue their home exercise program. Strategies for pain management using over-the-counter anti-inflammatory medications reviewed.  Encouraged them to maintain range of motion and strength around the hip and knee joints within the constraints above.  They will continue to implement these strategies in addressing their pain.  The patient verbalizes understanding with the recommendations and treatment plan as outlined above and is in agreement.  Questions were addressed.    _____________  Jazmin Lechuga MD   Attending Orthopaedic Surgeon  [facility]    [facility]       This office note was transcribed with dictation software.  Please excuse any typographical errors, program misunderstandings leading to inadvertent insertions or deletions of inappropriate wording, pronoun errors and other unintentional transcription errors not noticed on  proof-reading.

## 2025-01-10 NOTE — PROGRESS NOTES
PVR today is zero. Pt remains off of kitchen catheter.  Also BUN 34, Cr 1.7. Has DANUTA. He is encouraged to increase fluid intake. Wife at bedside and aware.

## 2025-01-13 ENCOUNTER — DOCUMENTATION (OUTPATIENT)
Dept: POST ACUTE CARE | Facility: EXTERNAL LOCATION | Age: 80
End: 2025-01-13
Payer: MEDICARE

## 2025-01-13 ENCOUNTER — NURSING HOME VISIT (OUTPATIENT)
Dept: POST ACUTE CARE | Facility: EXTERNAL LOCATION | Age: 80
End: 2025-01-13
Payer: MEDICARE

## 2025-01-13 VITALS
RESPIRATION RATE: 16 BRPM | WEIGHT: 142 LBS | TEMPERATURE: 97.7 F | SYSTOLIC BLOOD PRESSURE: 109 MMHG | BODY MASS INDEX: 22.92 KG/M2 | HEART RATE: 66 BPM | OXYGEN SATURATION: 98 % | DIASTOLIC BLOOD PRESSURE: 70 MMHG

## 2025-01-13 DIAGNOSIS — G47.00 INSOMNIA, UNSPECIFIED TYPE: Primary | ICD-10-CM

## 2025-01-13 DIAGNOSIS — S72.001G CLOSED FRACTURE OF RIGHT HIP WITH DELAYED HEALING, SUBSEQUENT ENCOUNTER: ICD-10-CM

## 2025-01-13 DIAGNOSIS — I10 BENIGN ESSENTIAL HYPERTENSION: ICD-10-CM

## 2025-01-13 DIAGNOSIS — I48.0 PAROXYSMAL ATRIAL FIBRILLATION (MULTI): ICD-10-CM

## 2025-01-13 PROCEDURE — 99308 SBSQ NF CARE LOW MDM 20: CPT | Performed by: CLINICAL NURSE SPECIALIST

## 2025-01-13 RX ORDER — TALC
5 POWDER (GRAM) TOPICAL NIGHTLY
Qty: 45 TABLET | Refills: 0 | Status: SHIPPED | OUTPATIENT
Start: 2025-01-13 | End: 2025-02-12

## 2025-01-13 NOTE — PROGRESS NOTES
I was told by facility SW that pt has been LTC/respite care since 1/3. Pt plans to dc to home on 1/18.

## 2025-01-13 NOTE — LETTER
"Patient: Raúl Garcia  : 1945    Encounter Date: 2025    Reason for visit: follow up for insomnia reported by nurse / no sleep in nearly 3 days    HPI: Raúl was seen today in his room at [facility] after the nurse reported he was wide awake all night the past 2 nights with no daytime sleeping. He is currently getting 3mg melatonin at     Chief Complaint: “I am feeling fine. I am looking for room 660\"    Review of Systems   General: no fever or chills, does not feel ill; doesn't recall being awake all night; not tired during the day  Skin: left shin abrasion; few bruises  EENT:  no blurred vision; no soreness of throat; no trouble chewing or swallowing  Respiratory: no cough, congestion or shortness of breath  Cardiac: no chest pain or palpitations  Gastrointestinal: no abdominal pain, nausea, vomiting, heartburn, diarrhea or constipation; BM every day without straining  Genitourinary: voiding fine he thinks; no discomfort  Musculoskeletal: right shoulder and left hip sore; no limitation of ROM except for right arm limited due to pain  Neurologic: feels his memory is good,  no headaches, dizziness or lightheadedness; no tremors or involuntary movements; no altered sensation; right arm weaker  Psychiatric: denies any worries    Physical Examination   Vitals: Blood pressure 109/70, pulse 66, temperature 36.5 °C (97.7 °F), resp. rate 16, weight 64.4 kg (142 lb), SpO2 98%.  General: sitting in wheelchair in the hallway by his room, looking for his room, talkative and confused, able to answer some questions appropriately; cooperative with shower today  Neurologic: alert, oriented x2, mixed up about his situation; long term memory intact; speech clear and fluent; facial features symmetrical and tongue midline; equal peripheral strength, tone and sensation  Skin: no abnormalities of hair or nails; right shoulder and hand with fading bruising; left shin laterally with scabbed abrasions   HEENT: no trauma, " bruises, swelling; adequate hearing of close conversation; adequate vision for ADLs; no eye/ear/nasal drainage or irritation; oral mucosa moist and pink w/o lesions or discolorations; no coughing while swallowing  Respiratory: unlabored; chest symmetrical w/ good rise and fall; no coughing, breath sounds equal and clear throughout  Heart: irregular rhythm, rate controlled; normal heart sounds w/ no murmurs, rubs and gallops  Abdomen: nondistended, nontenderness, bowel sounds active x4, no masses/hernias  Musculoskeletal: right arm without edema, has some limited ROM at shoulder; good use of both legs; adequate ROM of left arm and leg and neck, no kyphosis or scoliosis; able to sit upright unassisted and able to feed himself and perform ADLs from wheelchair  PV: 1+ edema top of socks only; skin warm, dry, no cyanosis, no clubbing of nails; palpable radial and pedal pulses  : deferred  Psych: calm, cooperative; visual hallucination possible    Diagnoses and Plan:   Insomnia - no sleep in 2 days; increase melatonin to 5mg and add 2 tylenol routinely at night; if he does not sleep tonight then nurse to call me or  tomorrow   Urinary retention - residual was zero few days ago; hard to say how he is voiding since he uses the bathroom on his own usually  Htn and ckd stage 3b- toprol increased last week due to elevated SBP; some improvement in SBP and  he is tolerating it, still in 140s most of the time; continue as is; awaiting repeat renal function panel today  Closed fracture of proximal end of right humerus with routine healing / right bowen-orthotic femoral head fracture - WBAT but for his right leg he has to only stand on it flat footed for now after visit with ortho few days ago.  subacute delirium with hx daily alcohol use - generally confused daily, delusions and hallucinations intermittently but brief/mild; seems to be a little improved but hard to say if just due to starting to get familiar with surroundings  and routine here  5. Hypokalemia - on KCL supplement for K 3.3; repeat K 4.5 last week  6. Paroxysmal afib - on toprol and eliquis; stable        Electronically Signed By: ROHINI Robbins   1/13/25 10:05 PM

## 2025-01-14 NOTE — PROGRESS NOTES
"Reason for visit: follow up for insomnia reported by nurse / no sleep in nearly 3 days    HPI: Raúl was seen today in his room at [facility] after the nurse reported he was wide awake all night the past 2 nights with no daytime sleeping. He is currently getting 3mg melatonin at     Chief Complaint: “I am feeling fine. I am looking for room 660\"    Review of Systems   General: no fever or chills, does not feel ill; doesn't recall being awake all night; not tired during the day  Skin: left shin abrasion; few bruises  EENT:  no blurred vision; no soreness of throat; no trouble chewing or swallowing  Respiratory: no cough, congestion or shortness of breath  Cardiac: no chest pain or palpitations  Gastrointestinal: no abdominal pain, nausea, vomiting, heartburn, diarrhea or constipation; BM every day without straining  Genitourinary: voiding fine he thinks; no discomfort  Musculoskeletal: right shoulder and left hip sore; no limitation of ROM except for right arm limited due to pain  Neurologic: feels his memory is good,  no headaches, dizziness or lightheadedness; no tremors or involuntary movements; no altered sensation; right arm weaker  Psychiatric: denies any worries    Physical Examination   Vitals: Blood pressure 109/70, pulse 66, temperature 36.5 °C (97.7 °F), resp. rate 16, weight 64.4 kg (142 lb), SpO2 98%.  General: sitting in wheelchair in the hallway by his room, looking for his room, talkative and confused, able to answer some questions appropriately; cooperative with shower today  Neurologic: alert, oriented x2, mixed up about his situation; long term memory intact; speech clear and fluent; facial features symmetrical and tongue midline; equal peripheral strength, tone and sensation  Skin: no abnormalities of hair or nails; right shoulder and hand with fading bruising; left shin laterally with scabbed abrasions   HEENT: no trauma, bruises, swelling; adequate hearing of close conversation; adequate " vision for ADLs; no eye/ear/nasal drainage or irritation; oral mucosa moist and pink w/o lesions or discolorations; no coughing while swallowing  Respiratory: unlabored; chest symmetrical w/ good rise and fall; no coughing, breath sounds equal and clear throughout  Heart: irregular rhythm, rate controlled; normal heart sounds w/ no murmurs, rubs and gallops  Abdomen: nondistended, nontenderness, bowel sounds active x4, no masses/hernias  Musculoskeletal: right arm without edema, has some limited ROM at shoulder; good use of both legs; adequate ROM of left arm and leg and neck, no kyphosis or scoliosis; able to sit upright unassisted and able to feed himself and perform ADLs from wheelchair  PV: 1+ edema top of socks only; skin warm, dry, no cyanosis, no clubbing of nails; palpable radial and pedal pulses  : deferred  Psych: calm, cooperative; visual hallucination possible    Diagnoses and Plan:   Insomnia - no sleep in 2 days; increase melatonin to 5mg and add 2 tylenol routinely at night; if he does not sleep tonight then nurse to call me or Dr tomorrow   Urinary retention - residual was zero few days ago; hard to say how he is voiding since he uses the bathroom on his own usually  Htn and ckd stage 3b- toprol increased last week due to elevated SBP; some improvement in SBP and  he is tolerating it, still in 140s most of the time; continue as is; awaiting repeat renal function panel today  Closed fracture of proximal end of right humerus with routine healing / right bowen-orthotic femoral head fracture - WBAT but for his right leg he has to only stand on it flat footed for now after visit with ortho few days ago.  subacute delirium with hx daily alcohol use - generally confused daily, delusions and hallucinations intermittently but brief/mild; seems to be a little improved but hard to say if just due to starting to get familiar with surroundings and routine here  5. Hypokalemia - on KCL supplement for K 3.3;  repeat K 4.5 last week  6. Paroxysmal afib - on toprol and eliquis; stable

## 2025-01-16 ENCOUNTER — NURSING HOME VISIT (OUTPATIENT)
Dept: POST ACUTE CARE | Facility: EXTERNAL LOCATION | Age: 80
End: 2025-01-16
Payer: MEDICARE

## 2025-01-16 VITALS
SYSTOLIC BLOOD PRESSURE: 155 MMHG | OXYGEN SATURATION: 98 % | TEMPERATURE: 97.1 F | DIASTOLIC BLOOD PRESSURE: 70 MMHG | RESPIRATION RATE: 20 BRPM | HEART RATE: 68 BPM

## 2025-01-16 DIAGNOSIS — I48.0 PAROXYSMAL ATRIAL FIBRILLATION (MULTI): ICD-10-CM

## 2025-01-16 DIAGNOSIS — N18.30 STAGE 3 CHRONIC KIDNEY DISEASE, UNSPECIFIED WHETHER STAGE 3A OR 3B CKD (MULTI): Primary | ICD-10-CM

## 2025-01-16 DIAGNOSIS — R00.1 BRADYCARDIA: ICD-10-CM

## 2025-01-16 DIAGNOSIS — R55 SYNCOPE, UNSPECIFIED SYNCOPE TYPE: ICD-10-CM

## 2025-01-16 PROCEDURE — 99316 NF DSCHRG MGMT 30 MIN+: CPT | Performed by: CLINICAL NURSE SPECIALIST

## 2025-01-16 RX ORDER — SODIUM BICARBONATE 650 MG/1
650 TABLET ORAL DAILY
Qty: 90 TABLET | Refills: 3 | Status: SHIPPED | OUTPATIENT
Start: 2025-01-16 | End: 2026-01-16

## 2025-01-16 NOTE — PROGRESS NOTES
"Reason for visit: assessment for discharge    Admitted from: home  Facility: [facility]  Dates of hospitalization: 12/4/2024-12/7/2024  Dates of [place] rehab: 12/07/2024 to 01/17/2025  Hospital Course:  Mr. Raúl Gacria 79 y.o. year old male was admitted for fall at a restaurant, sustaining a   R femur periprosthetic greater trochanter fx, R humeral head fx; metabolic acidosis, leukocytosis. He was stabilized and it was ordered by ortho to be nonweight bearing to his arm and TTWB right leg until a follow up visit. He had some hospital psychoses at [place] and also a kitchen was placed presumed due to immobility. His lab workup was ok other than mild elevation of creatinine and cervical xray showed degenerative changes.   While in rehab Raúl had a few non-injury falls. He has also remained confused a good part of the time. He was treated for another UTI here but that was after he had either pulled the kitchen out or disconnected the tube a few times. After treating the uti Raúl also developed mild urinary retention but after reinserting the kitchen and giving it a little more time, the kitchen was removed and he did not have significant urinary residual.   A return visit to the orthopedic surgeon last week allowed him to use his right arm and to advance to flatfoot weightbearing through the right lower extremity as long as pain is tolerated. Patient is to follow abductor and posterior precautions.   He is now ready to discharge home and will continue PT and OT.  Stage 3 ckd has been a concern while here but it has been stable; he is for another bmp today.     Recent HPI: Raúl was seen today in his room at [facility] to assess readiness for discharge and any last minute needs.     Chief Complaint: “I am feeling fine. I am looking for the exit\"    Review of Systems   General: no fever or chills, does not feel ill; doesn't recall how he is sleeping; not tired during the day  Skin: left shin abrasion; few " "bruises  EENT:  no blurred vision; no soreness of throat; no trouble chewing or swallowing  Respiratory: no cough, congestion or shortness of breath  Cardiac: no chest pain or palpitations  Gastrointestinal: no abdominal pain, nausea, vomiting, heartburn, diarrhea or constipation; BM every day without straining  Genitourinary: voiding fine he thinks; no discomfort  Musculoskeletal: right shoulder and both hips sore; no limitation of ROM except for right arm limited due to pain  Neurologic: feels his memory is good, no headaches, dizziness or lightheadedness; no tremors or involuntary movements; no altered sensation; right arm weaker  Psychiatric: says he is a little stressed about work and work orders right now and is about to just give up    Physical Examination   Vitals: Blood pressure 155/70, pulse 68, temperature 36.2 °C (97.1 °F), resp. rate 20, SpO2 98%.  142# which has remained stable while here  General: sitting in wheelchair in the hallway by his room, a bit delusional talking about being at work and was not able to eat breakfast due to   \"Thinking about how much needs done\";  talkative and confused, able to answer some questions appropriately; has been cooperative with shower and care  Neurologic: alert, oriented x1, mixed up about his situation, does not know where he is, thinking he is at work; long term memory intact; speech clear and fluent; facial features symmetrical and tongue midline; equal peripheral strength, tone and sensation  Skin: no abnormalities of hair or nails; right shoulder and hand with fading bruising; left shin laterally with scabbed abrasions   HEENT: no trauma, bruises, swelling; adequate hearing of close conversation; adequate vision for ADLs; no eye/ear/nasal drainage or irritation; oral mucosa moist and pink w/o lesions or discolorations; no coughing while swallowing  Respiratory: unlabored; chest symmetrical w/ good rise and fall; no coughing, breath sounds equal and clear " throughout  Heart: irregular rhythm, rate controlled; normal heart sounds w/ no murmurs, rubs and gallops  Abdomen: nondistended, nontenderness, bowel sounds active x4, no masses/hernias; last BM yesterday  Musculoskeletal: right arm without edema, has some limited ROM at shoulder; good use of both legs; adequate ROM of left arm and leg and neck, no kyphosis or scoliosis; able to sit upright unassisted and able to feed himself and perform ADLs from wheelchair; often slouches in wheelchair  PV: 1+ edema top of socks only; skin warm, dry, no cyanosis, no clubbing of nails; palpable radial and pedal pulses  : deferred  Psych: calm, cooperative overall yet at times a little hard to redirect; delusional      CANDICE ACEVEDO  1945 Age: 79, Gender: M  SS#: [id number] MRN:  Unit/Room/Bed: /B 604/A  Patient Phone:  Accession: [id number] Patient ID: [id number]  SO Event#: [id number] Chart#:  Client#: [id number] Client Pt ID:  PETEY SCH[address]Z [address] [place] [address]  Ordering Dr: MARIA MARIANO MD  Referring Dr:  Attending Dr:  RENAL PANEL INCL GFR BRIGIDO  GLUCOSE 121~ H mg/dL  GLUCOSE, FASTING 65-99 mg/dL  GLUCOSE, NON-FASTING  mg/dL  SODIUM 137 136-145 mEq/L  POTASSIUM 3.8 3.5-5.3 mEq/L  CHLORIDE 103  mEq/L  CARBON DIOXIDE (CO2) 18~ L 21-33 mEq/L  BUN (UREA NITROGEN) 30~ H 7-25 mg/dL  CREATININE 1.6~ H 0.6-1.2 mg/dL  BUN/CREATININE RATIO 19 6-22  GFR- 51~ L >60 mL/min/1.73 m2  GFR-NON-AFRICAN AMERICAN 42~ L >60 mL/min/1.73 m2   Stage of CKD eGFR (mL/min/1.73 square meters)   Stage 1 >/= 90 or > 90   Stage 2 60 - 89   Stage 3 30 - 59   Stage 4 15 - 29   Stage 5 </= 14 or < 15  ** GFR is reliable for adults 17 to 69 years with stable kidney   function.  CALCIUM 8.0~ L 8.4-10.2 mg/dL  ALBUMIN 3.0~ L 3.5-5.5 g/dL  PHOSPHORUS 3.9 2.3-4.7 mg/dL  Diagnoses and Plan:   RLL and RUE weakness and pain due to Closed fracture of proximal end of right humerus with routine healing   right bowen-orthotic femoral head fracture - - slow healing fractures; continue only flat foot weight bearing right leg; he continues with unstady gait and poor safety awareness and should rely on a wheelchair to propel himself in the home and out for medical appointments. Will order standard wheelchair.   Dementia with agitation - mixed up most of the time; poor sleep pattern; appetite wax and wanes; poor safety awareness; follows with neuropsychiatrist; continue thiamine; no other treatment  Insomnia - still not sleeping much; increase melatonin to 5mg and add 2 tylenol routinely at night; staff could not say how he slept last night but I dont suspect it has improved  Hx of UTI and Urinary retention - residual ok when kitchen removed 3 days ago; no signs of retention; continue flomax  Htn and ckd stage 3b- toprol increased due to elevated SBP; some improvement in SBP and  he is tolerating it, remains ckd 3b.  5. Hypokalemia - on KCL supplement for K 3.3; repeat K 4.5 last week  6. Paroxysmal afib - on toprol but no longer on AC; stable    The patient would benefit from a hospital bed due to dx nonweight bearing right arm and partial weight bearing right leg. Patient  requires a hospital bed for pain as well as ease of getting in and out of bed with him only having use of one arm. It would be difficult and could cause further harm to his arm in a standard bed.  Without a hospital bed this patient is at an increased risk of refractureiing the right arm or causing improper healing of his current humeral fracture.    Agree to discharge home with wife tomorrow and encourage home health nursing and PT and OT. No changes to current treatment plan and current medications.     Time spent on discharge was 35min.

## 2025-01-16 NOTE — LETTER
"Patient: Raúl Garcia  : 1945    Encounter Date: 2025    Reason for visit: assessment for discharge    Admitted from: home  Facility: [facility]  Dates of hospitalization: 2024-2024  Dates of [place] rehab: 2024 to 2025  Hospital Course:  Mr. Raúl Garcia 79 y.o. year old male was admitted for fall at a restaurant, sustaining a   R femur periprosthetic greater trochanter fx, R humeral head fx; metabolic acidosis, leukocytosis. He was stabilized and it was ordered by ortho to be nonweight bearing to his arm and TTWB right leg until a follow up visit. He had some hospital psychoses at [place] and also a kitchen was placed presumed due to immobility. His lab workup was ok other than mild elevation of creatinine and cervical xray showed degenerative changes.   While in rehab Raúl had a few non-injury falls. He has also remained confused a good part of the time. He was treated for another UTI here but that was after he had either pulled the kitchen out or disconnected the tube a few times. After treating the uti Raúl also developed mild urinary retention but after reinserting the kitchen and giving it a little more time, the kitchen was removed and he did not have significant urinary residual.   A return visit to the orthopedic surgeon last week allowed him to use his right arm and to advance to flatfoot weightbearing through the right lower extremity as long as pain is tolerated. Patient is to follow abductor and posterior precautions.   He is now ready to discharge home and will continue PT and OT.  Stage 3 ckd has been a concern while here but it has been stable; he is for another bmp today.     Recent HPI: Raúl was seen today in his room at [facility] to assess readiness for discharge and any last minute needs.     Chief Complaint: “I am feeling fine. I am looking for the exit\"    Review of Systems   General: no fever or chills, does not feel ill; doesn't recall how he " "is sleeping; not tired during the day  Skin: left shin abrasion; few bruises  EENT:  no blurred vision; no soreness of throat; no trouble chewing or swallowing  Respiratory: no cough, congestion or shortness of breath  Cardiac: no chest pain or palpitations  Gastrointestinal: no abdominal pain, nausea, vomiting, heartburn, diarrhea or constipation; BM every day without straining  Genitourinary: voiding fine he thinks; no discomfort  Musculoskeletal: right shoulder and both hips sore; no limitation of ROM except for right arm limited due to pain  Neurologic: feels his memory is good, no headaches, dizziness or lightheadedness; no tremors or involuntary movements; no altered sensation; right arm weaker  Psychiatric: says he is a little stressed about work and work orders right now and is about to just give up    Physical Examination   Vitals: Blood pressure 155/70, pulse 68, temperature 36.2 °C (97.1 °F), resp. rate 20, SpO2 98%.  142# which has remained stable while here  General: sitting in wheelchair in the hallway by his room, a bit delusional talking about being at work and was not able to eat breakfast due to   \"Thinking about how much needs done\";  talkative and confused, able to answer some questions appropriately; has been cooperative with shower and care  Neurologic: alert, oriented x1, mixed up about his situation, does not know where he is, thinking he is at work; long term memory intact; speech clear and fluent; facial features symmetrical and tongue midline; equal peripheral strength, tone and sensation  Skin: no abnormalities of hair or nails; right shoulder and hand with fading bruising; left shin laterally with scabbed abrasions   HEENT: no trauma, bruises, swelling; adequate hearing of close conversation; adequate vision for ADLs; no eye/ear/nasal drainage or irritation; oral mucosa moist and pink w/o lesions or discolorations; no coughing while swallowing  Respiratory: unlabored; chest symmetrical " w/ good rise and fall; no coughing, breath sounds equal and clear throughout  Heart: irregular rhythm, rate controlled; normal heart sounds w/ no murmurs, rubs and gallops  Abdomen: nondistended, nontenderness, bowel sounds active x4, no masses/hernias; last BM yesterday  Musculoskeletal: right arm without edema, has some limited ROM at shoulder; good use of both legs; adequate ROM of left arm and leg and neck, no kyphosis or scoliosis; able to sit upright unassisted and able to feed himself and perform ADLs from wheelchair; often slouches in wheelchair  PV: 1+ edema top of socks only; skin warm, dry, no cyanosis, no clubbing of nails; palpable radial and pedal pulses  : deferred  Psych: calm, cooperative overall yet at times a little hard to redirect; delusional      CANDICE ACEVEDO  1945 Age: 79, Gender: M  SS#: [id number] MRN:  Unit/Room/Bed: /B 604/A  Patient Phone:  Accession: [id number] Patient ID: [id number]  SO Event#: [id number] Chart#:  Client#: [id number] Client Pt ID:  PETEY SCHULTZ [address] [place] [address]  Ordering Dr: MARIA MARIANO MD  Referring Dr:  Attending Dr:  RENAL PANEL INCL GFR BRIGIDO  GLUCOSE 121~ H mg/dL  GLUCOSE, FASTING 65-99 mg/dL  GLUCOSE, NON-FASTING  mg/dL  SODIUM 137 136-145 mEq/L  POTASSIUM 3.8 3.5-5.3 mEq/L  CHLORIDE 103  mEq/L  CARBON DIOXIDE (CO2) 18~ L 21-33 mEq/L  BUN (UREA NITROGEN) 30~ H 7-25 mg/dL  CREATININE 1.6~ H 0.6-1.2 mg/dL  BUN/CREATININE RATIO 19 6-22  GFR- 51~ L >60 mL/min/1.73 m2  GFR-NON-AFRICAN AMERICAN 42~ L >60 mL/min/1.73 m2   Stage of CKD eGFR (mL/min/1.73 square meters)   Stage 1 >/= 90 or > 90   Stage 2 60 - 89   Stage 3 30 - 59   Stage 4 15 - 29   Stage 5 </= 14 or < 15  ** GFR is reliable for adults 17 to 69 years with stable kidney   function.  CALCIUM 8.0~ L 8.4-10.2 mg/dL  ALBUMIN 3.0~ L 3.5-5.5 g/dL  PHOSPHORUS 3.9 2.3-4.7 mg/dL  Diagnoses and Plan:   RLL and RUE weakness and pain due to  Closed fracture of proximal end of right humerus with routine healing  right bowen-orthotic femoral head fracture - - slow healing fractures; continue only flat foot weight bearing right leg; he continues with unstady gait and poor safety awareness and should rely on a wheelchair to propel himself in the home and out for medical appointments. Will order standard wheelchair.   Dementia with agitation - mixed up most of the time; poor sleep pattern; appetite wax and wanes; poor safety awareness; follows with neuropsychiatrist; continue thiamine; no other treatment  Insomnia - still not sleeping much; increase melatonin to 5mg and add 2 tylenol routinely at night; staff could not say how he slept last night but I dont suspect it has improved  Hx of UTI and Urinary retention - residual ok when kitchen removed 3 days ago; no signs of retention; continue flomax  Htn and ckd stage 3b- toprol increased due to elevated SBP; some improvement in SBP and  he is tolerating it, remains ckd 3b.  5. Hypokalemia - on KCL supplement for K 3.3; repeat K 4.5 last week  6. Paroxysmal afib - on toprol but no longer on AC; stable    Agree to discharge home with wife tomorrow and encourage home health nursing and PT and OT. No changes to current treatment plan and current medications.     Time spent on discharge was 35min.       Electronically Signed By: ROHINI Robbins   1/16/25  9:47 PM

## 2025-02-05 ENCOUNTER — APPOINTMENT (OUTPATIENT)
Dept: CARDIOLOGY | Facility: CLINIC | Age: 80
End: 2025-02-05
Payer: MEDICARE

## 2025-02-05 VITALS
BODY MASS INDEX: 20.73 KG/M2 | HEART RATE: 98 BPM | SYSTOLIC BLOOD PRESSURE: 142 MMHG | OXYGEN SATURATION: 99 % | HEIGHT: 66 IN | WEIGHT: 129 LBS | DIASTOLIC BLOOD PRESSURE: 80 MMHG

## 2025-02-05 DIAGNOSIS — I48.0 PAROXYSMAL ATRIAL FIBRILLATION (MULTI): Primary | ICD-10-CM

## 2025-02-05 PROCEDURE — 99215 OFFICE O/P EST HI 40 MIN: CPT

## 2025-02-05 PROCEDURE — 1159F MED LIST DOCD IN RCRD: CPT

## 2025-02-05 PROCEDURE — 1123F ACP DISCUSS/DSCN MKR DOCD: CPT

## 2025-02-05 PROCEDURE — 3077F SYST BP >= 140 MM HG: CPT

## 2025-02-05 PROCEDURE — 1036F TOBACCO NON-USER: CPT

## 2025-02-05 PROCEDURE — 3079F DIAST BP 80-89 MM HG: CPT

## 2025-02-05 PROCEDURE — G2211 COMPLEX E/M VISIT ADD ON: HCPCS

## 2025-02-05 RX ORDER — METOPROLOL SUCCINATE 25 MG/1
25 TABLET, EXTENDED RELEASE ORAL DAILY
Qty: 90 TABLET | Refills: 3 | Status: SHIPPED | OUTPATIENT
Start: 2025-02-05 | End: 2026-02-05

## 2025-02-05 NOTE — PROGRESS NOTES
Location of visit: [address]   Type of Visit: Established - Last Seen: 6/13/2024     History Of Present Illness:    Raúl Garcia is a 80 y.o. 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    Patient presented to the ED on 06-11-24 for a witnessed syncope episode lasting 30 to 60 seconds, with no seizure activity, preceding symptoms, chest pain, headache, or abdominal pain. He reports a recent left femur fracture without surgery. EMS noted concern for new-onset atrial fibrillation, which patient denies having a history of. On arrival, patient exhibited bradycardia and EKG showed slow atrial fibrillation. Troponin was mildly elevated but stable. CT imaging ruled out acute pulmonary embolism. Current medications include Verapamil SR 180mg, Losartan 100mg, and Atorvastatin 20mg. Patient declined admission for further syncope workup and telemetry, preferring to follow up with his primary care physician of 40 years. He was advised on the potential need for anticoagulation and medication changes for atrial fibrillation, and to return to the ED if symptoms worsen. Patient left the ED in stable condition with BP stable throughout evaluation. Follow-up with cardiology recommended.  At the moment of evaluation in the cardiology office the patient was asymptomatic. During the episode of syncope, he was sitting down and talking to his brother. The onset was acute, with loss of consciousness and loss of postural tone. He had a rapid and complete recovery with no memory of the event. There were no associated symptoms, including no palpitations, chest pain, or dyspnea before or after the event. He denied chest pain, dyspnea on exertion, shortness of breath, orthopnea, PND, nocturia, edema, palpitations, dizziness,  lightheadedness, or claudication. ECG shows sinus rhythm at 80 bpm, normal AV conduction, and normal ventricular repolarization.    Echocardiogram> CONCLUSIONS:  1. Left ventricular ejection fraction is normal, calculated by Taylor's biplane at 71%.  2. There is normal right ventricular global systolic function.  3. Mild aortic valve stenosis.  Holter> The predominant rhythm was Sinus with Frequent Supraventricular Ectopy. The Maximum Heart Rate recorded was 122 bpm, 07/10 14:32:46, the Minimum Heart Rate recorded was 32 bpm, 07/17 02:01:52, and the Average Heart Rate was 69 bpm. There were 2,198 VE beats with a burden of <1%. There were 4 occurrences of Ventricular Tachycardia with the Fastest episode 111 bpm, 07/14 12:52:44, and the Longest episode 6 beats, 07/11 20:18:40. There were 169,323 SVE beats with a burden of 12%. There were 13 occurrences of Supraventricular  tachycardia with the Fastest episode 122 bpm, 07/10 14:32:46, and the Longest episode 8 beats, 07/17 10:14:48. Other rhythms in this study include: Ventricular Run, 2:1 AV Block, Second Degree AV Block Type I. There were 0 Patient Triggers.    ===================================    He was recently (Jan 2025) admitted for fall at a restaurant, sustaining a R femur periprosthetic greater trochanter fx, R humeral head fx; metabolic acidosis, leukocytosis. He was stabilized and it was ordered by ortho to be nonweight bearing to his arm and TTWB right leg until a follow up visit. He had some hospital psychoses at [place] and also a kitchen was placed presumed due to immobility. His lab workup was ok other than mild elevation of creatinine and cervical xray showed degenerative changes.   He didn't have surgery. He is recovering fine with PT  He comes to the visit with his partner.  He told me that he feels fine, he denies chest pain, chest pressure, palpitations, shortness of breath, edema, lightheadedness, syncope.  He has been doing his PT without  problems.  After the cardiac testing was done he was seen by his PCP and the patient stopped using anticoagulation.  The Holter monitoring did not show any episodes of atrial fibrillation.  The echocardiogram shows a normal ejection fraction and and only mild aortic valve stenosis.    When I asked the cause of his recent fall, he states that he did not faint, he was in a restaurant and he had trouble with people that was walking by and he tripped.      Past Medical History:  He has a past medical history of Impacted cerumen, bilateral (12/07/2016), Other conditions influencing health status (01/20/2014), Personal history of colonic polyps (05/13/2020), Personal history of other diseases of the musculoskeletal system and connective tissue, and Personal history of other diseases of the nervous system and sense organs.    Past Surgical History:  He has a past surgical history that includes Other surgical history (01/20/2014); Total hip arthroplasty (04/12/2017); Other surgical history (05/08/2019); and Cataract extraction (02/19/2014).    Social History:  He reports that he has never smoked. He has never been exposed to tobacco smoke. He has never used smokeless tobacco. He reports current alcohol use of about 14.0 standard drinks of alcohol per week. He reports that he does not use drugs.    Family History:  No family history on file.  Allergies:  Amlodipine, Ibuprofen, and Penicillins    Outpatient Medications:  Current Outpatient Medications   Medication Instructions    acetaminophen (TYLENOL) 1,000 mg, 3 times daily    atorvastatin (LIPITOR) 20 mg, oral, Daily    ergocalciferol (VITAMIN D-2) 1.25 mg, Weekly    famciclovir (FAMVIR) 125 mg, Daily    hypromellose ([place]) 2.5 % ophthalmic solution 1 drop, 4 times daily PRN    lidocaine (Lidocare) 4 % patch 1 patch, Daily    melatonin 4.5 mg, oral, Nightly    metoprolol succinate XL (TOPROL-XL) 50 mg, Daily    multivitamin tablet 1 tablet, Daily    potassium  chloride CR 20 mEq ER tablet 20 mEq, Daily    sodium bicarbonate 650 mg, oral, Daily     Last Recorded Vitals:  There were no vitals filed for this visit.    Physical Exam:      1/16/2025     9:10 AM 1/13/2025    10:01 AM 1/6/2025     9:42 AM 1/2/2025    11:40 AM 12/30/2024     8:30 AM 12/27/2024     1:30 PM   Vitals   Systolic 155 109 159 179 122 149   Diastolic 70 70 82 89 76 83   Heart Rate 68 66 80 85 72 98   Temp 36.2 °C (97.1 °F) 36.5 °C (97.7 °F) 36.6 °C (97.9 °F) 36.3 °C (97.3 °F) 36.4 °C (97.6 °F) 36.2 °C (97.2 °F)   Resp 20 16 18 20 14 20   Weight (lb)  142 142  142 142.2   BMI  22.92 kg/m2 22.92 kg/m2  22.92 kg/m2 22.95 kg/m2   BSA (m2)  1.73 m2 1.73 m2  1.73 m2 1.73 m2     Wt Readings from Last 5 Encounters:   01/13/25 64.4 kg (142 lb)   01/06/25 64.4 kg (142 lb)   12/30/24 64.4 kg (142 lb)   12/27/24 64.5 kg (142 lb 3.2 oz)   12/24/24 64.5 kg (142 lb 3.2 oz)       Physical Exam  Vitals reviewed.   HENT:      Head: Normocephalic.      Mouth/Throat:      Pharynx: Oropharynx is clear.   Eyes:      Pupils: Pupils are equal, round, and reactive to light.   Cardiovascular:      Rate and Rhythm: Normal rate.      Pulses: Normal pulses.      Heart sounds: Murmur heard.      Systolic murmur is present with a grade of 3/6.   Pulmonary:      Effort: Pulmonary effort is normal.      Breath sounds: Normal breath sounds.   Abdominal:      General: Abdomen is flat. Bowel sounds are normal.      Palpations: Abdomen is soft.   Musculoskeletal:         General: Normal range of motion.   Skin:     General: Skin is warm and dry.   Neurological:      General: No focal deficit present.      Mental Status: He is alert.   Psychiatric:         Mood and Affect: Mood normal.              Last Labs Reviewed:  CBC -  Recent Labs     06/26/24  1507 05/20/24  1612 05/08/24  0608 05/07/24  1855 05/17/23  1627   WBC 6.3 9.6 9.1 12.2* 7.0   HGB 11.5* 10.7* 10.3* 11.5* 13.3*   HCT 32.8* 31.8* 29.1* 32.4* 39.3*    682* 239 243 291    MCV 88 94 90 92 93     CMP -  Recent Labs     08/28/24  1445 07/30/24  1339 07/02/24  1513 06/26/24  1507 05/20/24  1612 05/08/24  0608 05/07/24  1855   * 135* 128* 125* 130* 129* 128*   K 4.6 4.6 4.2 3.9 4.6 2.9* 3.2*   CL 99 101 94* 89* 96* 96* 93*   CO2 25 25 27 26 25 20* 25   ANIONGAP 15 14 11 14 14 16 13   BUN 18 21 24* 36* 21 23 26*   CREATININE 1.34* 1.27 1.32* 1.77* 1.39* 1.34* 1.77*   EGFR 54* 57* 55* 39* 52* 54* 39*   MG  --   --   --   --   --  1.50* 1.60   CALCIUM 9.1 9.3 8.7 8.9 9.5 8.5* 8.8     Recent Labs     05/20/24  1612 05/17/23  1627 05/16/22  1515 05/12/21  1518 05/13/20  1506   ALBUMIN 3.9 4.2 4.5 4.5 4.6   ALKPHOS 118 69 54 67 69   ALT 13 12 20 38 21   AST 16 21 22 38 21   BILITOT 0.9 0.7 0.9 0.5 0.5     LIPID PANEL -   Recent Labs     05/20/24  1612 05/17/23  1627 05/16/22  1515 05/12/21  1518   CHOL 197 180 192 214*   LDLF  --  86 113* 124*   LDLCALC 134*  --   --   --    HDL 41.5 81.8 64.2 66.0   TRIG 109 62 75 118     COAGULATION PANEL -  Recent Labs     05/07/24  1855 02/28/19  1548   INR 1.0 1.0     HEME/ENDO -  Recent Labs     12/05/24  0113 05/16/22  1515 05/12/21  1518 05/08/19  0000   HGBA1C 5.5 5.7* 6.2 5.6     CARDIOVASCULAR  Recent Labs     05/07/24 2128 05/07/24  1855   TROPHS 80* 97*   BNP  --  222*     Last Cardiology/Imaging Tests Personally Reviewed (if images available) and Interpreted:  ECG:  No results found for this or any previous visit (from the past 4464 hours).  ECG 12 lead 05/07/2024    Echocardiogram:  Echocardiogram     [facility], [address] [place] [address]  Tel [phone] and Fax [phone]    TRANSTHORACIC ECHOCARDIOGRAM REPORT      Patient Name:     CANDICE ACEVEDO Reading Physician:   [id number] Tavo Rasheed MD  Study Date:       6/21/2023      Referring Physician: ANNIE SOLANO  MRN/PID:          [id number]       PCP:  Accession/Order#: [id number]      Department Location: [facility]  Lab  YOB: 1945       Fellow:  Gender:           M              Nurse:  Admit Date:                      Sonographer:         Benita Aguiarjamarcus VILA  Height:           170.18 cm      CC Report to:  Weight:           68.04 kg       Study Type:          Echocardiogram  BSA:              1.79 m2  Blood Pressure: 170 /80 mmHg    Diagnosis/ICD: I70.0-Atherosclerosis of aorta  Indication:    Aortic vavle sclerosis  Procedure/CPT: Echo Complete w Full Doppler-[id number]    Patient History:  Pertinent History: Arotic vavle sclorsis.    Study Detail: The following Echo studies were performed: 2D, M-Mode, Doppler and  color flow. Technically challenging study due to prominent lung  artifact.      PHYSICIAN INTERPRETATION:  Left Ventricle: The left ventricular systolic function is hyperdynamic, with an estimated ejection fraction of 70-75%. There are no regional wall motion abnormalities. The left ventricular cavity size is decreased. Spectral Doppler shows a normal pattern of left ventricular diastolic filling.  Left Atrium: The left atrium is normal in size.  Right Ventricle: The right ventricle is normal in size. There is normal right ventricular global systolic function.  Right Atrium: The right atrium is normal in size.  Aortic Valve: The aortic valve is trileaflet. There is mild aortic valve cusp calcification. There are increased aortic valve velocities due to increased flow/dynamic ejection. There is no evidence of aortic valve regurgitation. The peak instantaneous gradient of the aortic valve is 16.8 mmHg. The mean gradient of the aortic valve is 8.8 mmHg.  Mitral Valve: The mitral valve is normal in structure. There is no evidence of mitral valve regurgitation.  Tricuspid Valve: The tricuspid valve is structurally normal. There is trace tricuspid regurgitation. The right ventricular systolic pressure is unable to be estimated.  Pulmonic Valve: The pulmonic valve is not well visualized. There is physiologic  pulmonic valve regurgitation.  Pericardium: There is a trivial pericardial effusion.  Aorta: The aortic root is normal. The Ao Sinus is 3.10 cm. The Asc Ao is 2.60 cm.  Systemic Veins: The inferior vena cava appears to be of normal size. There is IVC inspiratory collapse greater than 50%.  In comparison to the previous echocardiogram(s): There are no prior studies on this patient for comparison purposes.      CONCLUSIONS:  1. Left ventricular systolic function is hyperdynamic with a 70-75% estimated ejection fraction.  2. Poorly visualized anatomical structures due to suboptimal image quality.  3. Left ventricular cavity size is decreased.    QUANTITATIVE DATA SUMMARY:  2D MEASUREMENTS:  Normal Ranges:  LAs:           2.41 cm   (2.7-4.0cm)  RVIDd:         1.73 cm   (0.9-3.6cm)  IVSd:          1.53 cm   (0.6-1.1cm)  LVPWd:         0.95 cm   (0.6-1.1cm)  LVIDd:         3.56 cm   (3.9-5.9cm)  LVIDs:         1.56 cm  LV Mass Index: 82.0 g/m2  LV % FS        56.2 %    LA VOLUME:  Normal Ranges:  LA Vol A4C:        46.7 ml    (22+/-6mL/m2)  LA Vol A2C:        47.2 ml  LA Vol BP:         49.1 ml  LA Vol Index A4C:  26.1ml/m2  LA Vol Index A2C:  26.4 ml/m2  LA Vol Index BP:   27.4 ml/m2  LA Area A4C:       18.3 cm2  LA Area A2C:       17.6 cm2  LA Major Axis A4C: 6.1 cm  LA Major Axis A2C: 5.6 cm  LA Volume Index:   27.4 ml/m2  LA Vol A4C:        43.5 ml  LA Vol A2C:        40.1 ml    RA VOLUME BY A/L METHOD:  Normal Ranges:  RA Vol A4C:        27.4 ml    (8.3-19.5ml)  RA Vol Index A4C:  15.3 ml/m2  RA Area A4C:       11.9 cm2  RA Major Axis A4C: 4.4 cm    AORTA MEASUREMENTS:  Normal Ranges:  Ao Sinus, d: 3.10 cm (2.1-3.5cm)  Asc Ao, d:   2.60 cm (2.1-3.4cm)    LV SYSTOLIC FUNCTION BY 2D PLANIMETRY (MOD):  Normal Ranges:  EF-A4C View: 56.1 % (>=55%)  EF-A2C View: 50.4 %  EF-Biplane:  53.8 %    LV DIASTOLIC FUNCTION:  Normal Ranges:  MV Peak E:        0.82 m/s    (0.7-1.2 m/s)  MV Peak A:        0.67 m/s    (0.42-0.7  m/s)  E/A Ratio:        1.22        (1.0-2.2)  MV e'             0.08 m/s    (>8.0)  MV A Dur:         115.34 msec  E/e' Ratio:       10.25       (<8.0)  PulmV Sys Alan:    55.75 cm/s  PulmV Ruiz Alan:   34.17 cm/s  PulmV S/D Alan:    1.63  PulmV A Revs Alan: 31.60 cm/s  PulmV A Revs Dur: 80.74 msec    MITRAL VALVE:  Normal Ranges:  MV DT: 183 msec (150-240msec)    AORTIC VALVE:  Normal Ranges:  AoV Vmax:                2.05 m/s  (<=1.7m/s)  AoV Peak P.8 mmHg (<20mmHg)  AoV Mean P.8 mmHg  (1.7-11.5mmHg)  LVOT Max Alan:            1.27 m/s  (<=1.1m/s)  AoV VTI:                 35.34 cm  (18-25cm)  LVOT VTI:                23.04 cm  LVOT Diameter:           1.75 cm   (1.8-2.4cm)  AoV Area, VTI:           1.58 cm2  (2.5-5.5cm2)  AoV Area,Vmax:           1.49 cm2  (2.5-4.5cm2)  AoV Dimensionless Index: 0.65      RIGHT VENTRICLE:  RV 1   2.9 cm  RV 2   1.9 cm  RV 3   5.3 cm  TAPSE: 21.0 mm  RV s'  0.18 m/s    TRICUSPID VALVE/RVSP:  Normal Ranges:  Est. RA Pressure: 3 mmHg    PULMONIC VALVE:  Normal Ranges:  PV Accel Time: 44 msec  (>120ms)  PV Max Alan:    1.1 m/s  (0.6-0.9m/s)  PV Max P.0 mmHg    Pulmonary Veins:  PulmV A Revs Dur: 80.74 msec  PulmV A Revs Alan: 31.60 cm/s  PulmV Ruiz Alan:   34.17 cm/s  PulmV S/D Alan:    1.63  PulmV Sys Alan:    55.75 cm/s    AORTA:  Asc Ao Diam 2.63 cm      [id number] Tavo Rasheed MD  Electronically signed on 2023 at 8:12:08 AM      No results found for this or any previous visit from the past 1095 days.    Cath:  No results found for this or any previous visit from the past 1825 days.  No results found for this or any previous visit from the past 3650 days.  No results found for this or any previous visit from the past 1095 days.    Stress Test:  No results found for this or any previous visit from the past 1825 days.  No results found for this or any previous visit from the past 1095 days.    Cardiac CT/MRI:  No results found for this or any previous  visit from the past 1825 days.  No results found for this or any previous visit from the past 1095 days.    Other CT:      CV RISK FACTORS:   # Hypertension: Last BP:  .  # Hyperlipidemia: Last Tchol 197 / LDL No results found for requested labs within last 365 days. / HDL 41.5 / TRIG 109 (5/20/2024:  4:12 PM).  # Type II Diabetes Mellitus: Last A1c 5.5 (12/5/2024:  1:13 AM).  # Obesity: Last BMI:  .  # CKD: Last BUN/Cr (GFR): 18/1.34 (54), 8/28/2024:  2:45 PM.    ASCV RISK:  The ASCVD Risk score (Tone THOMAS, et al., 2019) failed to calculate for the following reasons:    The 2019 ASCVD risk score is only valid for ages 40 to 79    Assessment/Plan   The patient presented to the ED on 06-11-24 after experiencing a witnessed syncope episode lasting 30 to 60 seconds while at a restaurant. There were no preceding symptoms, seizure activity, chest pain, headache, or abdominal pain reported. EMS noted concern for new-onset atrial fibrillation, which the patient denies having a history of.  -Echocardiogram 2023: Left ventricular systolic function is hyperdynamic with a 70-75% estimated ejection fraction.  -EKG at the ER showed Afib at 54 bpm.   -Echocardiogram 2024> CONCLUSIONS:  1. Left ventricular ejection fraction is normal, calculated by Taylor's biplane at 71%.  2. There is normal right ventricular global systolic function.  3. Mild aortic valve stenosis.  -Holter monitoring> The predominant rhythm was Sinus with Frequent Supraventricular Ectopy. The Maximum Heart Rate recorded was 122 bpm, 07/10 14:32:46, the Minimum Heart Rate recorded was 32 bpm, 07/17 02:01:52, and the Average Heart Rate was 69 bpm. There were 2,198 VE beats with a burden of <1%. There were 4 occurrences of Ventricular Tachycardia with the Fastest episode 111 bpm, 07/14 12:52:44, and the Longest episode 6 beats, 07/11 20:18:40. There were 169,323 SVE beats with a burden of 12%. There were 13 occurrences of Supraventricular  tachycardia with the  Fastest episode 122 bpm, 07/10 14:32:46, and the Longest episode 8 beats, 07/17 10:14:48. Other rhythms in this study include: Ventricular Run, 2:1 AV Block, Second Degree AV Block Type I. There were 0 Patient Triggers.    #Paroxysmal Atrial Fibrillation - Chadsvasc 2  -Holter did not show new episodes of A-fib.  -No new episodes of symptomatic paroxysmal A-fib.  During his recent hospitalization A-fib was not described.  The patient stopped using anticoagulation  -We discussed the risk and the benefits of anticoagulation, especially with the risk of falls, and we agree on a chair based decision that is reasonable for him to be off anticoagulation because of the risk appears to be greater than the benefit.  We also discussed about the option of left atrial appendage closure, and he is open to evaluate the procedure in case he is getting symptoms like palpitations.    Plan and Recommendations:  -Medication Review: Restart metoprolol succinate 25mg once a day  -I educated the patient on recognizing symptoms of worsening arrhythmia or thromboembolic events and to seek immediate medical attention if symptoms recur.  - Patient will follow up with me in the Cardiology office in 6 month or before in case of symptoms  -Check BP at home  - I spent 45 minutes assessing the case between pre-charting, face-to-face patient interaction, and documentation    Rubin Howard MD

## 2025-02-05 NOTE — PATIENT INSTRUCTIONS
-Medication Review: Restart metoprolol succinate 25mg once a day  -I educated the patient on recognizing symptoms of worsening arrhythmia or thromboembolic events and to seek immediate medical attention if symptoms recur.  - Patient will follow up with me in the Cardiology office in 6 month or before in case of symptoms  -Check BP at home    Monitor your blood pressure at home and keep a log of the readings    Steps to check your blood pressure at home    Be still> Don't smoke, drink caffeinated beverages or exercise within 30 minutes before measuring your blood pressure. Empty your bladder and ensure at least five minutes of quiet rest before measurements.   Sit correctly. Sit with your back straight and supported (on a dining chair, rather than a sofa). Your feet should be flat on the floor and your legs should not be crossed. Your arm should be supported on a flat surface, such as a table, with the upper arm at heart level. Make sure the bottom of the cuff is placed directly above the bend of the elbow. Check your monitor's instructions for an illustration or have your health care professional show you how.  Measure at the same time every day. It’s important to take the readings at the same time each day, such as morning and evening. It is best to take the readings daily, ideally beginning two weeks after a change in treatment and during the week before your next appointment.  Take multiple readings and record the results. Each time you measure, take two readings one minute apart and record the results using a printable (PDF) tracker. If your monitor has built-in memory to store your readings, take it with you to your appointments. Some monitors may also allow you to upload your readings to a secure website after you register your profile.  Don't take the measurement over clothes    You can review the information  here  [email or web address]

## 2025-02-19 ENCOUNTER — OFFICE VISIT (OUTPATIENT)
Dept: ORTHOPEDIC SURGERY | Facility: HOSPITAL | Age: 80
End: 2025-02-19
Payer: MEDICARE

## 2025-02-19 ENCOUNTER — HOSPITAL ENCOUNTER (OUTPATIENT)
Dept: RADIOLOGY | Facility: HOSPITAL | Age: 80
Discharge: HOME | End: 2025-02-19
Payer: MEDICARE

## 2025-02-19 DIAGNOSIS — M97.01XA PERIPROSTHETIC FRACTURE AROUND INTERNAL PROSTHETIC RIGHT HIP JOINT, INITIAL ENCOUNTER: ICD-10-CM

## 2025-02-19 DIAGNOSIS — M97.01XA PERIPROSTHETIC FRACTURE AROUND INTERNAL PROSTHETIC RIGHT HIP JOINT, INITIAL ENCOUNTER: Primary | ICD-10-CM

## 2025-02-19 PROCEDURE — 1123F ACP DISCUSS/DSCN MKR DOCD: CPT | Performed by: STUDENT IN AN ORGANIZED HEALTH CARE EDUCATION/TRAINING PROGRAM

## 2025-02-19 PROCEDURE — 73502 X-RAY EXAM HIP UNI 2-3 VIEWS: CPT | Mod: RT

## 2025-02-19 PROCEDURE — G2211 COMPLEX E/M VISIT ADD ON: HCPCS | Performed by: STUDENT IN AN ORGANIZED HEALTH CARE EDUCATION/TRAINING PROGRAM

## 2025-02-19 PROCEDURE — 1159F MED LIST DOCD IN RCRD: CPT | Performed by: STUDENT IN AN ORGANIZED HEALTH CARE EDUCATION/TRAINING PROGRAM

## 2025-02-19 PROCEDURE — 99214 OFFICE O/P EST MOD 30 MIN: CPT | Performed by: STUDENT IN AN ORGANIZED HEALTH CARE EDUCATION/TRAINING PROGRAM

## 2025-02-19 ASSESSMENT — PAIN - FUNCTIONAL ASSESSMENT: PAIN_FUNCTIONAL_ASSESSMENT: NO/DENIES PAIN

## 2025-02-19 NOTE — PROGRESS NOTES
Jazmin Lechuga MD   Adult Reconstruction and Joint Replacement Surgery  Phone: [phone]     Fax: [phone]       Name: Raúl Garcia  Age: 80 y.o.   : 1945   Date of Visit: 2025    Follow-up Hip    CC: Follow-up RIGHT hip    History of Present Illness:  This patient presents with 11 weeks of RIGHT hip pain.  He unfortunately sustained a fall on 2024 and was found to have an RIGHT [facility] [employer] periprosthetic femur fracture.    They last saw me for this problem on 1/10/2025. At the last visit, the patient was having significant use with balance.  Based on his injury and functional state, he was encouraged to continue home physical therapy.  He was advanced to flatfoot weightbearing at the last visit.  He was to continue with abductor and posterior hip precautions.  The patient reports his pain has nearly resolved at this point.  He is taking Tylenol only.  He has continued home physical therapy.  He feels ready now to start outpatient physical therapy.    Patient has tried the following Activity modification, Tylenol (arthritis dosing) , Physical therapy, and Xray. Date of last steroid injection: Never for this problem. Patient does not have pain at night. The patient does not report falls related to this problem. Patient is able to walk indoors only. Patient is currently using walker as assistive device. Primarily complains of lateral hip pain. Patient has difficulty with getting up out of a chair, stairs, getting in and out of a car. The pain is significantly impacting their ability to perform activities of daily living. Patient reports no longer able to do activities such as walking longer distances, walking without a walker.  He    The patient reports no fever, chills or night sweats. No wound drainage. No interval trauma. No recent dental work.      Implants in place: Ricardo Valenzuela  Date of initial surgery: 2019    HISTORY  PROMs   No questionnaires on file.     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    Other conditions influencing health status 01/20/2014    Blockage of ear    Personal history of colonic polyps 05/13/2020    History of adenomatous polyp of colon    Personal history of other diseases of the musculoskeletal system and connective tissue     Personal history of arthritis    Personal history of other diseases of the nervous system and sense organs     History of cataract       Past Medical History:   Diagnosis Date    Impacted cerumen, bilateral 12/07/2016    Bilateral hearing loss due to cerumen impaction    Other conditions influencing health status 01/20/2014    Blockage of ear    Personal history of colonic polyps 05/13/2020    History of adenomatous polyp of colon    Personal history of other diseases of the musculoskeletal system and connective tissue     Personal history of arthritis    Personal history of other diseases of the nervous system and sense organs     History of cataract     Documented in chart and reviewed.     Past Surgical History:   Procedure Laterality Date    CATARACT EXTRACTION  02/19/2014    Cataract Surgery    OTHER SURGICAL HISTORY  01/20/2014    Leg Repair    OTHER SURGICAL HISTORY  05/08/2019    Hip replacement    TOTAL HIP ARTHROPLASTY  04/12/2017    Hip Replacement       Allergies: He is allergic to amlodipine, ibuprofen, and penicillins.     Medications:  Current Outpatient Medications   Medication Instructions    acetaminophen (TYLENOL) 1,000 mg, 3 times daily    atorvastatin (LIPITOR) 20 mg, oral, Daily    famciclovir (FAMVIR) 125 mg, Daily    hypromellose (Vista Gonio) 2.5 % ophthalmic solution 1 drop, 4 times daily PRN    lidocaine (Lidocare) 4 % patch 1 patch, Daily    metoprolol succinate XL (TOPROL-XL) 25 mg, oral, Daily, Do not crush or chew. nHold for SBP <110 or HR <60    multivitamin tablet 1 tablet, Daily    potassium chloride CR  20 mEq ER tablet 20 mEq, Daily    sodium bicarbonate 650 mg, oral, Daily       No family history on file.  Documented in chart and reviewed.     Social History     Tobacco Use    Smoking status: Never     Passive exposure: Never    Smokeless tobacco: Never   Substance Use Topics    Alcohol use: Yes     Alcohol/week: 14.0 standard drinks of alcohol     Types: 14 Standard drinks or equivalent per week     Comment: Occasionally        Review of Systems:  Review of systems completed with medical assistant intake. Please refer to this note.     Physical Exam:  BMI: 21.    General: The patient is well appearing and has an appropriate affect.     Neurological Examination: SILT in SPN/DPN/Sural/Saphenous/Tibial nerves. 5/5 EHL, FHL, Tibial anterior, Gastrocnemius. Coordination grossly intact.     Cardiovascular Exam: Capillary refill <2 seconds.     Lymphatic Examination: There is no obvious lymphatic swelling present around the involved joint.    Skin Exam: Skin around the pertinent joint is without evidence of infection or rash.    Gait: patient ambulates with coxalgic gait.    Left Hip Examination:    Examination of the hip reveals the skin to be intact.  Healed posterior incision.    There is mild tenderness over the greater trochanter, mainly at the gluteus medius tendon sheath insertion to the greater trochanter.  There is no crepitation.  There are no overlying skin changes.    There is no obvious swelling.    There is a negative Stinchfield test.    Range of motion is: full extension to 95 degrees of flexion.    The hip internally rotates to 15 degrees and externally rotates to 35 degrees.    Abduction is 35 degrees and adduction is 15 degrees.    There is no significant groin and buttock pain with hip motion.    Abductor strength 4/5.    Right Hip Examination:    Examination of the hip reveals the skin to be intact.  Healed posterior incision.    There is mild tenderness over the greater trochanter.    There is no  obvious swelling.    There is a negative Stinchfield test.    Range of motion is: full extension to 100 degrees of flexion.    The hip internally rotates to 15 degrees and externally rotates to 40 degrees.    Abduction is 40 degrees and adduction is 20 degrees.    There is no significant groin and buttock pain with hip motion.    Abductor strength 4/5.    Right Knee Examination:  Examination of the right knee reveals the skin to be intact. There is no obvious swelling.     There is no tenderness to palpation.     Range of motion is 5-115 degrees of flexion.     The knee is stable.     There is no grinding with range of motion.     There is no patellofemoral crepitus.     Left Knee Examination:  Examination of the left knee reveals the skin to be intact. There is no obvious swelling.     There is no tenderness to palpation.     Range of motion is 5-115 degrees of flexion.     The knee is stable.     There is no grinding with range of motion.     There is no patellofemoral crepitus.    Imaging:  X-rays were personally reviewed today and show implants in good position with no evidence of complication.  Stable appearing greater trochanteric periprosthetic femur fracture with interval callus deposition.    Impression and Plan:  This patient presents for follow-up evaluation of RIGHT hip.    DIAGNOSIS:   Periprosthetic fracture around internal prosthetic right hip joint, initial encounter     I have discussed the options in detail with the patient. We have discussed anti-inflammatory medication, activity modification, physical therapy, and revision hip surgery.  There is no indication for surgery at this time.    Encouraged the patient to transition to outpatient physical therapy.  As his fitness improves, he may transition from walker to cane with physical therapy guidance.  A physical therapy prescription was ordered for the patient.  Patient will continue their home exercise program.Strategies for pain management using  over-the-counter anti-inflammatory medications reviewed.  Encouraged them to maintain range of motion and strength around the hip and knee joints.  They will continue to implement these strategies in addressing their pain.      Recommend the patient continue optimizing nonsurgical treatment interventions as outlined above for management of their joint pain.  I would be happy to see them again to discuss surgery if indicated or they are more optimized or to review progress with non-surgical treatment.  The patient verbalizes understanding with the recommendations and treatment plan as outlined above and is in agreement.  Questions were addressed.    RTC: 6 weeks     X-rays at next visit: Postop JOSE series -RIGHT    _____________________  Jazmin Lechuga MD   Attending Orthopaedic Surgeon  [facility]    [facility]    This office note was transcribed with dictation software.  Please excuse any typographical errors, program misunderstandings leading to inadvertent insertions or deletions of inappropriate wording, pronoun errors and other unintentional transcription errors not noticed on proof-reading.

## 2025-02-27 ENCOUNTER — APPOINTMENT (OUTPATIENT)
Dept: PRIMARY CARE | Facility: CLINIC | Age: 80
End: 2025-02-27
Payer: MEDICARE

## 2025-02-27 VITALS
TEMPERATURE: 97.1 F | HEART RATE: 81 BPM | BODY MASS INDEX: 20.82 KG/M2 | DIASTOLIC BLOOD PRESSURE: 70 MMHG | WEIGHT: 129 LBS | SYSTOLIC BLOOD PRESSURE: 126 MMHG

## 2025-02-27 DIAGNOSIS — I35.8 AORTIC VALVE SCLEROSIS: ICD-10-CM

## 2025-02-27 DIAGNOSIS — R26.89 BALANCE DISORDER: ICD-10-CM

## 2025-02-27 DIAGNOSIS — Z96.649 PERIPROSTHETIC FRACTURE OF HIP, SUBSEQUENT ENCOUNTER: ICD-10-CM

## 2025-02-27 DIAGNOSIS — R55 SYNCOPE, UNSPECIFIED SYNCOPE TYPE: Primary | ICD-10-CM
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
## 2025-02-27 DIAGNOSIS — I10 BENIGN ESSENTIAL HYPERTENSION: ICD-10-CM

## 2025-02-27 DIAGNOSIS — N18.30 STAGE 3 CHRONIC KIDNEY DISEASE, UNSPECIFIED WHETHER STAGE 3A OR 3B CKD (MULTI): ICD-10-CM

## 2025-02-27 DIAGNOSIS — M97.8XXD PERIPROSTHETIC FRACTURE OF HIP, SUBSEQUENT ENCOUNTER: ICD-10-CM

## 2025-02-27 PROCEDURE — 1123F ACP DISCUSS/DSCN MKR DOCD: CPT | Performed by: STUDENT IN AN ORGANIZED HEALTH CARE EDUCATION/TRAINING PROGRAM

## 2025-02-27 PROCEDURE — 1126F AMNT PAIN NOTED NONE PRSNT: CPT | Performed by: STUDENT IN AN ORGANIZED HEALTH CARE EDUCATION/TRAINING PROGRAM

## 2025-02-27 PROCEDURE — 3074F SYST BP LT 130 MM HG: CPT | Performed by: STUDENT IN AN ORGANIZED HEALTH CARE EDUCATION/TRAINING PROGRAM

## 2025-02-27 PROCEDURE — 3078F DIAST BP <80 MM HG: CPT | Performed by: STUDENT IN AN ORGANIZED HEALTH CARE EDUCATION/TRAINING PROGRAM

## 2025-02-27 PROCEDURE — 99214 OFFICE O/P EST MOD 30 MIN: CPT | Performed by: STUDENT IN AN ORGANIZED HEALTH CARE EDUCATION/TRAINING PROGRAM

## 2025-02-27 PROCEDURE — G2211 COMPLEX E/M VISIT ADD ON: HCPCS | Performed by: STUDENT IN AN ORGANIZED HEALTH CARE EDUCATION/TRAINING PROGRAM

## 2025-02-27 PROCEDURE — 1036F TOBACCO NON-USER: CPT | Performed by: STUDENT IN AN ORGANIZED HEALTH CARE EDUCATION/TRAINING PROGRAM

## 2025-02-27 ASSESSMENT — PAIN SCALES - GENERAL: PAINLEVEL_OUTOF10: 0-NO PAIN

## 2025-02-27 NOTE — PATIENT INSTRUCTIONS
In an effort to ensure that our patients LiveWell, a Team Member has reviewed your chart and identified an opportunity to provide the best care possible. An attempt was made to discuss or schedule due or overdue Preventive or Chronic Condition care.Care Gaps identified: Wellness Visits.    The Outcome was Contact was not made, left message. We are attempting to schedule a yearly wellness visit. If you have any questions or need help with scheduling, contact your primary care provider..   Type of Appointment needed: Comprehensive Annual Visit       Please stop at any  lab ([address] if you choose to use my building) to complete your blood and/or urine work that I've ordered for you.    I will contact you with the results at my soonest convenience. I strongly urge you to use [employer] as this is the quickest and easiest way to access your results and receive my correspondences.     I have provided you a handicap placard today.    Continue therapy    Follow up with your specialists as previously scheduled.

## 2025-02-27 NOTE — PROGRESS NOTES
Subjective   Patient ID: Raúl Garcia is a 80 y.o. male who presents for Follow-up.    HPI   Patient transitioning care from Dr. Neri Irizarry who has since retired. Please refer to his excellent notes for full details.     Re: MSK / fall- sustained a femur fracture and right shoulder fracture in 12/2024. This was treated non-operatively. Went to an rehab facility for 6 weeks, and then subsequently discharged to home.     Re: CV - BP at goal. Had one syncopal event. See cardiology notes. Not in Afib on his most recent ambulatory monitor study. Reports no sx high or low from HTN; denies blurry vision, HA, dizziness LoC CP SoB Guillen and leg swelling      Re: memory - less sharp than he used to be, per wife.     PMHx, FHx, Social Hx, Surg Hx personally reviewed at this appointment. No pertinent findings and/or changes from prior (if applicable).    ROS: Denies wt gain/loss f/c HA LoC CP SOB NVDC. See HPI above, and scanned sheet (if applicable). All other systems are reviewed and are without complaint.       Review of Systems    Objective   /70 (BP Location: Left arm, Patient Position: Sitting, BP Cuff Size: Adult)   Pulse 81   Temp 36.2 °C (97.1 °F) (Temporal)   Wt 58.5 kg (129 lb)   BMI 20.82 kg/m²     Physical Exam  Gen: elderly appearance. AAO x   HEENT: NC/AT. Anicteric sclera, symmetric pupils.   Neck: Soft, supple. No LAD. No goiter.   CV: RRR nl s1s2 no m/r/g  Pulm: CTAB no w/r/r, good air exchange  GI: soft NTND BS+ no hsm  Ext: WWP no edema  Neuro: II-XII grossly intact, nonfocal systemic findings  MSK: 5/5 strength b/l UE and LE  Gait: slow with walker      Lab Results   Component Value Date    WBC 6.3 06/26/2024    HGB 11.5 (L) 06/26/2024    HCT 32.8 (L) 06/26/2024     06/26/2024    CHOL 197 05/20/2024    TRIG 109 05/20/2024    HDL 41.5 05/20/2024    ALT 13 05/20/2024    AST 16 05/20/2024     (L) 08/28/2024    K 4.6 08/28/2024    CL 99 08/28/2024    CREATININE 1.34 (H) 08/28/2024     BUN 18 08/28/2024    CO2 25 08/28/2024    INR 1.0 05/07/2024    HGBA1C 5.5 12/05/2024     par    XR hip right with pelvis when performed 2 or 3 views  Narrative: Interpreted By:  Austin Reina,   STUDY:  XR HIP RIGHT WITH PELVIS WHEN PERFORMED 2 OR 3 VIEWS; ;  2/19/2025  1:37 pm      INDICATION:  Signs/Symptoms:right hip periprosthetic fracture.      ,M97.01XA Periprosthetic fracture around internal prosthetic right  hip joint, initial encounter      COMPARISON:  None.      ACCESSION NUMBER(S):  [id number]      ORDERING CLINICIAN:  SHAZHAD OMER      FINDINGS:  Right hip, two views      Total hip arthroplasty in place. There is a periprosthetic fracture  of the right greater trochanter similar to the prior. No change in  alignment. No perihardware lucency is seen.      Impression: Redemonstration of a periprosthetic fracture through the right  greater trochanter, similar to the prior.          MACRO:  None      Signed by: Austin Reina 2/20/2025 6:54 PM  Dictation workstation:   [id number]      Current Outpatient Medications   Medication Instructions    acetaminophen (TYLENOL) 1,000 mg, 3 times daily    atorvastatin (LIPITOR) 20 mg, oral, Daily    famciclovir (FAMVIR) 125 mg, Daily    hypromellose (Vista Gonio) 2.5 % ophthalmic solution 1 drop, 4 times daily PRN    lidocaine (Lidocare) 4 % patch 1 patch, Daily    metoprolol succinate XL (TOPROL-XL) 25 mg, oral, Daily, Do not crush or chew. nHold for SBP <110 or HR <60    multivitamin tablet 1 tablet, Daily    potassium chloride CR 20 mEq ER tablet 20 mEq, Daily    sodium bicarbonate 650 mg, oral, Daily          Assessment/Plan   Complex medical history with recent falls. Doing well since coming home. Mentation still not back to baseline but markedly better than when he was in the hospital. Continue outpatient therapy to build back strength and balance. Will check CMP for electrolyte abnormality.

## 2025-02-28 LAB
ALBUMIN SERPL-MCNC: 3.8 G/DL (ref 3.6–5.1)
ALP SERPL-CCNC: 93 U/L (ref 35–144)
ALT SERPL-CCNC: 11 U/L (ref 9–46)
ANION GAP SERPL CALCULATED.4IONS-SCNC: 8 MMOL/L (CALC) (ref 7–17)
AST SERPL-CCNC: 12 U/L (ref 10–35)
BASOPHILS # BLD AUTO: 78 CELLS/UL (ref 0–200)
BASOPHILS NFR BLD AUTO: 1.1 %
BILIRUB SERPL-MCNC: 0.3 MG/DL (ref 0.2–1.2)
BUN SERPL-MCNC: 31 MG/DL (ref 7–25)
CALCIUM SERPL-MCNC: 9.3 MG/DL (ref 8.6–10.3)
CHLORIDE SERPL-SCNC: 104 MMOL/L (ref 98–110)
CO2 SERPL-SCNC: 26 MMOL/L (ref 20–32)
CREAT SERPL-MCNC: 1.65 MG/DL (ref 0.7–1.22)
EGFRCR SERPLBLD CKD-EPI 2021: 42 ML/MIN/1.73M2
EOSINOPHIL # BLD AUTO: 142 CELLS/UL (ref 15–500)
EOSINOPHIL NFR BLD AUTO: 2 %
ERYTHROCYTE [DISTWIDTH] IN BLOOD BY AUTOMATED COUNT: 14 % (ref 11–15)
GLUCOSE SERPL-MCNC: 111 MG/DL (ref 65–139)
HCT VFR BLD AUTO: 36.1 % (ref 38.5–50)
HGB BLD-MCNC: 11.6 G/DL (ref 13.2–17.1)
LYMPHOCYTES # BLD AUTO: 1953 CELLS/UL (ref 850–3900)
LYMPHOCYTES NFR BLD AUTO: 27.5 %
MAGNESIUM SERPL-MCNC: 2.3 MG/DL (ref 1.5–2.5)
MCH RBC QN AUTO: 29.1 PG (ref 27–33)
MCHC RBC AUTO-ENTMCNC: 32.1 G/DL (ref 32–36)
MCV RBC AUTO: 90.5 FL (ref 80–100)
MONOCYTES # BLD AUTO: 682 CELLS/UL (ref 200–950)
MONOCYTES NFR BLD AUTO: 9.6 %
NEUTROPHILS # BLD AUTO: 4246 CELLS/UL (ref 1500–7800)
NEUTROPHILS NFR BLD AUTO: 59.8 %
PLATELET # BLD AUTO: 392 THOUSAND/UL (ref 140–400)
PMV BLD REES-ECKER: 10 FL (ref 7.5–12.5)
POTASSIUM SERPL-SCNC: 5.4 MMOL/L (ref 3.5–5.3)
PROT SERPL-MCNC: 6.6 G/DL (ref 6.1–8.1)
RBC # BLD AUTO: 3.99 MILLION/UL (ref 4.2–5.8)
SODIUM SERPL-SCNC: 138 MMOL/L (ref 135–146)
WBC # BLD AUTO: 7.1 THOUSAND/UL (ref 3.8–10.8)

## 2025-03-24 ENCOUNTER — EVALUATION (OUTPATIENT)
Dept: PHYSICAL THERAPY | Facility: CLINIC | Age: 80
End: 2025-03-24
Payer: MEDICARE

## 2025-03-24 DIAGNOSIS — M25.511 ACUTE PAIN OF RIGHT SHOULDER: ICD-10-CM

## 2025-03-24 DIAGNOSIS — R26.2 DIFFICULTY WALKING: Primary | ICD-10-CM

## 2025-03-24 DIAGNOSIS — M97.01XA PERIPROSTHETIC FRACTURE AROUND INTERNAL PROSTHETIC RIGHT HIP JOINT, INITIAL ENCOUNTER: ICD-10-CM

## 2025-03-24 DIAGNOSIS — M25.551 RIGHT HIP PAIN: ICD-10-CM

## 2025-03-24 DIAGNOSIS — R26.89 BALANCE DISORDER: ICD-10-CM

## 2025-03-24 DIAGNOSIS — M25.611 STIFFNESS OF RIGHT SHOULDER JOINT: ICD-10-CM

## 2025-03-24 PROCEDURE — 97161 PT EVAL LOW COMPLEX 20 MIN: CPT | Mod: GP

## 2025-03-24 PROCEDURE — 97110 THERAPEUTIC EXERCISES: CPT | Mod: GP

## 2025-03-24 NOTE — PROGRESS NOTES
Physical Therapy  Physical Therapy Orthopedic Evaluation    Patient Name: Raúl Garcia  MRN: [id number]  Today's Date: 3/24/2025    Insurance:  Visit number: 1 of [place]  Authorization info: Yes  Insurance Type: Payor: [employer] / Plan: [employer] MEDICARE ADVANTAGE / Product Type: *No Product type* /    Current Problem  Problem List Items Addressed This Visit             ICD-10-CM    Right hip pain M25.551    Relevant Orders    Follow Up In Physical Therapy    Balance disorder R26.89    Difficulty walking - Primary R26.2    Relevant Orders    Follow Up In Physical Therapy    Acute pain of right shoulder M25.511    Relevant Orders    Follow Up In Physical Therapy    Stiffness of right shoulder joint M25.611    Relevant Orders    Follow Up In Physical Therapy     Other Visit Diagnoses         Codes    Periprosthetic fracture around internal prosthetic right hip joint, initial encounter     M97.01XA            General:  General  Reason for Referral: R humerus and R hip fx  Referred By: Dr. Ankush MD  Precautions:  Precautions  Precautions Comment: hx of syncope, A fib  Medical History Form: Reviewed (scanned into chart)    Subjective:   WILMA: Pt reports to evaluation due to a R humerus fx and hip fx that occurred from a fall on 12/4/24. Pt was leaving a restaurant when he tripped and landed on his R side. He is currently walking with a walker. Pt is currently not in much pain with his shoulder and hip but feels like his balance has been impairment. He is not able to lift his arm over his head without pain. Pt has been getting xrays that show his fractures have been healing.    Previous Med Management: Skilled nursing and resting at home     PMH: BL hip replacements, Hx of falls     Aggravating Factors: Shoulder: reaching his arm away from his body  Hip: Not much besides being on his feet for too long     Relieving Factors: Shoulder: Keeping arm by his side  Hip: rest     Pain/Symptom Scale:  Current: Shoulder: 0/10  Hip: 0/10  Worst: Shoulder: 4/10  Hip: 2/10  Best: 0/10 for both   Location/Nature: outside of his shoulder and hip and describes as achy   Meds: Review chart     PLOF: Pt was able to walk without any assistance prior to the fall   ADL: Needs slight assistance   Work: Retired    Athletics: Walks for exercise   Recreation/ Hobbies: Active in his [employer]     Goals: Pt would like to get back to his PLOF that he was at prior to his fall     Language: English      Red Flags: Do you have any of the following? No  Fever/chills, unexplained weight changes, dizziness/fainting, unexplained change in bowel or bladder functions, unexplained malaise or muscle weakness, night pain/sweats, numbness or tingling    Objective   Palpation/Observation   Pt is currently ambulating with wheeled walker and step through gait pattern.     Shoulder ROM   Flexion- R: 90 with pain L: 140  Abduction- R: 90 with pain L: 140  ER - R: 15 L: 20  IR - R: 35 L: 45    Hip ROM   Flexion- R: 110 L: 110  Abduction- R: 35 L: 30  ER - R: 30 L: 30  IR - R: 20 L: 20    Shoulder MMT  Flexion- R: 4-/5 L:  4/5  Abduction- R: 4-/5 L: 4/5  ER - R: 4-/5 L: 4/5  IR - R: 4/5 L: 4/5    Hip MMT  Flexion- R: 4/5 L: 4/5  Abduction- R: 4/5 L: 4/5   Adduction - R:4/5 L: 4/5  Extension - R:4/5 L: 4/5    Outcome Measures:  Other Measures  Disability of Arm Shoulder Hand (DASH): 25/55  Lower Extremity Funtional Score (LEFS): 40/80     Treatments:  Access Code: [email or web address]  URL: [email or web address]  Date: 03/24/2025  Prepared by: Magdaleno Costa    Exercises  - Supine Shoulder Flexion Extension AAROM with Dowel  - 1 x daily - 7 x weekly - 3 sets - 10 reps  - Supine Shoulder Flexion Extension Full Range AROM  - 1 x daily - 7 x weekly - 3 sets - 10 reps  - Hooklying Clamshell with Resistance  - 1 x daily - 7 x weekly - 3 sets - 10 reps  - Supine Hip Adduction Isometric with Ball  - 1 x daily - 7 x weekly - 3 sets - 10 reps    EDUCATION: Home exercise  program, plan of care, activity modifications, pain management, and injury pathology       Assessment:     Patient is a 80 year old male that presents to physical therapy evaluation today after a fall that caused a R hip and R humerus fxs. Patient impairments include flexibility deficits of R shoulder and hip, strength deficits in hip and shoulder musculature, and motor control deficits including difficulty with balance. Due to these impairments, the patients has the following functional limitations: pain with reaching away from body, difficulty walking without an assistive device, and an overall decrease in functional mobility. Skilled therapy recommended in order to to address their impairments and progress towards the associated functional goals. Prognosis is fair secondary to their current presentation and client understanding. The pt demonstrates a good understanding of their current plan of care, rehab expectations, and prognosis.          Plan:     Planned Interventions include: therapeutic exercise, self-care home management, manual therapy, therapeutic activities, gait training, neuromuscular coordination, vasopneumatic, dry needling, aquatic therapy  Frequency: 1 x Week  Duration: 8 Weeks  Goals: Set and discussed today  Physical Therapy - Physical Therapy - July 2024 Problems       Physical Therapy - Physical Therapy - July 2024 Problems (Active)       PT Problem       PT Goals (Progressing)       Start:  07/02/24       1.Pt will increase LEFS with 55/80 or better in order to demo an increase in L hip function  2.  Pt will demo 4+/5 or all hip/knee MMT in order to demo an increase in LE strength   3. Pt will be able to ascend and descend 10 steps with reciprocal gait pattern and proper knee control in order to demo and increase in functional mobility   4. Pt will demo compliance and independence with HEP   5.  Pt will be able to walk for 10 mins with step through gait pattern and non antalgic gait pattern  with no AD  6. Pt will increase miniBest-est by a minimum of 3 points in order to demo an increase in functional balance               R humerus and hip fx  Problems       R humerus and hip fx  Problems (Active)       PT Problem       PT Goals       Start:  03/24/25       1. Pt will decrease QuickDASH to 20/55 or better in order to demonstrate an increase in UE function   2. Pt will increase his R shoulder flexion, abduction, and ER by a minimum of 10 degrees in order to demonstrate an increase in UE ROM.   3. Pt will increase all UE MMT to 4+/5 or better in order to demonstrate an increase in functional strength  4. Pt will be able to place items in high cabinet with no increase in pain or discomfort in order to demonstrate an increase in functional mobility.   5. Pt will increase LEFS to 60/80 in order to demo an increase in LE function  6. Pt will be able to walk without assistive device for 10 mins with no increase in hip pain or loss of balance                   Plan of care was developed with input and agreement by the patient  Ambulatory Screenings Summary       Screening  Frequency  Date Last Completed   Spiritual and Cultural Beliefs   Screening  each visit or episode of care    Falls Risk Screening  every ambulatory visit    Pain Screening  annually at primary care visit  2/27/2025   Domestic Violence screening  annually at primary care visit    Elder Abuse Screening  annually at primary care visit    Depression Screening  annually in the primary care setting 5/20/2024   Suicide Risk Screening  annually in the primary care setting 5/7/2024   Nutrition and Food Insecurity   Screening  at least annually at primary care visit     Key Learner  annually in the primary care setting    Drug Screen  2/27/2025 11:31 AM   Alcohol Screen  2/27/2025 11:31 AM   Advance Directive         Time Calculation  Start Time: 1230  Stop Time: 1315  Time Calculation (min): 45 min  PT Evaluation Time Entry  PT Evaluation (Low)  Time Entry: 25 PT Therapeutic Procedures Time Entry  Therapeutic Exercise Time Entry: 15

## 2025-03-26 ENCOUNTER — PATIENT MESSAGE (OUTPATIENT)
Dept: PRIMARY CARE | Facility: CLINIC | Age: 80
End: 2025-03-26
Payer: MEDICARE

## 2025-03-26 DIAGNOSIS — G31.84 MCI (MILD COGNITIVE IMPAIRMENT): Primary | ICD-10-CM

## 2025-03-26 DIAGNOSIS — I63.9 CEREBROVASCULAR ACCIDENT (CVA), UNSPECIFIED MECHANISM (MULTI): ICD-10-CM

## 2025-04-02 ENCOUNTER — APPOINTMENT (OUTPATIENT)
Dept: ORTHOPEDIC SURGERY | Facility: HOSPITAL | Age: 80
End: 2025-04-02
Payer: MEDICARE

## 2025-04-02 ENCOUNTER — HOSPITAL ENCOUNTER (OUTPATIENT)
Dept: RADIOLOGY | Facility: HOSPITAL | Age: 80
Discharge: HOME | End: 2025-04-02
Payer: MEDICARE

## 2025-04-02 ENCOUNTER — OFFICE VISIT (OUTPATIENT)
Dept: ORTHOPEDIC SURGERY | Facility: HOSPITAL | Age: 80
End: 2025-04-02
Payer: MEDICARE

## 2025-04-02 DIAGNOSIS — M97.01XA PERIPROSTHETIC FRACTURE AROUND INTERNAL PROSTHETIC RIGHT HIP JOINT, INITIAL ENCOUNTER: ICD-10-CM

## 2025-04-02 DIAGNOSIS — M97.01XA PERIPROSTHETIC FRACTURE AROUND INTERNAL PROSTHETIC RIGHT HIP JOINT, INITIAL ENCOUNTER: Primary | ICD-10-CM

## 2025-04-02 PROCEDURE — 99214 OFFICE O/P EST MOD 30 MIN: CPT | Performed by: STUDENT IN AN ORGANIZED HEALTH CARE EDUCATION/TRAINING PROGRAM

## 2025-04-02 PROCEDURE — 1123F ACP DISCUSS/DSCN MKR DOCD: CPT | Performed by: STUDENT IN AN ORGANIZED HEALTH CARE EDUCATION/TRAINING PROGRAM

## 2025-04-02 PROCEDURE — 73502 X-RAY EXAM HIP UNI 2-3 VIEWS: CPT | Mod: RT

## 2025-04-02 PROCEDURE — G2211 COMPLEX E/M VISIT ADD ON: HCPCS | Performed by: STUDENT IN AN ORGANIZED HEALTH CARE EDUCATION/TRAINING PROGRAM

## 2025-04-02 NOTE — PROGRESS NOTES
Jazmin Lechuga MD   Adult Reconstruction and Joint Replacement Surgery  Phone: [phone]     Fax: [phone]       Name: Raúl Garcia  Age: 80 y.o.   : 1945   Date of Visit: 2025    Follow-up Hip    CC: Follow-up RIGHT hip    History of Present Illness:  This patient presents with several months of RIGHT hip pain.     They last saw me for this problem on 2025. At the last visit, the patient was encouraged to transition from walker to cane as able as a became more stable.  He was encouraged to continue physical therapy and transition to outpatient setting. The patient reports pain continues to improve.  He has very mild right hip pain.  He continues with a walker as he is having continued gait instability and some dementia issues per his wife.    Patient has tried the following Activity modification, Tylenol (arthritis dosing) , Physical therapy, and Xray. Date of last steroid injection: Never for this problem. Patient does not have pain at night. The patient does not report falls related to this problem. Patient is able to walk indoors only. Patient is currently using walker as assistive device. Primarily complains of lateral hip pain. Patient has difficulty with getting up out of a chair, stairs, getting in and out of a car. The pain is significantly impacting their ability to perform activities of daily living. Patient reports no longer able to do activities such as walking longer distances, walking without a walker.  He     The patient reports no fever, chills or night sweats. No wound drainage. No interval trauma. No recent dental work.      Implants in place: Ricardo Valenzuela  Date of initial surgery: 2019    HISTORY  PROMs   No questionnaires on file.     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 impaction    Other conditions influencing health status 2014    Blockage of ear     Personal history of colonic polyps 05/13/2020    History of adenomatous polyp of colon    Personal history of other diseases of the musculoskeletal system and connective tissue     Personal history of arthritis    Personal history of other diseases of the nervous system and sense organs     History of cataract       Past Medical History:   Diagnosis Date    Impacted cerumen, bilateral 12/07/2016    Bilateral hearing loss due to cerumen impaction    Other conditions influencing health status 01/20/2014    Blockage of ear    Personal history of colonic polyps 05/13/2020    History of adenomatous polyp of colon    Personal history of other diseases of the musculoskeletal system and connective tissue     Personal history of arthritis    Personal history of other diseases of the nervous system and sense organs     History of cataract     Documented in chart and reviewed.     Past Surgical History:   Procedure Laterality Date    CATARACT EXTRACTION  02/19/2014    Cataract Surgery    OTHER SURGICAL HISTORY  01/20/2014    Leg Repair    OTHER SURGICAL HISTORY  05/08/2019    Hip replacement    TOTAL HIP ARTHROPLASTY  04/12/2017    Hip Replacement       Allergies: He is allergic to amlodipine, ibuprofen, and penicillins.     Medications:  Current Outpatient Medications   Medication Instructions    acetaminophen (TYLENOL) 1,000 mg, 3 times daily    atorvastatin (LIPITOR) 20 mg, oral, Daily    famciclovir (FAMVIR) 125 mg, Daily    hypromellose ([place]) 2.5 % ophthalmic solution 1 drop, 4 times daily PRN    lidocaine (Lidocare) 4 % patch 1 patch, Daily    metoprolol succinate XL (TOPROL-XL) 25 mg, oral, Daily, Do not crush or chew. nHold for SBP <110 or HR <60    multivitamin tablet 1 tablet, Daily    potassium chloride CR 20 mEq ER tablet 20 mEq, Daily    sodium bicarbonate 650 mg, oral, Daily       No family history on file.  Documented in chart and reviewed.     Social History     Tobacco Use    Smoking status: Never      Passive exposure: Never    Smokeless tobacco: Never   Substance Use Topics    Alcohol use: Yes     Alcohol/week: 14.0 standard drinks of alcohol     Types: 14 Standard drinks or equivalent per week     Comment: Occasionally         Review of Systems:  Review of systems completed with medical assistant intake. Please refer to this note.     Physical Exam:  BMI: 21.    General: The patient is well appearing and has an appropriate affect.      Neurological Examination: SILT in SPN/DPN/Sural/Saphenous/Tibial nerves. 5/5 EHL, FHL, Tibial anterior, Gastrocnemius. Coordination grossly intact.      Cardiovascular Exam: Capillary refill <2 seconds.      Lymphatic Examination: There is no obvious lymphatic swelling present around the involved joint.     Skin Exam: Skin around the pertinent joint is without evidence of infection or rash.     Gait: patient ambulates with coxalgic gait.     Left Hip Examination:     Examination of the hip reveals the skin to be intact.  Healed posterior incision.     There is mild tenderness over the greater trochanter, mainly at the gluteus medius tendon sheath insertion to the greater trochanter.  There is no crepitation.  There are no overlying skin changes.     There is no obvious swelling.     There is a negative Stinchfield test.     Range of motion is: full extension to 95 degrees of flexion.     The hip internally rotates to 15 degrees and externally rotates to 35 degrees.     Abduction is 35 degrees and adduction is 15 degrees.     There is no significant groin and buttock pain with hip motion.     Abductor strength 4/5.     Right Hip Examination:     Examination of the hip reveals the skin to be intact.  Healed posterior incision.     There is mild tenderness over the greater trochanter.     There is no obvious swelling.     There is a negative Stinchfield test.     Range of motion is: full extension to 100 degrees of flexion.     The hip internally rotates to 15 degrees and externally  rotates to 40 degrees.     Abduction is 40 degrees and adduction is 20 degrees.     There is no significant groin and buttock pain with hip motion.     Abductor strength 4/5.     Right Knee Examination:  Examination of the right knee reveals the skin to be intact. There is no obvious swelling.     There is no tenderness to palpation.     Range of motion is 5-115 degrees of flexion.     The knee is stable.     There is no grinding with range of motion.     There is no patellofemoral crepitus.     Left Knee Examination:  Examination of the left knee reveals the skin to be intact. There is no obvious swelling.     There is no tenderness to palpation.     Range of motion is 5-115 degrees of flexion.     The knee is stable.     There is no grinding with range of motion.     There is no patellofemoral crepitus.    Imaging:  X-rays were personally reviewed today and show implants in good position with no evidence of complication.  Stable appearing greater trochanteric periprosthetic femur fracture with interval callus deposition.    Impression and Plan:  This patient presents for follow-up evaluation of RIGHT hip.    DIAGNOSIS:   Periprosthetic fracture around internal prosthetic right hip joint, initial encounter     I have discussed the options in detail with the patient. We have discussed anti-inflammatory medication, activity modification, physical therapy, and revision hip replacement surgery.  There is no indication for surgery at this time.  The patient's fracture seems to be healing well.    Encouraged continued physical therapy and home exercises. Patient will continue their home exercise program.Strategies for pain management using over-the-counter anti-inflammatory medications reviewed.  Encouraged them to maintain range of motion and strength around the hip and knee joints.  They will continue to implement these strategies in addressing their pain.      Recommend the patient continue optimizing nonsurgical  treatment interventions as outlined above for management of their joint pain.  No indication for surgery at this time.  He will follow-up as needed.  The patient verbalizes understanding with the recommendations and treatment plan as outlined above and is in agreement.  Questions were addressed.    RTC: as needed    X-rays at next visit: As needed  _____________________  Jazmin Lechuga MD   Attending Orthopaedic Surgeon  [facility]    [facility]    This office note was transcribed with dictation software.  Please excuse any typographical errors, program misunderstandings leading to inadvertent insertions or deletions of inappropriate wording, pronoun errors and other unintentional transcription errors not noticed on proof-reading.

## 2025-04-16 ENCOUNTER — TREATMENT (OUTPATIENT)
Dept: PHYSICAL THERAPY | Facility: CLINIC | Age: 80
End: 2025-04-16
Payer: MEDICARE

## 2025-04-16 DIAGNOSIS — R26.89 BALANCE DISORDER: ICD-10-CM

## 2025-04-16 DIAGNOSIS — M25.552 PAIN OF LEFT HIP: ICD-10-CM

## 2025-04-16 DIAGNOSIS — R29.898 WEAKNESS OF LEFT HIP: ICD-10-CM

## 2025-04-16 PROCEDURE — 97110 THERAPEUTIC EXERCISES: CPT | Mod: GP,CQ

## 2025-04-16 NOTE — PROGRESS NOTES
Physical Therapy  Physical Therapy Orthopedic Evaluation    Patient Name: Raúl Garcia  MRN: [id number]  Today's Date: 4/16/2025    Insurance:  Visit number: 2 of 6  Authorization info: 3/24/25-5/22/25,  Insurance Type: Payor: ANTH MEDICARE / Plan: [employer] MEDICARE ADVANTAGE / Product Type: *No Product type* /    Current Problem  Problem List Items Addressed This Visit           ICD-10-CM    Weakness of left hip R29.898    Balance disorder R26.89     Other Visit Diagnoses         Codes      Pain of left hip     M25.552            General:     Precautions:     Medical History Form: Reviewed (scanned into chart)    Subjective:     No changes since eval.  Patient reports that he has some pain in hip when making certain movements but doesn't recall what specific movements. Shoulder is painful when trying to comb hair or reaching up.  Previous Med Management: Skilled nursing and resting at home     PMH: BL hip replacements, Hx of falls     Aggravating Factors: Shoulder: reaching his arm away from his body  Hip: Not much besides being on his feet for too long     Relieving Factors: Shoulder: Keeping arm by his side  Hip: rest     Pain/Symptom Scale:  Current: Shoulder: 0/10 Hip: 3/10  Worst: Shoulder: 4/10  Hip: 2/10  Best: 0/10 for both   Location/Nature: outside of his shoulder and hip and describes as achy   Meds: Review chart         Objective   Palpation/Observation   Pt is currently ambulating with wheeled walker and step through gait pattern.     Shoulder ROM   Flexion- R: 90 with pain L: 140  Abduction- R: 90 with pain L: 140  ER - R: 15 L: 20  IR - R: 35 L: 45    Hip ROM   Flexion- R: 110 L: 110  Abduction- R: 35 L: 30  ER - R: 30 L: 30  IR - R: 20 L: 20    Shoulder MMT  Flexion- R: 4-/5 L:  4/5  Abduction- R: 4-/5 L: 4/5  ER - R: 4-/5 L: 4/5  IR - R: 4/5 L: 4/5    Hip MMT  Flexion- R: 4/5 L: 4/5  Abduction- R: 4/5 L: 4/5   Adduction - R:4/5 L: 4/5  Extension - R:4/5 L: 4/5    Outcome Measures:         Treatments:  Access Code: [email or web address]  URL: [email or web address]  Date: 03/24/2025  Prepared by: Magdaleno Costa      Treatment:  Rec Bike x 6 min   Shuttle press 25#  2 x 10   LAQ 2#  3 x 10   HS curl YTB x 10   Sit to stand x 10   Supine hip abd 2 x 10   Supine hip add 2 x 10   Bridges 2 x 10       EDUCATION: Home exercise program, plan of care, activity modifications, pain management, and injury pathology       Assessment:     Patient tolerated strengthening well.  He had fatigue but no reported pain throughout.  Skilled therapy recommended in order to to address their impairments and progress towards the associated functional goals. Prognosis is fair secondary to their current presentation and client understanding. The pt demonstrates a good understanding of their current plan of care, rehab expectations, and prognosis.          Plan:   Continue strengthening of UE/LE, core and incorporate balance work.  Planned Interventions include: therapeutic exercise, self-care home management, manual therapy, therapeutic activities, gait training, neuromuscular coordination, vasopneumatic, dry needling, aquatic therapy  Frequency: 1 x Week  Duration: 8 Weeks  Goals: Set and discussed today  Active       PT Problem       PT Goals       Start:  03/24/25       1. Pt will decrease QuickDASH to 20/55 or better in order to demonstrate an increase in UE function   2. Pt will increase his R shoulder flexion, abduction, and ER by a minimum of 10 degrees in order to demonstrate an increase in UE ROM.   3. Pt will increase all UE MMT to 4+/5 or better in order to demonstrate an increase in functional strength  4. Pt will be able to place items in high cabinet with no increase in pain or discomfort in order to demonstrate an increase in functional mobility.   5. Pt will increase LEFS to 60/80 in order to demo an increase in LE function  6. Pt will be able to walk without assistive device for 10 mins with no  increase in hip pain or loss of balance                Plan of care was developed with input and agreement by the patient  Ambulatory Screenings Summary       Screening  Frequency  Date Last Completed   Spiritual and Cultural Beliefs   Screening  each visit or episode of care    Falls Risk Screening  every ambulatory visit    Pain Screening  annually at primary care visit  2/27/2025   Domestic Violence screening  annually at primary care visit    Elder Abuse Screening  annually at primary care visit    Depression Screening  annually in the primary care setting 5/20/2024   Suicide Risk Screening  annually in the primary care setting 5/7/2024   Nutrition and Food Insecurity   Screening  at least annually at primary care visit     Key Learner  annually in the primary care setting    Drug Screen  2/27/2025 11:31 AM   Alcohol Screen  2/27/2025 11:31 AM   Advance Directive         Time Calculation  Start Time: 1000  Stop Time: 1045  Time Calculation (min): 45 min    PT Therapeutic Procedures Time Entry  Therapeutic Exercise Time Entry: 40

## 2025-04-22 ENCOUNTER — TREATMENT (OUTPATIENT)
Dept: PHYSICAL THERAPY | Facility: CLINIC | Age: 80
End: 2025-04-22
Payer: MEDICARE

## 2025-04-22 DIAGNOSIS — M25.511 ACUTE PAIN OF RIGHT SHOULDER: ICD-10-CM

## 2025-04-22 DIAGNOSIS — R26.89 BALANCE DISORDER: ICD-10-CM

## 2025-04-22 DIAGNOSIS — M25.552 PAIN OF LEFT HIP: ICD-10-CM

## 2025-04-22 DIAGNOSIS — M25.611 STIFFNESS OF RIGHT SHOULDER JOINT: Primary | ICD-10-CM

## 2025-04-22 DIAGNOSIS — R26.2 DIFFICULTY WALKING: ICD-10-CM

## 2025-04-22 DIAGNOSIS — M25.551 RIGHT HIP PAIN: ICD-10-CM

## 2025-04-22 DIAGNOSIS — R29.898 WEAKNESS OF LEFT HIP: ICD-10-CM

## 2025-04-22 PROCEDURE — 97110 THERAPEUTIC EXERCISES: CPT | Mod: GP

## 2025-04-22 NOTE — PROGRESS NOTES
Physical Therapy  Physical Therapy Treatment Note    Patient Name: Raúl Garcia  MRN: [id number]  Today's Date: 4/22/2025  Time Calculation  Start Time: 0935  Stop Time: 1020  Time Calculation (min): 45 min  PT Therapeutic Procedures Time Entry  Therapeutic Exercise Time Entry: 39        Insurance:  Visit number: 3 of 6  Authorization info: Auth needed   Insurance Type: Payor: ANTH MEDICARE / Plan: [email or web address] MEDICARE ADVANTAGE / Product Type: *No Product type* /   Current Problem  1. Stiffness of right shoulder joint        2. Weakness of left hip  Follow Up In Physical Therapy      3. Pain of left hip  Follow Up In Physical Therapy      4. Balance disorder  Follow Up In Physical Therapy      5. Right hip pain        6. Difficulty walking        7. Acute pain of right shoulder          General  Reason for Referral: R humerus and R hip fx  Referred By: Dr. Ankush MD  Precautions  Precautions  Precautions Comment: hx of syncope, A fib  Subjective:     Patient reports that he continues to have to use his walker for his balance. He has been able to keep up with his HEP on most days.   Pain     Objective:   Palpation/Observation   Pt is currently ambulating with wheeled walker and step through gait pattern.      Shoulder ROM   Flexion- R: 90 with pain L: 140  Abduction- R: 90 with pain L: 140  ER - R: 15 L: 20  IR - R: 35 L: 45     Hip ROM   Flexion- R: 110 L: 110  Abduction- R: 35 L: 30  ER - R: 30 L: 30  IR - R: 20 L: 20     Shoulder MMT  Flexion- R: 4-/5 L:  4/5  Abduction- R: 4-/5 L: 4/5  ER - R: 4-/5 L: 4/5  IR - R: 4/5 L: 4/5     Hip MMT  Flexion- R: 4/5 L: 4/5  Abduction- R: 4/5 L: 4/5   Adduction - R:4/5 L: 4/5  Extension - R:4/5 L: 4/5     Outcome Measures:  Other Measures  Disability of Arm Shoulder Hand (DASH): 25/55  Lower Extremity Funtional Score (LEFS): 40/80  Treatments:     THERE EX  5 min recumbent bike  Seated LAQ 3 x 10   Seated heel slide 3 x 10 (red)   Seated marching 3 x 10   Seated row (light)  3 x 10   Sit to stand with UE pull 2 x 10     MANUAL        Assessment:  Pt tolerated session well. Pt verbalized that he had an increase in L hip pain when LAQ was performed with over 2 lbs of weight. He did much better unweighted. He was able to continue to progress other LE strengthening exercises without any increase in pain. Pt continues to progress towards goals established in the POC and should continue with skilled therapy.       Plan: Continue to progress hip and shoulder ROM and strengthening      Goals:  Active       PT Problem       PT Goals       Start:  03/24/25       1. Pt will decrease QuickDASH to 20/55 or better in order to demonstrate an increase in UE function   2. Pt will increase his R shoulder flexion, abduction, and ER by a minimum of 10 degrees in order to demonstrate an increase in UE ROM.   3. Pt will increase all UE MMT to 4+/5 or better in order to demonstrate an increase in functional strength  4. Pt will be able to place items in high cabinet with no increase in pain or discomfort in order to demonstrate an increase in functional mobility.   5. Pt will increase LEFS to 60/80 in order to demo an increase in LE function  6. Pt will be able to walk without assistive device for 10 mins with no increase in hip pain or loss of balance

## 2025-04-25 ENCOUNTER — TELEPHONE (OUTPATIENT)
Dept: PRIMARY CARE | Facility: CLINIC | Age: 80
End: 2025-04-25
Payer: MEDICARE

## 2025-04-29 ENCOUNTER — TREATMENT (OUTPATIENT)
Dept: PHYSICAL THERAPY | Facility: CLINIC | Age: 80
End: 2025-04-29
Payer: MEDICARE

## 2025-04-29 DIAGNOSIS — R29.898 WEAKNESS OF LEFT HIP: ICD-10-CM

## 2025-04-29 DIAGNOSIS — M25.552 PAIN OF LEFT HIP: ICD-10-CM

## 2025-04-29 DIAGNOSIS — R26.89 BALANCE DISORDER: ICD-10-CM

## 2025-04-29 PROCEDURE — 97110 THERAPEUTIC EXERCISES: CPT | Mod: GP

## 2025-04-29 NOTE — PROGRESS NOTES
Physical Therapy  Physical Therapy Treatment Note    Patient Name: Raúl Garcia  MRN: [id number]  Today's Date: 4/29/2025  Time Calculation  Start Time: 0930  Stop Time: 1015  Time Calculation (min): 45 min  PT Therapeutic Procedures Time Entry  Therapeutic Exercise Time Entry: 39        Insurance:  Visit number: 4 of 6  Authorization info: Auth needed   Insurance Type: Payor: ANTH MEDICARE / Plan: [employer] MEDICARE ADVANTAGE / Product Type: *No Product type* /   Current Problem  1. Weakness of left hip  Follow Up In Physical Therapy      2. Pain of left hip  Follow Up In Physical Therapy      3. Balance disorder  Follow Up In Physical Therapy        General  Reason for Referral: R humerus and R hip fx  Referred By: Dr. Ankush MD  Precautions  Precautions  Precautions Comment: hx of syncope, A fib  Subjective:     Patient had a major increase in soreness after last session that lasted for a day.   Pain     Objective:   Palpation/Observation   Pt is currently ambulating with wheeled walker and step through gait pattern.      Shoulder ROM   Flexion- R: 90 with pain L: 140  Abduction- R: 90 with pain L: 140  ER - R: 15 L: 20  IR - R: 35 L: 45     Hip ROM   Flexion- R: 110 L: 110  Abduction- R: 35 L: 30  ER - R: 30 L: 30  IR - R: 20 L: 20     Shoulder MMT  Flexion- R: 4-/5 L:  4/5  Abduction- R: 4-/5 L: 4/5  ER - R: 4-/5 L: 4/5  IR - R: 4/5 L: 4/5     Hip MMT  Flexion- R: 4/5 L: 4/5  Abduction- R: 4/5 L: 4/5   Adduction - R:4/5 L: 4/5  Extension - R:4/5 L: 4/5     Outcome Measures:  Other Measures  Disability of Arm Shoulder Hand (DASH): 25/55  Lower Extremity Funtional Score (LEFS): 40/80  Treatments:     THERE EX  5 min recumbent bike  Seated adduction 3 x 10   Seated clamshell 3 x 10 (yellow)   Seated row (yellow) 3 x 10   Seated dowel shoulder flexion 2 x 10   Standing marching 2 x 10       MANUAL        Assessment:  Pt tolerated session well. Pt was able to continue to work on LE and UE strengthening without  any increase in discomfort. Pt was educated to continue to work on HEP. Pt continues to progress towards goals established in the POC and should continue with skilled therapy.       Plan: Continue to progress hip and shoulder ROM and strengthening      Goals:  Active       PT Problem       PT Goals       Start:  03/24/25       1. Pt will decrease QuickDASH to 20/55 or better in order to demonstrate an increase in UE function   2. Pt will increase his R shoulder flexion, abduction, and ER by a minimum of 10 degrees in order to demonstrate an increase in UE ROM.   3. Pt will increase all UE MMT to 4+/5 or better in order to demonstrate an increase in functional strength  4. Pt will be able to place items in high cabinet with no increase in pain or discomfort in order to demonstrate an increase in functional mobility.   5. Pt will increase LEFS to 60/80 in order to demo an increase in LE function  6. Pt will be able to walk without assistive device for 10 mins with no increase in hip pain or loss of balance

## 2025-05-20 ENCOUNTER — TREATMENT (OUTPATIENT)
Dept: PHYSICAL THERAPY | Facility: CLINIC | Age: 80
End: 2025-05-20
Payer: MEDICARE

## 2025-05-20 DIAGNOSIS — R26.2 DIFFICULTY WALKING: ICD-10-CM

## 2025-05-20 DIAGNOSIS — R26.89 BALANCE DISORDER: ICD-10-CM

## 2025-05-20 DIAGNOSIS — M25.611 STIFFNESS OF RIGHT SHOULDER JOINT: ICD-10-CM

## 2025-05-20 DIAGNOSIS — M25.511 ACUTE PAIN OF RIGHT SHOULDER: ICD-10-CM

## 2025-05-20 DIAGNOSIS — M25.551 RIGHT HIP PAIN: ICD-10-CM

## 2025-05-20 PROCEDURE — 97110 THERAPEUTIC EXERCISES: CPT | Mod: GP

## 2025-05-20 NOTE — PROGRESS NOTES
Physical Therapy  Physical Therapy Treatment Note    Patient Name: Raúl Garcia  MRN: [id number]  Today's Date: 5/20/2025  Time Calculation  Start Time: 1345  Stop Time: 1430  Time Calculation (min): 45 min  PT Therapeutic Procedures Time Entry  Therapeutic Exercise Time Entry: 39        Insurance:  Visit number: 5 of 6  Authorization info: Auth needed   Insurance Type: Payor: ANTH MEDICARE / Plan: [employer] MEDICARE ADVANTAGE / Product Type: *No Product type* /   Current Problem  1. Balance disorder        2. Difficulty walking  Follow Up In Physical Therapy      3. Right hip pain  Follow Up In Physical Therapy      4. Stiffness of right shoulder joint  Follow Up In Physical Therapy      5. Acute pain of right shoulder  Follow Up In Physical Therapy        General  Reason for Referral: R humerus and R hip fx  Referred By: Dr. Ankush MD  Precautions  Precautions  Precautions Comment: hx of syncope, A fib  Subjective:     Patient has continued to have trouble seeing which has caused his balance to be off. He has not been in much pain in his shoulder or hip besides when he reaches his hand behind his head.    Pain     Objective:   Palpation/Observation   Pt is currently ambulating with wheeled walker and step through gait pattern.      Shoulder ROM   Flexion- R: 90 with pain L: 140  Abduction- R: 90 with pain L: 140  ER - R: 15 L: 20  IR - R: 35 L: 45     Hip ROM   Flexion- R: 110 L: 110  Abduction- R: 35 L: 30  ER - R: 30 L: 30  IR - R: 20 L: 20     Shoulder MMT  Flexion- R: 4-/5 L:  4/5  Abduction- R: 4-/5 L: 4/5  ER - R: 4-/5 L: 4/5  IR - R: 4/5 L: 4/5     Hip MMT  Flexion- R: 4/5 L: 4/5  Abduction- R: 4/5 L: 4/5   Adduction - R:4/5 L: 4/5  Extension - R:4/5 L: 4/5     Outcome Measures:  Other Measures  Disability of Arm Shoulder Hand (DASH): 18/55  Lower Extremity Funtional Score (LEFS): 50/80  Treatments:     THERE EX  5 min recumbent bike  UE MMT   LE MMT  Outcome measure education   Seated LAQ (2#) 2 x 10    Seated hamstring curl (green) 3 x 10  Seated adduction 2 x 10   Seated banded rows 3 x 10         MANUAL        Assessment:  Pt tolerated session well. Pt was able to continue to work on both LE and UE strengthening without any increase in hip or shoulder pain.  Pt continues to progress towards goals established in the POC and should continue with skilled therapy.         Plan: Continue to progress hip and shoulder ROM and strengthening      Goals:  Active       PT Problem       PT Goals (Progressing)       Start:  03/24/25       1. Pt will decrease QuickDASH to 20/55 or better in order to demonstrate an increase in UE function   2. Pt will increase his R shoulder flexion, abduction, and ER by a minimum of 10 degrees in order to demonstrate an increase in UE ROM.   3. Pt will increase all UE MMT to 4+/5 or better in order to demonstrate an increase in functional strength  4. Pt will be able to place items in high cabinet with no increase in pain or discomfort in order to demonstrate an increase in functional mobility.   5. Pt will increase LEFS to 60/80 in order to demo an increase in LE function  6. Pt will be able to walk without assistive device for 10 mins with no increase in hip pain or loss of balance

## 2025-05-28 ENCOUNTER — APPOINTMENT (OUTPATIENT)
Dept: PRIMARY CARE | Facility: CLINIC | Age: 80
End: 2025-05-28
Payer: MEDICARE

## 2025-05-28 VITALS
HEIGHT: 66 IN | BODY MASS INDEX: 20.73 KG/M2 | DIASTOLIC BLOOD PRESSURE: 73 MMHG | TEMPERATURE: 97.1 F | OXYGEN SATURATION: 98 % | WEIGHT: 129 LBS | SYSTOLIC BLOOD PRESSURE: 144 MMHG | HEART RATE: 77 BPM

## 2025-05-28 DIAGNOSIS — E87.1 HYPONATREMIA: ICD-10-CM

## 2025-05-28 DIAGNOSIS — Z00.00 HEALTHCARE MAINTENANCE: Primary | ICD-10-CM

## 2025-05-28 DIAGNOSIS — E78.00 HYPERCHOLESTEROLEMIA: ICD-10-CM

## 2025-05-28 DIAGNOSIS — I10 BENIGN ESSENTIAL HYPERTENSION: ICD-10-CM

## 2025-05-28 DIAGNOSIS — M97.8XXD PERIPROSTHETIC FRACTURE OF HIP, SUBSEQUENT ENCOUNTER: ICD-10-CM

## 2025-05-28 DIAGNOSIS — E78.00 HIGH CHOLESTEROL: ICD-10-CM

## 2025-05-28 DIAGNOSIS — R73.03 PREDIABETES: ICD-10-CM

## 2025-05-28 DIAGNOSIS — Z96.649 PERIPROSTHETIC FRACTURE OF HIP, SUBSEQUENT ENCOUNTER: ICD-10-CM

## 2025-05-28 DIAGNOSIS — M80.00XD AGE-RELATED OSTEOPOROSIS WITH CURRENT PATHOLOGICAL FRACTURE WITH ROUTINE HEALING, SUBSEQUENT ENCOUNTER: ICD-10-CM

## 2025-05-28 DIAGNOSIS — Z00.00 MEDICARE ANNUAL WELLNESS VISIT, SUBSEQUENT: ICD-10-CM

## 2025-05-28 PROBLEM — G47.00 INSOMNIA: Status: RESOLVED | Noted: 2025-01-13 | Resolved: 2025-05-28

## 2025-05-28 PROBLEM — E87.6 HYPOKALEMIA: Status: RESOLVED | Noted: 2024-12-10 | Resolved: 2025-05-28

## 2025-05-28 PROBLEM — Z86.0101 HISTORY OF ADENOMATOUS POLYP OF COLON: Status: RESOLVED | Noted: 2024-12-10 | Resolved: 2025-05-28

## 2025-05-28 PROBLEM — H18.529 CORNEAL EPITHELIAL BASEMENT MEMBRANE DYSTROPHY: Status: RESOLVED | Noted: 2024-12-10 | Resolved: 2025-05-28

## 2025-05-28 PROBLEM — I35.8 AORTIC VALVE SCLEROSIS: Status: RESOLVED | Noted: 2023-05-17 | Resolved: 2025-05-28

## 2025-05-28 PROBLEM — R33.9 URINARY RETENTION: Status: RESOLVED | Noted: 2024-12-10 | Resolved: 2025-05-28

## 2025-05-28 PROBLEM — S42.201D CLOSED FRACTURE OF PROXIMAL END OF RIGHT HUMERUS WITH ROUTINE HEALING: Status: RESOLVED | Noted: 2024-12-10 | Resolved: 2025-05-28

## 2025-05-28 PROBLEM — B97.89 VIRAL KERATITIS: Status: RESOLVED | Noted: 2023-05-17 | Resolved: 2025-05-28

## 2025-05-28 PROBLEM — R82.71 BACTERIURIA: Status: RESOLVED | Noted: 2024-12-24 | Resolved: 2025-05-28

## 2025-05-28 PROBLEM — R55 SYNCOPE: Status: RESOLVED | Noted: 2024-06-12 | Resolved: 2025-05-28

## 2025-05-28 PROBLEM — G62.9 PERIPHERAL NERVE DISEASE: Status: RESOLVED | Noted: 2023-05-17 | Resolved: 2025-05-28

## 2025-05-28 PROBLEM — M25.551 RIGHT HIP PAIN: Status: RESOLVED | Noted: 2024-07-02 | Resolved: 2025-05-28

## 2025-05-28 PROBLEM — I48.0 PAROXYSMAL ATRIAL FIBRILLATION (MULTI): Status: RESOLVED | Noted: 2024-12-05 | Resolved: 2025-05-28

## 2025-05-28 PROBLEM — D72.829 LEUKOCYTOSIS: Status: RESOLVED | Noted: 2024-12-24 | Resolved: 2025-05-28

## 2025-05-28 PROBLEM — R29.898 WEAKNESS OF LEFT HIP: Status: RESOLVED | Noted: 2024-07-02 | Resolved: 2025-05-28

## 2025-05-28 PROBLEM — R26.2 DIFFICULTY WALKING: Status: RESOLVED | Noted: 2025-03-24 | Resolved: 2025-05-28

## 2025-05-28 PROBLEM — Z98.890 HISTORY OF REPAIR OF HIP JOINT: Status: RESOLVED | Noted: 2023-05-17 | Resolved: 2025-05-28
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
## 2025-05-28 PROBLEM — H91.93 BILATERAL HEARING LOSS: Status: RESOLVED | Noted: 2024-12-10 | Resolved: 2025-05-28

## 2025-05-28 PROBLEM — H16.8 VIRAL KERATITIS: Status: RESOLVED | Noted: 2023-05-17 | Resolved: 2025-05-28

## 2025-05-28 PROBLEM — R26.89 BALANCE DISORDER: Status: RESOLVED | Noted: 2024-07-02 | Resolved: 2025-05-28

## 2025-05-28 PROBLEM — I24.89 DEMAND ISCHEMIA (MULTI): Status: RESOLVED | Noted: 2024-12-05 | Resolved: 2025-05-28

## 2025-05-28 PROBLEM — N17.9 AKI (ACUTE KIDNEY INJURY): Status: RESOLVED | Noted: 2024-12-05 | Resolved: 2025-05-28

## 2025-05-28 PROBLEM — R41.0 DELIRIUM: Status: RESOLVED | Noted: 2024-12-05 | Resolved: 2025-05-28

## 2025-05-28 PROBLEM — Z71.89 ADVANCE CARE PLANNING: Status: RESOLVED | Noted: 2024-12-10 | Resolved: 2025-05-28

## 2025-05-28 PROBLEM — N18.30 STAGE 3 CHRONIC KIDNEY DISEASE (MULTI): Status: RESOLVED | Noted: 2024-12-10 | Resolved: 2025-05-28

## 2025-05-28 PROBLEM — R53.81 DEBILITY: Status: RESOLVED | Noted: 2024-12-10 | Resolved: 2025-05-28

## 2025-05-28 PROBLEM — M25.611 STIFFNESS OF RIGHT SHOULDER JOINT: Status: RESOLVED | Noted: 2025-03-24 | Resolved: 2025-05-28

## 2025-05-28 PROBLEM — M25.511 ACUTE PAIN OF RIGHT SHOULDER: Status: RESOLVED | Noted: 2025-03-24 | Resolved: 2025-05-28

## 2025-05-28 PROBLEM — S72.001A CLOSED FRACTURE OF RIGHT HIP: Status: RESOLVED | Noted: 2024-12-05 | Resolved: 2025-05-28

## 2025-05-28 PROBLEM — E55.9 VITAMIN D DEFICIENCY: Status: RESOLVED | Noted: 2024-12-10 | Resolved: 2025-05-28

## 2025-05-28 PROCEDURE — 99214 OFFICE O/P EST MOD 30 MIN: CPT | Performed by: STUDENT IN AN ORGANIZED HEALTH CARE EDUCATION/TRAINING PROGRAM

## 2025-05-28 PROCEDURE — 1160F RVW MEDS BY RX/DR IN RCRD: CPT | Performed by: STUDENT IN AN ORGANIZED HEALTH CARE EDUCATION/TRAINING PROGRAM

## 2025-05-28 PROCEDURE — 1158F ADVNC CARE PLAN TLK DOCD: CPT | Performed by: STUDENT IN AN ORGANIZED HEALTH CARE EDUCATION/TRAINING PROGRAM

## 2025-05-28 PROCEDURE — G0439 PPPS, SUBSEQ VISIT: HCPCS | Performed by: STUDENT IN AN ORGANIZED HEALTH CARE EDUCATION/TRAINING PROGRAM

## 2025-05-28 PROCEDURE — 1126F AMNT PAIN NOTED NONE PRSNT: CPT | Performed by: STUDENT IN AN ORGANIZED HEALTH CARE EDUCATION/TRAINING PROGRAM

## 2025-05-28 PROCEDURE — 3077F SYST BP >= 140 MM HG: CPT | Performed by: STUDENT IN AN ORGANIZED HEALTH CARE EDUCATION/TRAINING PROGRAM

## 2025-05-28 PROCEDURE — 1036F TOBACCO NON-USER: CPT | Performed by: STUDENT IN AN ORGANIZED HEALTH CARE EDUCATION/TRAINING PROGRAM

## 2025-05-28 PROCEDURE — 3078F DIAST BP <80 MM HG: CPT | Performed by: STUDENT IN AN ORGANIZED HEALTH CARE EDUCATION/TRAINING PROGRAM

## 2025-05-28 PROCEDURE — 1159F MED LIST DOCD IN RCRD: CPT | Performed by: STUDENT IN AN ORGANIZED HEALTH CARE EDUCATION/TRAINING PROGRAM

## 2025-05-28 PROCEDURE — 1170F FXNL STATUS ASSESSED: CPT | Performed by: STUDENT IN AN ORGANIZED HEALTH CARE EDUCATION/TRAINING PROGRAM

## 2025-05-28 RX ORDER — ATORVASTATIN CALCIUM 20 MG/1
20 TABLET, FILM COATED ORAL DAILY
Qty: 90 TABLET | Refills: 3 | Status: SHIPPED | OUTPATIENT
Start: 2025-05-28 | End: 2026-05-28

## 2025-05-28 ASSESSMENT — PATIENT HEALTH QUESTIONNAIRE - PHQ9
SUM OF ALL RESPONSES TO PHQ9 QUESTIONS 1 AND 2: 0
1. LITTLE INTEREST OR PLEASURE IN DOING THINGS: NOT AT ALL
2. FEELING DOWN, DEPRESSED OR HOPELESS: NOT AT ALL

## 2025-05-28 ASSESSMENT — ENCOUNTER SYMPTOMS
DEPRESSION: 0
LOSS OF SENSATION IN FEET: 0
OCCASIONAL FEELINGS OF UNSTEADINESS: 1

## 2025-05-28 ASSESSMENT — ACTIVITIES OF DAILY LIVING (ADL)
GROCERY_SHOPPING: NEEDS ASSISTANCE
MANAGING_FINANCES: INDEPENDENT
TAKING_MEDICATION: INDEPENDENT
DOING_HOUSEWORK: NEEDS ASSISTANCE
BATHING: INDEPENDENT
ADLS_COMMENTS: USES WALKER/CANE
DRESSING: INDEPENDENT

## 2025-05-28 ASSESSMENT — PAIN SCALES - GENERAL: PAINLEVEL_OUTOF10: 0-NO PAIN

## 2025-05-28 NOTE — PATIENT INSTRUCTIONS
Your blood work is up to date; no need for additional draw at this appointment    I have renewed your chronic medications today.     Follow up with your specialists as previously scheduled.     See me in 6 months for follow up.

## 2025-05-28 NOTE — PROGRESS NOTES
Subjective   Reason for Visit: Raúl Garcia is an80 y.o. male who presents for a Medicare Wellness visit.    Past Medical, Surgical, and Family History reviewed and updated in chart.    Reviewed all medications by prescribing practitioner or clinical pharmacist (such as prescriptions, OTCs, herbal therapies and supplements) and documented in the medical record.    History of Present Illness  MWV today.     He reports no recent falls or syncopal events since his last visit in 02/2025. He has been using a walker or cane for mobility and possesses an active handicap placard for his vehicle. He feels safe at home. He does not use hearing aids and manages his personal hygiene independently. He continues to manage his finances and grocery shopping independently. His wife assists with housework, including laundry and dishes. He has not experienced any symptoms of depression or hopelessness in the past two weeks and continues to engage in hobbies and activities he enjoys. He reports no cognitive impairment, although his wife notes some short-term memory issues. He describes his health as excellent, consumes minimal caffeine, and exercises regularly, primarily through walking. He attends [employer] twice weekly for social engagement.    He fractured his left femur in 05/2024 and his right femur in 12/2024, along with an arm injury. He was under the care of an orthopedic team a few weeks post his last visit here, who cleared him of any restrictions following an x-ray examination. His pain levels are manageable, and he reports no significant discomfort.    He has been experiencing vision loss in his left eye, which has worsened recently despite corrective measures. He frequently complains of poor vision, often colliding with walls and wandering around the house at night, turning on all the lights. He has not consulted his ophthalmologist in the past two weeks.    He is currently not on any medications, except for a  "multivitamin and an eye supplement. He occasionally takes atorvastatin 20 mg. His cardiologist advised that metoprolol is not necessary if his blood pressure remains around 120, which it typically does.    SOCIAL HISTORY  He is a non-smoker and reports no illicit drug use. He has reduced his alcohol consumption significantly.      PMHx, FHx, Social Hx, Surg Hx personally reviewed at this appointment. No pertinent findings and/or changes from prior (if applicable).    ROS: Denies wt gain/loss f/c HA LoC CP SOB NVDC. See HPI above, and scanned sheet (if applicable). All other systems are reviewed and are without complaint.       Objective     /73   Pulse 77   Temp 36.2 °C (97.1 °F)   Ht 1.676 m (5' 6\")   Wt 58.5 kg (129 lb)   SpO2 98%   BMI 20.82 kg/m²      Physical Exam  Gen: elderly appearance. AAO x   HEENT: NC/AT. Anicteric sclera, symmetric pupils.   Neck: Soft, supple. No LAD. No goiter.   CV: RRR nl s1s2 no m/r/g  Pulm: CTAB no w/r/r, good air exchange  GI: soft NTND BS+ no hsm  Ext: WWP no edema  Neuro: II-XII grossly intact, nonfocal systemic findings  MSK: 5/5 strength b/l UE and LE  Gait: slow with walker        Lab Results   Component Value Date    WBC 7.1 02/27/2025    HGB 11.6 (L) 02/27/2025    HCT 36.1 (L) 02/27/2025     02/27/2025    CHOL 197 05/20/2024    TRIG 109 05/20/2024    HDL 41.5 05/20/2024    ALT 11 02/27/2025    AST 12 02/27/2025     02/27/2025    K 5.4 (H) 02/27/2025     02/27/2025    CREATININE 1.65 (H) 02/27/2025    BUN 31 (H) 02/27/2025    CO2 26 02/27/2025    INR 1.0 05/07/2024    HGBA1C 5.5 12/05/2024     par     XR hip right with pelvis when performed 2 or 3 views  Narrative: Interpreted By:  Austin Reina,   STUDY:  XR HIP RIGHT WITH PELVIS WHEN PERFORMED 2 OR 3 VIEWS; ;  4/2/2025  1:22 pm      INDICATION:  Signs/Symptoms:F/U Right Hip.      ,M97.01XA Periprosthetic fracture around internal prosthetic right  hip joint, initial encounter    "   COMPARISON:  02/19/2025.      ACCESSION NUMBER(S):  [id number]      ORDERING CLINICIAN:  SHAHZAD OMER      FINDINGS:  Right hip, three views      There is a nondisplaced fracture through the right greater  trochanter. Total hip arthroplasty present bilaterally. No  periprosthetic lucency seen      Impression: Nondisplaced periprosthetic fracture about the right greater  trochanter. This is unchanged from the prior          MACRO:  None      Signed by: Austin Reina 4/3/2025 6:33 PM  Dictation workstation:   [id number]        Current Outpatient Medications   Medication Instructions    acetaminophen (TYLENOL) 1,000 mg, 3 times daily    atorvastatin (LIPITOR) 20 mg, oral, Daily    famciclovir (FAMVIR) 125 mg, Daily    hypromellose (V[place]onio) 2.5 % ophthalmic solution 1 drop, 4 times daily PRN    metoprolol succinate XL (TOPROL-XL) 25 mg, oral, Daily, Do not crush or chew. nHold for SBP <110 or HR <60    multivitamin tablet 1 tablet, Daily          Assessment & Plan  1. Medicare wellness visit.  - Blood pressure readings are within acceptable limits.  - Extensive blood work conducted in 02/2025 revealed stable results. At his current age of 80, colon cancer screening is no longer necessary.  - Immunization status is largely up-to-date, with the exception of the RSV vaccine.  - Advised to utilize walker consistently for mobility support.  - Emphasized the importance of maintaining social engagement.  - No additional blood work will be ordered at this time.  - Advised to receive the RSV vaccine at earliest convenience from local pharmacy.    2. Fall risk: Chronic.  - Experienced multiple falls in the past year, resulting in fractures to both femurs and a shoulder injury.  - Advised to use walker consistently to prevent future falls.  - Recommended exercises to strengthen core muscles and legs.    3. Vision loss.  - Reports recent worsening of vision in left eye despite previous correction.  - Advised to  follow up with eye doctor for further evaluation and management.    4. Medication management.  - Currently taking atorvastatin 20 mg intermittently.  - Prescription for atorvastatin 20 mg will be renewed and sent to pharmacy.  - Advised to take medication in the evening for better efficacy.    Follow-up  - Follow up in 6 months.    Assessment & Plan  Hypercholesterolemia    Orders:    atorvastatin (Lipitor) 20 mg tablet; Take 1 tablet (20 mg) by mouth once daily.    Benign essential hypertension         High cholesterol         Prediabetes    Orders:    Follow Up In [facility] - PCP - Established; Future    Periprosthetic fracture of hip, subsequent encounter         Hyponatremia         Age-related osteoporosis with current pathological fracture with routine healing, subsequent encounter         Medicare annual wellness visit, subsequent         Healthcare maintenance    Orders:    Follow Up In [facility] - PCP - Established; Future         Pavel Irizarry MD            This medical note was created with the assistance of artificial intelligence (AI) for documentation purposes. The content has been reviewed and confirmed by the healthcare provider for accuracy and completeness. Patient consented to the use of audio recording and use of AI during their visit.

## 2025-07-02 ENCOUNTER — TREATMENT (OUTPATIENT)
Dept: PHYSICAL THERAPY | Facility: CLINIC | Age: 80
End: 2025-07-02
Payer: MEDICARE

## 2025-07-02 DIAGNOSIS — M25.611 STIFFNESS OF RIGHT SHOULDER JOINT: ICD-10-CM

## 2025-07-02 DIAGNOSIS — M25.551 RIGHT HIP PAIN: ICD-10-CM

## 2025-07-02 DIAGNOSIS — M25.511 ACUTE PAIN OF RIGHT SHOULDER: ICD-10-CM

## 2025-07-02 DIAGNOSIS — R26.2 DIFFICULTY WALKING: ICD-10-CM

## 2025-07-02 DIAGNOSIS — R26.89 BALANCE DISORDER: ICD-10-CM

## 2025-07-02 PROCEDURE — 97110 THERAPEUTIC EXERCISES: CPT | Mod: GP

## 2025-07-02 NOTE — PROGRESS NOTES
"Physical Therapy  Physical Therapy Treatment Note    Patient Name: Raúl Garcia  MRN: [id number]  Today's Date: 7/2/2025  Time Calculation  Start Time: 1030  Stop Time: 1115  Time Calculation (min): 45 min  PT Therapeutic Procedures Time Entry  Therapeutic Exercise Time Entry: 45        Insurance:  Visit number: 1 of 5 (previously completed 5 visits)  Authorization info: 5 visits approved 5/29/25 - 7/27/25  Insurance Type: Payor: [employer] MEDICARE / Plan: [employer] MEDICARE ADVANTAGE / Product Type: *No Product type* /   Current Problem  1. Balance disorder        2. Difficulty walking  Follow Up In Physical Therapy      3. Right hip pain  Follow Up In Physical Therapy      4. Stiffness of right shoulder joint  Follow Up In Physical Therapy      5. Acute pain of right shoulder  Follow Up In Physical Therapy        General     Precautions  Left eye vision impairments  Falls risk    Subjective:   Reports everything is getting a little bit better. Can reach behind his head easier. No recent falls. Denies pain today.    Pain  0/10    Objective:   Palpation/Observation   Pt is currently ambulating with wheeled walker and step through gait pattern.      Shoulder ROM   Flexion- R: 90 with pain L: 140  Abduction- R: 90 with pain L: 140  ER - R: 15 L: 20  IR - R: 35 L: 45     Hip ROM   Flexion- R: 110 L: 110  Abduction- R: 35 L: 30  ER - R: 30 L: 30  IR - R: 20 L: 20     Shoulder MMT  Flexion- R: 4-/5 L:  4/5  Abduction- R: 4-/5 L: 4/5  ER - R: 4-/5 L: 4/5  IR - R: 4/5 L: 4/5     Hip MMT  Flexion- R: 4/5 L: 4/5  Abduction- R: 4/5 L: 4/5   Adduction - R:4/5 L: 4/5  Extension - R:4/5 L: 4/5     Outcome Measures:  Other Measures  Disability of Arm Shoulder Hand (DASH): 18/55  Lower Extremity Funtional Score (LEFS): 50/80    Treatments:   THERE EX  5 min recumbent bike  Standing march with UE 2 x 10  NBOS unsupported, firm 30\" x 2  Sidestep with UE x 1 min  Seated wand flexion 2 x 10  Seated wand abd 2 x 10  Seated banded rows yellow " 2 x 10   Seated clamshell green 2 x 10  Seated LAQ (2#) 2 x 10   Seated hamstring curl (green) 3 x 10  Seated adduction 2 x 10       Assessment:  Verbal and tactile cues needed with most exercises. Pt felt challenged with new exercises. Some shoulder discomfort with wand exercises, but this improved with rest. Pt continues to progress towards goals established in the POC and should continue with skilled therapy.     Plan: Continue to progress hip and shoulder ROM and strengthening      Goals:  Active       PT Problem       PT Goals (Progressing)       Start:  03/24/25       1. Pt will decrease QuickDASH to 20/55 or better in order to demonstrate an increase in UE function   2. Pt will increase his R shoulder flexion, abduction, and ER by a minimum of 10 degrees in order to demonstrate an increase in UE ROM.   3. Pt will increase all UE MMT to 4+/5 or better in order to demonstrate an increase in functional strength  4. Pt will be able to place items in high cabinet with no increase in pain or discomfort in order to demonstrate an increase in functional mobility.   5. Pt will increase LEFS to 60/80 in order to demo an increase in LE function  6. Pt will be able to walk without assistive device for 10 mins with no increase in hip pain or loss of balance

## 2025-07-09 ENCOUNTER — TREATMENT (OUTPATIENT)
Dept: PHYSICAL THERAPY | Facility: CLINIC | Age: 80
End: 2025-07-09
Payer: MEDICARE

## 2025-07-09 DIAGNOSIS — M25.551 RIGHT HIP PAIN: ICD-10-CM

## 2025-07-09 DIAGNOSIS — M25.511 ACUTE PAIN OF RIGHT SHOULDER: ICD-10-CM

## 2025-07-09 DIAGNOSIS — M25.611 STIFFNESS OF RIGHT SHOULDER JOINT: ICD-10-CM

## 2025-07-09 DIAGNOSIS — R26.89 BALANCE DISORDER: ICD-10-CM

## 2025-07-09 DIAGNOSIS — R26.2 DIFFICULTY WALKING: ICD-10-CM

## 2025-07-09 PROCEDURE — 97110 THERAPEUTIC EXERCISES: CPT | Mod: GP

## 2025-07-09 NOTE — PROGRESS NOTES
Physical Therapy  Physical Therapy Treatment Note    Patient Name: Raúl Garcia  MRN: [id number]  Today's Date: 7/9/2025  Time Calculation  Start Time: 1030  Stop Time: 1115  Time Calculation (min): 45 min  PT Therapeutic Procedures Time Entry  Therapeutic Exercise Time Entry: 40        Insurance:  Visit number: 2 of 5 (previously completed 5 visits)  Authorization info: 5 visits approved 5/29/25 - 7/27/25  Insurance Type: Payor: [employer]ICARE / Plan: [employer] MEDICARE ADVANTAGE / Product Type: *No Product type* /   Current Problem  1. Balance disorder        2. Difficulty walking  Follow Up In Physical Therapy      3. Right hip pain  Follow Up In Physical Therapy      4. Stiffness of right shoulder joint  Follow Up In Physical Therapy      5. Acute pain of right shoulder  Follow Up In Physical Therapy        General  Reason for Referral: R humerus and R hip fx  Referred By: Dr. Ankush MD  Precautions  Left eye vision impairments  Falls risk    Subjective:   Reports that he continues to be pretty much pain free. He is still using his walker for safety due to his vision loss.     Pain  0/10    Objective:   Palpation/Observation   Pt is currently ambulating with wheeled walker and step through gait pattern.      Shoulder ROM   Flexion- R: 90 with pain L: 140  Abduction- R: 90 with pain L: 140  ER - R: 15 L: 20  IR - R: 35 L: 45     Hip ROM   Flexion- R: 110 L: 110  Abduction- R: 35 L: 30  ER - R: 30 L: 30  IR - R: 20 L: 20     Shoulder MMT  Flexion- R: 4-/5 L:  4/5  Abduction- R: 4-/5 L: 4/5  ER - R: 4-/5 L: 4/5  IR - R: 4/5 L: 4/5     Hip MMT  Flexion- R: 4/5 L: 4/5  Abduction- R: 4/5 L: 4/5   Adduction - R:4/5 L: 4/5  Extension - R:4/5 L: 4/5     Outcome Measures:  Other Measures  Disability of Arm Shoulder Hand (DASH): 18/55  Lower Extremity Funtional Score (LEFS): 50/80    Treatments:   THERE EX  5 min recumbent bike  Seated adduction 3 x 10   Seated hip abduction 3 x 10 (green)   Seated hip flexion 1 x 10  "(discontinued due to increased pain)   Seated rows 3 x 10 (light)   Sit to stands 3 x 5   4\" step up 3 x 10         Assessment:  Pt tolerated session well. Pt was able to continue to work on hip and UE strengthening without any increase in discomfort. Pt continued to need some verbal cueing to maintain proper form. Pt continues to progress towards goals established in the POC and should continue with skilled therapy.       Plan: Continue to progress hip and shoulder ROM and strengthening      Goals:  Active       PT Problem       PT Goals (Progressing)       Start:  03/24/25       1. Pt will decrease QuickDASH to 20/55 or better in order to demonstrate an increase in UE function   2. Pt will increase his R shoulder flexion, abduction, and ER by a minimum of 10 degrees in order to demonstrate an increase in UE ROM.   3. Pt will increase all UE MMT to 4+/5 or better in order to demonstrate an increase in functional strength  4. Pt will be able to place items in high cabinet with no increase in pain or discomfort in order to demonstrate an increase in functional mobility.   5. Pt will increase LEFS to 60/80 in order to demo an increase in LE function  6. Pt will be able to walk without assistive device for 10 mins with no increase in hip pain or loss of balance               "

## 2025-07-16 ENCOUNTER — TREATMENT (OUTPATIENT)
Dept: PHYSICAL THERAPY | Facility: CLINIC | Age: 80
End: 2025-07-16
Payer: MEDICARE

## 2025-07-16 DIAGNOSIS — M25.611 STIFFNESS OF RIGHT SHOULDER JOINT: ICD-10-CM

## 2025-07-16 DIAGNOSIS — M25.511 ACUTE PAIN OF RIGHT SHOULDER: ICD-10-CM

## 2025-07-16 DIAGNOSIS — R26.89 BALANCE DISORDER: ICD-10-CM

## 2025-07-16 DIAGNOSIS — M25.551 RIGHT HIP PAIN: ICD-10-CM

## 2025-07-16 DIAGNOSIS — R26.2 DIFFICULTY WALKING: ICD-10-CM

## 2025-07-16 PROCEDURE — 97110 THERAPEUTIC EXERCISES: CPT | Mod: GP

## 2025-07-16 NOTE — PROGRESS NOTES
Physical Therapy  Physical Therapy Treatment Note    Patient Name: Raúl Garcia  MRN: [id number]  Today's Date: 7/16/2025  Time Calculation  Start Time: 1100  Stop Time: 1145  Time Calculation (min): 45 min  PT Therapeutic Procedures Time Entry  Therapeutic Exercise Time Entry: 40        Insurance:  Visit number: 3 of 5 (previously completed 5 visits)  Authorization info: 5 visits approved 5/29/25 - 7/27/25  Insurance Type: Payor: [employer] MEDICARE / Plan: [employer] MEDICARE ADVANTAGE / Product Type: *No Product type* /   Current Problem  1. Balance disorder        2. Difficulty walking  Follow Up In Physical Therapy      3. Right hip pain  Follow Up In Physical Therapy      4. Stiffness of right shoulder joint  Follow Up In Physical Therapy      5. Acute pain of right shoulder  Follow Up In Physical Therapy        General     Precautions  Left eye vision impairments  Falls risk    Subjective:   Reports his shoulder has been bothering him more than his hip.     Pain  0/10    Objective:   Palpation/Observation   Pt is currently ambulating with wheeled walker and step through gait pattern.      Shoulder ROM   Flexion- R: 90 with pain L: 140  Abduction- R: 90 with pain L: 140  ER - R: 15 L: 20  IR - R: 35 L: 45     Hip ROM   Flexion- R: 110 L: 110  Abduction- R: 35 L: 30  ER - R: 30 L: 30  IR - R: 20 L: 20     Shoulder MMT  Flexion- R: 4-/5 L:  4/5  Abduction- R: 4-/5 L: 4/5  ER - R: 4-/5 L: 4/5  IR - R: 4/5 L: 4/5     Hip MMT  Flexion- R: 4/5 L: 4/5  Abduction- R: 4/5 L: 4/5   Adduction - R:4/5 L: 4/5  Extension - R:4/5 L: 4/5     Outcome Measures:  Other Measures  Disability of Arm Shoulder Hand (DASH): 18/55  Lower Extremity Funtional Score (LEFS): 50/80    Treatments:   THERE EX  5 min recumbent bike  Seated adduction 3 x 10   Seated hip abduction 3 x 10 (green)   Seated rows 3 x 10 (light)   Dowel shoulder flexion 2 x 10   Banded ER (yellow) 2 x 10   Seated shoulder pullies 1 x 20       Assessment:  Pt tolerated  session well. Pt was able to focus more on his shoulder and verbalized that it felt better than beginning of session after mobility exercises were performed. Pt continues to progress towards goals established in the POC and should continue with skilled therapy.         Plan: Continue to progress hip and shoulder ROM and strengthening      Goals:  Active       PT Problem       PT Goals (Progressing)       Start:  03/24/25       1. Pt will decrease QuickDASH to 20/55 or better in order to demonstrate an increase in UE function   2. Pt will increase his R shoulder flexion, abduction, and ER by a minimum of 10 degrees in order to demonstrate an increase in UE ROM.   3. Pt will increase all UE MMT to 4+/5 or better in order to demonstrate an increase in functional strength  4. Pt will be able to place items in high cabinet with no increase in pain or discomfort in order to demonstrate an increase in functional mobility.   5. Pt will increase LEFS to 60/80 in order to demo an increase in LE function  6. Pt will be able to walk without assistive device for 10 mins with no increase in hip pain or loss of balance

## 2025-07-22 ENCOUNTER — APPOINTMENT (OUTPATIENT)
Dept: GERIATRIC MEDICINE | Facility: CLINIC | Age: 80
End: 2025-07-22
Payer: MEDICARE

## 2025-07-22 ENCOUNTER — OFFICE VISIT (OUTPATIENT)
Dept: GERIATRIC MEDICINE | Facility: CLINIC | Age: 80
End: 2025-07-22
Payer: MEDICARE

## 2025-07-22 VITALS
TEMPERATURE: 96.6 F | DIASTOLIC BLOOD PRESSURE: 67 MMHG | HEART RATE: 87 BPM | RESPIRATION RATE: 20 BRPM | SYSTOLIC BLOOD PRESSURE: 125 MMHG | BODY MASS INDEX: 20.85 KG/M2 | WEIGHT: 129.2 LBS

## 2025-07-22 DIAGNOSIS — R40.0 DAYTIME SOMNOLENCE: ICD-10-CM

## 2025-07-22 DIAGNOSIS — R44.3 HALLUCINATIONS: ICD-10-CM
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
## 2025-07-22 DIAGNOSIS — R55 SYNCOPE, UNSPECIFIED SYNCOPE TYPE: ICD-10-CM

## 2025-07-22 DIAGNOSIS — R41.3 MEMORY LOSS: Primary | ICD-10-CM

## 2025-07-22 DIAGNOSIS — M89.9 DISORDER OF BONE, UNSPECIFIED: ICD-10-CM

## 2025-07-22 DIAGNOSIS — Z91.89 DRIVING SAFETY ISSUE: ICD-10-CM

## 2025-07-22 DIAGNOSIS — F10.90 ALCOHOL USE: ICD-10-CM

## 2025-07-22 PROCEDURE — G2211 COMPLEX E/M VISIT ADD ON: HCPCS | Performed by: STUDENT IN AN ORGANIZED HEALTH CARE EDUCATION/TRAINING PROGRAM

## 2025-07-22 PROCEDURE — 99215 OFFICE O/P EST HI 40 MIN: CPT | Performed by: STUDENT IN AN ORGANIZED HEALTH CARE EDUCATION/TRAINING PROGRAM

## 2025-07-22 PROCEDURE — 1159F MED LIST DOCD IN RCRD: CPT | Performed by: STUDENT IN AN ORGANIZED HEALTH CARE EDUCATION/TRAINING PROGRAM

## 2025-07-22 PROCEDURE — 3074F SYST BP LT 130 MM HG: CPT | Performed by: STUDENT IN AN ORGANIZED HEALTH CARE EDUCATION/TRAINING PROGRAM

## 2025-07-22 PROCEDURE — 1170F FXNL STATUS ASSESSED: CPT | Performed by: STUDENT IN AN ORGANIZED HEALTH CARE EDUCATION/TRAINING PROGRAM

## 2025-07-22 PROCEDURE — 99215 OFFICE O/P EST HI 40 MIN: CPT | Mod: 25 | Performed by: STUDENT IN AN ORGANIZED HEALTH CARE EDUCATION/TRAINING PROGRAM

## 2025-07-22 PROCEDURE — 1157F ADVNC CARE PLAN IN RCRD: CPT | Performed by: STUDENT IN AN ORGANIZED HEALTH CARE EDUCATION/TRAINING PROGRAM

## 2025-07-22 PROCEDURE — 1126F AMNT PAIN NOTED NONE PRSNT: CPT | Performed by: STUDENT IN AN ORGANIZED HEALTH CARE EDUCATION/TRAINING PROGRAM

## 2025-07-22 PROCEDURE — 3078F DIAST BP <80 MM HG: CPT | Performed by: STUDENT IN AN ORGANIZED HEALTH CARE EDUCATION/TRAINING PROGRAM

## 2025-07-22 PROCEDURE — 36415 COLL VENOUS BLD VENIPUNCTURE: CPT | Performed by: STUDENT IN AN ORGANIZED HEALTH CARE EDUCATION/TRAINING PROGRAM

## 2025-07-22 ASSESSMENT — GERIATRIC DEPRESSION SCALE SHORT VERSION (GDS-SV)
ARE YOU BASICALLY SATISFIED WITH YOUR LIFE: YES
DO YOU THINK THAT MOST PEOPLE ARE BETTER OFF THAN YOU ARE: NO
DO YOU FEEL HAPPY MOST OF THE TIME: YES
HAVE YOU DROPPED MANY OF YOUR ACTIVITIES AND INTERESTS?: NO
DO YOU FEEL PRETTY WORTHLESS THE WAY YOU ARE NOW: NO
DO YOU OFTEN GET BORED: NO
ARE YOU AFRAID THAT SOMETHING BAD IS GOING TO HAPPEN TO YOU: NO
DO YOU FEEL YOU HAVE MORE PROBLEMS WITH MEMORY THAN MOST: NO
DO YOU FEEL THAT YOUR SITUATION IS HOPELESS: NO
DO YOU PREFER TO STAY AT HOME, RATHER THAN GOING OUT AND DOING NEW THINGS: NO
DO YOU FEEL THAT YOUR LIFE IS EMPTY: NO
ARE YOU IN GOOD SPIRITS MOST OF THE TIME: YES
GDS TOTAL SCORE: 1
DO YOU FEEL FULL OF ENERGY: YES
DO YOU OFTEN FEEL HELPLESS: YES
DO YOU THINK IT IS WONDERFUL TO BE ALIVE NOW: YES

## 2025-07-22 ASSESSMENT — ENCOUNTER SYMPTOMS
LOSS OF SENSATION IN FEET: 0
DEPRESSION: 0
OCCASIONAL FEELINGS OF UNSTEADINESS: 1

## 2025-07-22 ASSESSMENT — ACTIVITIES OF DAILY LIVING (ADL)
ADEQUATE_TO_COMPLETE_ADL: YES
HEARING - RIGHT EAR: FUNCTIONAL
USING_TRANSPORTATION: TOTAL CARE
PATIENT'S MEMORY ADEQUATE TO SAFELY COMPLETE DAILY ACTIVITIES?: YES
JUDGMENT_ADEQUATE_SAFELY_COMPLETE_DAILY_ACTIVITIES: YES
HEARING - LEFT EAR: FUNCTIONAL
STILL_DRIVING: NO
BATHING: INDEPENDENT
TOILETING: INDEPENDENT
DOING_HOUSEWORK: TOTAL CARE
PILL_BOX_USED: NO
FEEDING YOURSELF: INDEPENDENT
MANAGING_FINANCES: TOTAL CARE
EATING: INDEPENDENT
DRESSING YOURSELF: INDEPENDENT
NEEDS_ASSISTANCE_WITH_FOOD: INDEPENDENT
GROCERY_SHOPPING: TOTAL CARE
USING_TELEPHONE: NEEDS ASSISTANCE
WALKS IN HOME: INDEPENDENT
PREPARING_MEALS: TOTAL CARE
GROOMING: INDEPENDENT
TAKING_MEDICATION: NEEDS ASSISTANCE

## 2025-07-22 ASSESSMENT — MONTREAL COGNITIVE ASSESSMENT (MOCA)
WHAT LEVEL OF EDUCATION WAS ATTAINED: 0
7. [VIGILENCE] TAP WHEN HEARING DESIGNATED LETTER: 1
9. REPEAT EACH SENTENCE: 2
6. READ LIST OF DIGITS [FORWARD/BACKWARD]: 1
12. MEMORY INDEX SCORE: 0
10. [FLUENCY] NAME WORDS STARTING WITH DESIGNATED LETTER: 0
11. FOR EACH PAIR OF WORDS, WHAT CATEGORY DO THEY BELONG TO (OUT OF 2): 2
4. NAME EACH OF THE THREE ANIMALS SHOWN: 2
WHAT IS THE TOTAL SCORE (OUT OF 30): 13
VISUOSPATIAL/EXECUTIVE SUBSCORE: 1
8. SERIAL SUBTRACTION OF 7S: 3
5. MEMORY TRIALS: 0
13. ORIENTATION SUBSCORE: 1

## 2025-07-22 ASSESSMENT — PATIENT HEALTH QUESTIONNAIRE - PHQ9
2. FEELING DOWN, DEPRESSED OR HOPELESS: NOT AT ALL
SUM OF ALL RESPONSES TO PHQ9 QUESTIONS 1 AND 2: 0
1. LITTLE INTEREST OR PLEASURE IN DOING THINGS: NOT AT ALL

## 2025-07-22 ASSESSMENT — PAIN SCALES - GENERAL: PAINLEVEL_OUTOF10: 0-NO PAIN

## 2025-07-22 NOTE — PROGRESS NOTES
"Patient ID: Raúl Garcia is a 80 y.o. male who presents for establishment of care.    Identifying Information                              : 1945      Assessment date: 2025    Patient accompanied by: Anan Alicea wife   History provided by: wife and patient    Symptoms Reported (include length of time): Pt had 2 falls, very confused and forgetful. STM is worsening. Memory problem started prior to the second fall, but after the 2nd fall pt went to [facility] for 2 weeks. While there, pt started hallucinating and telling \"far fetching stories\". Then pt went home on 2025, was questioning wife who lived in the home and how can they afford to live there. Called his wife Valeriano who is his brother's name. Did get better after returning home, but was never the same.     CONCERNS IDENTIFIED BY CLINICAL SUPPORT STAFF (Safety Risks, Health Issues, POA not in Chart, etc)     1. May 4, 2024 pt fell and fractured left femur did not have surgery. Dec 4, 2024 fell and fractured right femur and right shoulder did not have surgery. First fall, pt was walking down the steps of the patio and fell. Second fall, pt was getting up from the table at a a restaurant, then a crowd of people walked past to sit at the table next to pt and he fell.  told wife that pt hit his head, but no tests were done.     2. Gets up twice a night mostly to use the bathroom, but will walk through the house. Pt was getting up 3-4 times a night and walking throughout the house and turn the lights on and off before for a couple months, has since slowed down.     3. Has an ERS button, but it is not hooked up yet. Encouraged wife to set it up, stated \"okay\".                  Social History    Level of Education: post college graduate work or degree  Occupation/Work Status:       senior living date (if applicable): 2856-8513     On any form of disability? no If so, explain:   Marital Status:                     Social " History[1]      ENCOUNTER SCREENING RESULTS  MOCA Total Score: 13       Geriatric Depression Scale (Short Version) Total: 1    Advance Directives/Legal     Person(s) Named on Lr Consent to Release: Wife Anna Alicea     Patient Healthcare POA: Wife Anna Alicea - Staff provided [facility] Advanced Directive packet and advised on importance of document and process for completing.         Is Healthcare POA currently scanned into patient's chart: Yes Copies of advanced directives were provided at this appointment and will be scanned into chart.     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, left eye has limited vision)  Patient's Judgment Adequate to Safely Complete Daily Activities: Yes  Patient's Memory Adequate to Safely Complete Daily Activities: Yes  Patient Able to Express Needs/Desires: Yes  Which is your dominant hand?: Right  Dressing: Independent  Grooming: Independent  Feeding: Independent  Bathing: Independent  Toileting: Independent  In/Out Bed: Independent  Walks in Home: Independent  Weakness of Legs: Both (hx of left and right femur fracture from falls, neuropathy in feet, plates and screws in left leg)  Weakness of Arms/Hands: Right (right shoulder fracture from a fall)  Hearing - Right Ear: Functional  Hearing - Left Ear: Functional     IADL's  Using Telephone: Needs assistance (wife assists with using the phone)  Grocery Shopping: Total care (wife)  Preparing Meals: Total care  Doing Housework: Total care  Laundry: Total  Taking Medication: Needs assistance (wife)  Pill Box Used: No  Managing Finances: Total care (wife)  Using Transportation: Total care  Still Driving: No  Eating: Independent  Needs Assistance With Food: Independent  Difficulty Chewing or Swallowing: No     Nutrition and Exercise  Current Diet: Well Balanced Diet  Adequate Fluid Intake: Yes  (water)  Caffeine: No  Appetite:: Good  Food Consistency:: Regular  Liquids Consistency:: Thin  Changes in Weight?: No  Chewing or Swallowing Problems?: No  Exercise Frequency: Regularly (PT once a week, walks around the house)    Safety Concerns  Safety Concerns: None  Safety Concerns Notes:: basement stairs are open, theres no doorway leading to the basement, has to put 2 big boxes to block the stairway.    Family History      Biological Mother(status, age at death, cause of death): Mom  in her 80's from sepsis.      Biological Father(status, age at death, cause of death): Dad  at 79 from strokes and other health problems, not cognitive impairment     Biological Siblings(status, age at death, cause of death): 2 brothers, pt is the oldest. Both brothers are alive and healthy      Extended family members with relevant health history:     Supportive Relationships (Informal Support)     Spouse/partner information (include length of relationship, health status):  26 years     Children Information (include location, level of engagement): none      Other social supports:    Formal Supports     Engaged community services (Emergency Alert, HHA, MOW, Case Management, Etc.): none    Mental Health     Sleep: Goes to bed at 12am, gets up twice a night mostly to use the bathroom, but will walk through the house. Was getting up 3-4 times a night and walking throughout the house and turn the lights on and off before for a couple months, has since slowed down. Gets up for the day 10am.      Energy: Adequate energy      Mood concerns noted by patient/family: Mood is stable     Relevant mental health history: No     Self-harm/suicidal thoughts, plan: None Reported     Interests/Hobbies/Activities/Daily Routine: plays golf, reads the paper daily      Alcohol, illicit drug, and tobacco use reported by patient/family: beer occasionally now, prior to , drank 2-3 drinks a night    Additional Notes or Follow-up  Matters:          [1]   Social History  Socioeconomic History    Marital status:    Tobacco Use    Smoking status: Never     Passive exposure: Never    Smokeless tobacco: Never   Vaping Use    Vaping status: Never Used   Substance and Sexual Activity    Alcohol use: Yes     Alcohol/week: 8.0 standard drinks of alcohol     Types: 8 Standard drinks or equivalent per week     Comment: Occasionally    Drug use: Never    Sexual activity: Not Currently     Partners: Female     Birth control/protection: Male Sterilization     Social Drivers of Health     Financial Resource Strain: Low Risk  (5/8/2024)    Overall Financial Resource Strain (CARDIA)     Difficulty of Paying Living Expenses: Not very hard   Food Insecurity: No Food Insecurity (12/5/2024)    Received from [facility]    Hunger Vital Sign     Within the past 12 months, you worried that your food would run out before you got the money to buy more.: Never true     Within the past 12 months, the food you bought just didn't last and you didn't have money to get more.: Never true   Transportation Needs: No Transportation Needs (12/5/2024)    Received from [facility]    PRAPARE - Transportation     Lack of Transportation (Medical): No     Lack of Transportation (Non-Medical): No   Housing Stability: Low Risk  (12/5/2024)    Received from [facility]    Housing Stability Vital Sign     In the last 12 months, was there a time when you were not able to pay the mortgage or rent on time?: No     In the past 12 months, how many times have you moved where you were living?: 1     At any time in the past 12 months, were you homeless or living in a shelter (including now)?: No

## 2025-07-22 NOTE — PROGRESS NOTES
"Subjective   Mr. Garcia 80 y.o. year old male is accompanied by: Anna Alicea - Wife.  Consult Requested By: patient's primary care physician, Pavel Irizarry MD   Reason for consultation or primary concern: New Patient Visit    HPI   Patient thought that his memory loss was part of aging. Patient has been forgetting people's names.  He has been asking the same questions over and over again.  Wife started to get really concerned, when he would go to the kitchen, or bathroom and would not have a clue when he went there.  Patient has been making up stories at times.  Patient has always dreamt a lot - initially wife thought he was dreaming.  Patient was then confused about how they were living in their house.  He was not sure who was paying for the house. Patient has lived in this house for 18 years.  Patient has also been having difficulties with remembering doctor's appointments.  Patient has been confused with dates and times as well.  Patient last fell in December 2024 - he fractured his \"femur and shoulder\".  Patient had to go through rehab.  Patient went to [facility] for 2 weeks.  Patient was confused in and after [facility].  Patient called his wife by his brother's name.  He was worried about his finances, and thought wife was taking his money - this was in January 2025.  Patient did apologize for this after a couple of weeks.  Patient is still worried about finances.   Patient has been getting confused about dates and times.  Patient has friends - woman passed away 3 weeks ago - patient confused who passed away.  Patient has been having more difficulties with his short term memory.  Patient was having some hallucinations.  He was wondering the name of a cat in the corner of the room.  He has also had vivid dreams.  No loss of smell.  No tremors.  Patient has fallen 2x in the last year - May 2024 and December 2024.  No changes in personality.  Wife first noticed memory loss approximately 9 months ago.  " Patient initially forgot names and dates, but things have been worsening.  Patient has also been fixating on things.  Sleeping well.  Appetite is good per wife.      Patient used to drink 2-3 drinks of alcohol every night - shanika or martini.  Patient had been having 2-3 drinks per day entire life.  Wife thought drinks were stiff drinks.     Patient was worried about getting maps of the neighborhood that he has lived in for the last 18 years because he was worried about getting lost.  He has not left the stove on.      Wife thought that patient had a noticeable difference in February - that occurred all of a sudden, and symptoms have persisted since then.     Patient has passed out while playing golf - he was hot at the time, and wife found him to be slumped over.  Wife thought it was heat exhaustion - she dumped a bottle of water on his head and he became more awake.  This occurred approx. 1 week ago.     Patient snores at night - wife thinks he has been taking multiple naps during the day.  Patient dozes off 4-5 times a day.  Patient has to take a full nap, where he has to go lay down - 3x a week.      Short-term memory loss history:   Duration: 9 months.  Progression: slowly progressive  Timing: cognition worse at night  Associated symptoms:  Changes in mood/behavior: visual hallucinations - as above.    Changes in sleeping pattern: No  Wandering: Yes - once during the winter, patient opened up the door to the patio - the chair kept him from wandering off, because chair made it difficult for him to walk out with his walker.    Paranoia: No  REM-Sleep disorder symptoms: Yes - patient talks a lot during his sleep.    Driving safety issues: No - stopped driving in December after his fall.    Medication safety issues: No -  wife helps him with medications.    At risk for being scammed: No    Examples of memory loss:  As above    History of:  Stroke: No  Head trauma: Yes - during his falls.    Seizures: No  Toxin  exposures: No  Delirium: Yes - as above.  Severe COVID-19: No  Cancer: No  Psychiatric diseases: No  Psychiatric hospitalization: No  MARION: No Using CPAP?: N/A  Hearing loss: no  Hearing aids: no    History from patient:  ***    Knows 911: yes  Current President: Pretty  Upcoming holiday: Labor day  Current news: Tariffs  What matters most: family.    Health Goals:    Goals    None         Social history:   Alcohol - yes - as above. Last drink was memorial day.       Living environment: patient lives in a dual story house.  He has a bedroom and bathroom on the first floor.      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    Hearing: hearing No Last exam: wife declined.  Dental: dentures No Last exam: 2 years ago.    Advanced Directives on file: <no information>      Medications reviewed and reconciled.     Objective   /67 (BP Location: Right arm, Patient Position: Sitting, BP Cuff Size: Adult)   Pulse 87 Comment: 100%  Temp 35.9 °C (96.6 °F) (Tympanic)   Resp 20   Wt 58.6 kg (129 lb 3.2 oz)   BMI 20.85 kg/m²       Physical Exam  Constitutional:       General: He is not in acute distress.     Appearance: He is not ill-appearing.   HENT:      Right Ear: External ear normal.      Left Ear: External ear normal.      Nose: Nose normal. No congestion or rhinorrhea.      Mouth/Throat:      Mouth: Mucous membranes are moist.     Eyes:      General:         Right eye: No discharge.         Left eye: No discharge.      Conjunctiva/sclera: Conjunctivae normal.       Cardiovascular:      Rate and Rhythm: Normal rate and regular rhythm.      Pulses: Normal pulses.      Heart sounds: Normal heart sounds. No murmur heard.     No  "friction rub. No gallop.   Pulmonary:      Effort: Pulmonary effort is normal. No respiratory distress.      Breath sounds: Normal breath sounds. No stridor. No wheezing, rhonchi or rales.   Abdominal:      General: Abdomen is flat. There is no distension.      Palpations: There is no mass.      Tenderness: There is no abdominal tenderness. There is no guarding or rebound.     Musculoskeletal:         General: No tenderness.      Right lower leg: No edema.      Left lower leg: No edema.     Skin:     Coloration: Skin is not jaundiced or pale.      Findings: No rash.     Neurological:      Comments: Decreased vision left eye - chronic.   Psychiatric:         Mood and Affect: Mood normal.         Behavior: Behavior normal.             MoCA: 13/30  CDT:    /5  PHQ9:   /27    No results found for: \"TSH\"  No results found for: \"FREET4\"  Lab Results   Component Value Date    YSGBVPTX99 470 05/12/2021     Lab Results   Component Value Date    HGBA1C 5.5 12/05/2024    HGBA1C 5.7 (A) 05/16/2022    HGBA1C 6.2 05/12/2021     No results found for: \"VITD25\"     No results found for this or any previous visit from the past 365 days.     CT head wo IV contrast 12/06/2024    Narrative  * * *Final Report* * *    DATE OF EXAM: Dec  6 2024  6:17AM    [facility]   0504  -  CT BRAIN WO IVCON   / ACCESSION #  [id number]    PROCEDURE REASON: Mental status change, unknown cause    * * * * Physician Interpretation * * * *    RESULT: EXAMINATION: CT BRAIN WO IVCON, CT CERVICAL SPINE WO IVCON    CLINICAL HISTORY: Neck trauma.  Mental status change, unknown cause    TECHNIQUE:  Serial axial images without IV contrast were obtained from  the vertex to the foramen magnum. Axial images were obtained of the  cervical spine without intravenous contrast. Reformatted sagittal and  coronal images were provided for review.      CT Radiation dose: Integrated Dose-Length Product (DLP) for this visit =  1189  mGy*cm  CT Dose Reduction Employed: Automated exposure " control(AEC) and iterative  recon    COMPARISON: CT brain 06/11/2024.    RESULT:     (topogram) images: No additional findings.    CT head:    Evaluation is partially obscured with only axial images provided.  There  is additional mild motion artifact.    Stable confluent areas of subcortical and periventricular white matter  low attenuation are seen which are nonspecific but consistent with  ischemic change from severe small vessel disease.  There is no evidence  of a mass lesion, hemorrhage, acute infarction or midline shift.  The  gray-white matter differentiation is preserved.  There is stable  prominence of the ventricles and cerebral sulci in keeping with mild  cerebral atrophy.  No extra-axial fluid collections are present.  The  basal cisterns are patent.    The calvarium is intact.  Complete opacification of the included right  maxillary sinus and patchy opacification of the bilateral ethmoid air  cells is nonspecific but stable.  The mastoid air cells are clear.    Reformatted images support the above findings.    CT cervical spine:    No acute fracture is identified. Normal alignment without subluxation.  Normal alignment of facet joints.    The prevertebral soft tissues are not thickened. Predental space is not  widened. The relationship of the dens and atlas is normal.  Mild to  moderate degenerative changes between the anterior to C1 and the dens.    Moderate degenerative projecting endplate osteophytes, some of which are  bridging, in the mid to lower cervical spine.  Mild disc height loss  throughout the cervical spine.  Moderate to severe facet arthropathy at  C4-5 and C5-6 on the right.  Mild facet arthropathy scattered elsewhere  in the cervical and included upper thoracic spine.    Assessment of the soft tissues of the neck shows no obvious localizing  soft tissue edema for hematoma formation. No worrisome lymphadenopathy.    Mild to moderate degenerative changes of the left glenohumeral  joint with  decrease in the acromiohumeral distance from rotator cuff tendon tear,  likely chronic.    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  Mild to moderate  calcification of the carotid bulbs.    Impression:    1. No acute intracranial abnormality.    2. No evidence of acute osseous abnormality of the cervical spine.    3. Mild to moderate multifactorial degenerative changes of the cervical  spine.    4. Complete opacification of the included right maxillary sinus with  patchy opacification of the bilateral ethmoid air cells is stable and  nonspecific.  Correlate for sinus disease.        Transcribed Using Voice Recognition  Transcribe Date/Time: Dec  6 2024  6:20A    Dictated by: KENYETTA SAVAGE MD    This examination was interpreted and the report reviewed and  electronically signed by:  KENYETTA SAVAGE MD on Dec  6 2024  6:37AM  EST     No results found for this or any previous visit from the past 365 days.      Raúl was seen today for new patient visit.  Diagnoses and all orders for this visit:  Memory loss (Primary)  -     Cancel: Vitamin B12; Future  -     Cancel: Tsh With Reflex To Free T4 If Abnormal; Future  -     MR brain wo IV contrast neuroquant protocol; Future  -     Cancel: Vitamin B12  -     Cancel: Tsh With Reflex To Free T4 If Abnormal  -     Vitamin B12  -     Tsh With Reflex To Free T4 If Abnormal  Hallucinations  Syncope, unspecified syncope type  Alcohol use  Daytime somnolence  -     Referral to Adult Sleep Medicine; Future  Driving safety issue  -     Referral to Occupational Therapy; Future  Fall, sequela  -     Vitamin D 25-Hydroxy,Total (for eval of Vitamin D levels)  Disorder of bone, unspecified  -     Vitamin D 25-Hydroxy,Total (for eval of Vitamin D levels)    Diagnoses and all orders for this visit:  Memory loss  -     Vitamin B12; Future  -     Tsh With Reflex To Free T4 If Abnormal; Future  -     MR brain wo IV contrast neuroquant protocol;  Future  Hallucinations  - likely related to delirium  - continue to monitor.  Syncope, unspecified syncope type / fall  - follow up with PCP and Cardiology  - check vitamin D level  Alcohol use  - start multivitamin  Daytime somnolence  -     Referral to Adult Sleep Medicine; Future  Driving safety issue  -     Referral to Occupational Therapy; Future        Leeroy Orozco MD

## 2025-07-22 NOTE — PATIENT INSTRUCTIONS
Thank you for your visit today.  Please see instructions below:  Please have B12, TSH, and Vitamin D level done  Please have MRI brain done  Please follow up with sleep medicine  Please follow up with cardiology / PCP regarding slumping over.  Please have driving evaluation done  Please start taking multivitamin  Please call with any worsening symptoms  Please follow up in 1 month for FSC.

## 2025-07-23 LAB
25(OH)D3+25(OH)D2 SERPL-MCNC: 36 NG/ML (ref 30–100)
TSH SERPL-ACNC: 1.02 MIU/L (ref 0.4–4.5)
VIT B12 SERPL-MCNC: 708 PG/ML (ref 200–1100)

## 2025-07-23 NOTE — PROGRESS NOTES
Physical Therapy  Physical Therapy Treatment Note    Patient Name: Raúl Garcia  MRN: [id number]  Today's Date: 7/24/2025  Time Calculation  Start Time: 1045  Stop Time: 1130  Time Calculation (min): 45 min  PT Therapeutic Procedures Time Entry  Therapeutic Exercise Time Entry: 40        Insurance:  Visit number: 4 of 5 (previously completed 5 visits)  Authorization info: 5 visits approved 5/29/25 - 7/27/25  Insurance Type: Payor: [employer] / Plan: [employer] MEDICARE ADVANTAGE / Product Type: *No Product type* /   Current Problem  1. Balance disorder        2. Difficulty walking  Follow Up In Physical Therapy      3. Right hip pain  Follow Up In Physical Therapy      4. Stiffness of right shoulder joint  Follow Up In Physical Therapy      5. Acute pain of right shoulder  Follow Up In Physical Therapy        General     Precautions  Left eye vision impairments  Falls risk    Subjective:   Patient's wife reports that he fell at [place].  EMS came and checked him out and let him go home.  Did not have to go to hospital.     Pain  0/10    Objective:   Palpation/Observation   Pt is currently ambulating with wheeled walker and step through gait pattern.      Shoulder ROM   Flexion- R: 90 with pain L: 140  Abduction- R: 90 with pain L: 140  ER - R: 15 L: 20  IR - R: 35 L: 45     Hip ROM   Flexion- R: 110 L: 110  Abduction- R: 35 L: 30  ER - R: 30 L: 30  IR - R: 20 L: 20     Shoulder MMT  Flexion- R: 4-/5 L:  4/5  Abduction- R: 4-/5 L: 4/5  ER - R: 4-/5 L: 4/5  IR - R: 4/5 L: 4/5     Hip MMT  Flexion- R: 4/5 L: 4/5  Abduction- R: 4/5 L: 4/5   Adduction - R:4/5 L: 4/5  Extension - R:4/5 L: 4/5     Outcome Measures:  Other Measures  Disability of Arm Shoulder Hand (DASH): 18/55  Lower Extremity Funtional Score (LEFS): 50/80    Treatments:   THERE EX  5 min recumbent bike  Seated adduction 3 x 10   Seated hip abduction 3 x 10 (green)   LAQ 2 x 10   Seated rows 3 x 10 (light)   Seated ball raise 2 x 10   Amb in clinic 2 laps  with RW Gait belt  Seated HR x20   Seated shoulder pulleys 1 x 20       Assessment:  Pt tolerated session well. Pt was able to complete a gentle session today decreased due to recent fall.   He had improved shoulder mobility with use of pulleys and discussed purchasing for home use.  Pt continues to progress towards goals established in the POC and should continue with skilled therapy.         Plan: Continue to progress hip and shoulder ROM and strengthening      Goals:  Active       PT Problem       PT Goals (Progressing)       Start:  03/24/25       1. Pt will decrease QuickDASH to 20/55 or better in order to demonstrate an increase in UE function   2. Pt will increase his R shoulder flexion, abduction, and ER by a minimum of 10 degrees in order to demonstrate an increase in UE ROM.   3. Pt will increase all UE MMT to 4+/5 or better in order to demonstrate an increase in functional strength  4. Pt will be able to place items in high cabinet with no increase in pain or discomfort in order to demonstrate an increase in functional mobility.   5. Pt will increase LEFS to 60/80 in order to demo an increase in LE function  6. Pt will be able to walk without assistive device for 10 mins with no increase in hip pain or loss of balance

## 2025-07-24 ENCOUNTER — TREATMENT (OUTPATIENT)
Dept: PHYSICAL THERAPY | Facility: CLINIC | Age: 80
End: 2025-07-24
Payer: MEDICARE

## 2025-07-24 DIAGNOSIS — M25.611 STIFFNESS OF RIGHT SHOULDER JOINT: ICD-10-CM

## 2025-07-24 DIAGNOSIS — M25.511 ACUTE PAIN OF RIGHT SHOULDER: ICD-10-CM

## 2025-07-24 DIAGNOSIS — R26.2 DIFFICULTY WALKING: ICD-10-CM

## 2025-07-24 DIAGNOSIS — M25.551 RIGHT HIP PAIN: ICD-10-CM

## 2025-07-24 DIAGNOSIS — R26.89 BALANCE DISORDER: ICD-10-CM

## 2025-07-24 PROCEDURE — 97110 THERAPEUTIC EXERCISES: CPT | Mod: GP,CQ

## 2025-08-01 ENCOUNTER — TREATMENT (OUTPATIENT)
Dept: PHYSICAL THERAPY | Facility: CLINIC | Age: 80
End: 2025-08-01
Payer: MEDICARE

## 2025-08-01 DIAGNOSIS — M25.611 STIFFNESS OF RIGHT SHOULDER JOINT: ICD-10-CM

## 2025-08-01 DIAGNOSIS — R26.2 DIFFICULTY WALKING: ICD-10-CM

## 2025-08-01 DIAGNOSIS — M25.511 ACUTE PAIN OF RIGHT SHOULDER: ICD-10-CM

## 2025-08-01 DIAGNOSIS — M25.551 RIGHT HIP PAIN: ICD-10-CM

## 2025-08-01 PROCEDURE — 97110 THERAPEUTIC EXERCISES: CPT | Mod: GP

## 2025-08-01 NOTE — PROGRESS NOTES
Physical Therapy  Physical Therapy Treatment Note    Patient Name: Raúl Garcia  MRN: [id number]  Today's Date: 8/1/2025  Time Calculation  Start Time: 1508  Stop Time: 1546  Time Calculation (min): 38 min  PT Therapeutic Procedures Time Entry  Therapeutic Exercise Time Entry: 38        Insurance:  Visit number: 5 of 5 (previously completed 5 visits)  Authorization info: 5 visits approved 5/29/25 - 7/27/25  Insurance Type: Payor: [employer] / Plan: [employer] / Product Type: *No Product type* /   Current Problem  1. Difficulty walking  Follow Up In Physical Therapy      2. Right hip pain  Follow Up In Physical Therapy      3. Stiffness of right shoulder joint  Follow Up In Physical Therapy      4. Acute pain of right shoulder  Follow Up In Physical Therapy        General     Precautions  Left eye vision impairments  Falls risk    Subjective:   Ambulating all distances with wheeled walker. Continues to note intermittent pain and cramping in his shoulders and hips. This can limit his functional mobility. Still notes some difficulty reaching into cupboards and behind his back. Can also be challenging to lift grocery bags and bend over to  objects off of the floor.     Pain  0/10 currently, but had cramping in his hips earlier today    Objective: 8/1/25  Palpation/Observation   Pt is currently ambulating with wheeled walker and step through gait pattern.      Shoulder ROM   Flexion- R: 90 with pain (102 deg PROM)  L: 140  Abduction- R: 90 with pain L: 140  ER - R: 15 L: 20  IR - R: 35 L: 45     Hip ROM   Flexion- R: 110 L: 110  Abduction- R: 35 L: 30  ER - R: 30 L: 30  IR - R: 20 L: 20     Shoulder MMT  Flexion- R: 4-/5 L:  4/5  Abduction- R: 4-/5 L: 4/5  ER - R: 4-/5 L: 4/5  IR - R: 4/5 L: 4/5     Hip MMT  Flexion- R: 4/5 L: 4/5  Abduction- R: 4/5 L: 4/5   Adduction - R:4/5 L: 4/5  Extension - R:4/5 L: 4/5     Outcome Measures: 8/1/25  Other Measures  Disability of Arm Shoulder Hand (DASH):  "28/55  Lower Extremity Funtional Score (LEFS): 29/80    Treatments:   THERE EX  Recheck  Seated shoulder pulleys x 2 min  LAQ 2 x 10   Seated adduction 2 x 10   Amb in clinic 3 laps with RW Gait belt  Seated HR x20   Standing NBOS unsupported 30\" x 3  Standing march 2 x 20  Sit to stand 2 x 5        Assessment: Pt has completed his most recent POC. He is making slow progress towards goals. He remains a falls risk and continues to need his wheeled walker for all distances. However, he is now able to stand unsupported without his walker. He did experience a fall recently which has also delayed his progress towards his goals. Would recommend additional PT to help pt meet his goal of ambulating safely without his walker. Pt continues to progress towards goals established in the POC and should continue with skilled therapy.         Plan:   Recommend additional 6 visits      Goals:  Active       PT Problem       PT Goals (Progressing)       Start:  03/24/25       1. Pt will decrease QuickDASH to 20/55 or better in order to demonstrate an increase in UE function   2. Pt will increase his R shoulder flexion, abduction, and ER by a minimum of 10 degrees in order to demonstrate an increase in UE ROM.   3. Pt will increase all UE MMT to 4+/5 or better in order to demonstrate an increase in functional strength  4. Pt will be able to place items in high cabinet with no increase in pain or discomfort in order to demonstrate an increase in functional mobility.   5. Pt will increase LEFS to 60/80 in order to demo an increase in LE function  6. Pt will be able to walk without assistive device for 10 mins with no increase in hip pain or loss of balance               "

## 2025-08-07 ENCOUNTER — APPOINTMENT (OUTPATIENT)
Dept: PHYSICAL THERAPY | Facility: CLINIC | Age: 80
End: 2025-08-07
Payer: MEDICARE

## 2025-08-08 ENCOUNTER — HOSPITAL ENCOUNTER (OUTPATIENT)
Dept: RADIOLOGY | Facility: HOSPITAL | Age: 80
Discharge: HOME | End: 2025-08-08
Payer: MEDICARE

## 2025-08-08 DIAGNOSIS — R41.3 MEMORY LOSS: ICD-10-CM

## 2025-08-08 PROCEDURE — 0866T QUAN MRI ALYS BRN W/DX MRI: CPT

## 2025-08-12 ENCOUNTER — APPOINTMENT (OUTPATIENT)
Dept: PHYSICAL THERAPY | Facility: CLINIC | Age: 80
End: 2025-08-12
Payer: MEDICARE

## 2025-08-13 ENCOUNTER — APPOINTMENT (OUTPATIENT)
Dept: CARDIOLOGY | Facility: CLINIC | Age: 80
End: 2025-08-13
Payer: MEDICARE

## 2025-08-13 ENCOUNTER — APPOINTMENT (OUTPATIENT)
Dept: PHYSICAL THERAPY | Facility: CLINIC | Age: 80
End: 2025-08-13
Payer: MEDICARE

## 2025-08-13 VITALS
WEIGHT: 134 LBS | BODY MASS INDEX: 21.63 KG/M2 | OXYGEN SATURATION: 98 % | HEART RATE: 66 BPM | DIASTOLIC BLOOD PRESSURE: 71 MMHG | SYSTOLIC BLOOD PRESSURE: 152 MMHG

## 2025-08-13 DIAGNOSIS — R55 SYNCOPE, UNSPECIFIED SYNCOPE TYPE: Primary | ICD-10-CM

## 2025-08-13 PROCEDURE — 1160F RVW MEDS BY RX/DR IN RCRD: CPT

## 2025-08-13 PROCEDURE — 3078F DIAST BP <80 MM HG: CPT

## 2025-08-13 PROCEDURE — 93000 ELECTROCARDIOGRAM COMPLETE: CPT

## 2025-08-13 PROCEDURE — 3077F SYST BP >= 140 MM HG: CPT

## 2025-08-13 PROCEDURE — 1159F MED LIST DOCD IN RCRD: CPT

## 2025-08-13 PROCEDURE — 1036F TOBACCO NON-USER: CPT

## 2025-08-13 PROCEDURE — 99214 OFFICE O/P EST MOD 30 MIN: CPT

## 2025-08-13 PROCEDURE — G2211 COMPLEX E/M VISIT ADD ON: HCPCS

## 2025-08-19 ENCOUNTER — APPOINTMENT (OUTPATIENT)
Dept: PHYSICAL THERAPY | Facility: CLINIC | Age: 80
End: 2025-08-19
Payer: MEDICARE

## 2025-08-20 ENCOUNTER — APPOINTMENT (OUTPATIENT)
Facility: CLINIC | Age: 80
End: 2025-08-20
Payer: MEDICARE

## 2025-08-20 ASSESSMENT — PAIN SCALES - GENERAL: PAINLEVEL_OUTOF10: 0-NO PAIN

## 2025-08-27 ENCOUNTER — TELEPHONE (OUTPATIENT)
Dept: GERIATRIC MEDICINE | Facility: CLINIC | Age: 80
End: 2025-08-27
Payer: MEDICARE

## 2025-08-27 ENCOUNTER — APPOINTMENT (OUTPATIENT)
Dept: PHYSICAL THERAPY | Facility: CLINIC | Age: 80
End: 2025-08-27
Payer: MEDICARE

## 2025-09-01 PROBLEM — F01.B0: Status: ACTIVE | Noted: 2025-09-01

## 2025-09-01 PROBLEM — Z86.73 HISTORY OF CVA (CEREBROVASCULAR ACCIDENT): Status: ACTIVE | Noted: 2025-09-01

## 2025-10-08 ENCOUNTER — APPOINTMENT (OUTPATIENT)
Dept: NEUROLOGY | Facility: CLINIC | Age: 80
End: 2025-10-08
Payer: MEDICARE

## 2025-11-14 ENCOUNTER — APPOINTMENT (OUTPATIENT)
Dept: SLEEP MEDICINE | Facility: CLINIC | Age: 80
End: 2025-11-14
Payer: MEDICARE

## 2025-11-19 ENCOUNTER — APPOINTMENT (OUTPATIENT)
Facility: CLINIC | Age: 80
End: 2025-11-19
Payer: MEDICARE

## 2025-11-24 ENCOUNTER — APPOINTMENT (OUTPATIENT)
Dept: PRIMARY CARE | Facility: CLINIC | Age: 80
End: 2025-11-24
Payer: MEDICARE

## 2025-12-15 ENCOUNTER — APPOINTMENT (OUTPATIENT)
Dept: PRIMARY CARE | Facility: CLINIC | Age: 80
End: 2025-12-15
Payer: MEDICARE

## 2026-08-19 ENCOUNTER — APPOINTMENT (OUTPATIENT)
Dept: CARDIOLOGY | Facility: CLINIC | Age: 81
End: 2026-08-19
Payer: MEDICARE